# Patient Record
Sex: FEMALE | Race: WHITE | NOT HISPANIC OR LATINO | Employment: FULL TIME | URBAN - METROPOLITAN AREA
[De-identification: names, ages, dates, MRNs, and addresses within clinical notes are randomized per-mention and may not be internally consistent; named-entity substitution may affect disease eponyms.]

---

## 2019-01-06 ENCOUNTER — HOSPITAL ENCOUNTER (OUTPATIENT)
Facility: HOSPITAL | Age: 34
Setting detail: OBSERVATION
Discharge: HOME | End: 2019-01-07
Attending: EMERGENCY MEDICINE | Admitting: INTERNAL MEDICINE

## 2019-01-06 ENCOUNTER — APPOINTMENT (OUTPATIENT)
Dept: GENERAL RADIOLOGY | Facility: HOSPITAL | Age: 34
End: 2019-01-06

## 2019-01-06 DIAGNOSIS — Z78.9 FAILURE OF OUTPATIENT TREATMENT: ICD-10-CM

## 2019-01-06 DIAGNOSIS — R50.9 ACUTE FEBRILE ILLNESS: ICD-10-CM

## 2019-01-06 DIAGNOSIS — J18.9 PNEUMONIA OF RIGHT LOWER LOBE DUE TO INFECTIOUS ORGANISM: Primary | ICD-10-CM

## 2019-01-06 PROBLEM — A41.9 SEPSIS: Status: ACTIVE | Noted: 2019-01-06

## 2019-01-06 LAB
ALBUMIN SERPL-MCNC: 4.09 G/DL (ref 3.2–4.8)
ALBUMIN/GLOB SERPL: 1.7 G/DL (ref 1.5–2.5)
ALP SERPL-CCNC: 42 U/L (ref 25–100)
ALT SERPL W P-5'-P-CCNC: 27 U/L (ref 7–40)
ANION GAP SERPL CALCULATED.3IONS-SCNC: 8 MMOL/L (ref 3–11)
AST SERPL-CCNC: 26 U/L (ref 0–33)
B-HCG UR QL: NEGATIVE
BASOPHILS # BLD AUTO: 0.01 10*3/MM3 (ref 0–0.2)
BASOPHILS NFR BLD AUTO: 0.3 % (ref 0–1)
BILIRUB SERPL-MCNC: 0.4 MG/DL (ref 0.3–1.2)
BUN BLD-MCNC: 6 MG/DL (ref 9–23)
BUN/CREAT SERPL: 8.5 (ref 7–25)
CALCIUM SPEC-SCNC: 8.8 MG/DL (ref 8.7–10.4)
CHLORIDE SERPL-SCNC: 105 MMOL/L (ref 99–109)
CO2 SERPL-SCNC: 25 MMOL/L (ref 20–31)
CREAT BLD-MCNC: 0.71 MG/DL (ref 0.6–1.3)
D-LACTATE SERPL-SCNC: 2 MMOL/L (ref 0.5–2)
DEPRECATED RDW RBC AUTO: 40.5 FL (ref 37–54)
EOSINOPHIL # BLD AUTO: 0.01 10*3/MM3 (ref 0–0.3)
EOSINOPHIL NFR BLD AUTO: 0.3 % (ref 0–3)
ERYTHROCYTE [DISTWIDTH] IN BLOOD BY AUTOMATED COUNT: 12 % (ref 11.3–14.5)
GFR SERPL CREATININE-BSD FRML MDRD: 95 ML/MIN/1.73
GLOBULIN UR ELPH-MCNC: 2.4 GM/DL
GLUCOSE BLD-MCNC: 129 MG/DL (ref 70–100)
HCT VFR BLD AUTO: 40.3 % (ref 34.5–44)
HGB BLD-MCNC: 13.8 G/DL (ref 11.5–15.5)
IMM GRANULOCYTES # BLD AUTO: 0.01 10*3/MM3 (ref 0–0.03)
IMM GRANULOCYTES NFR BLD AUTO: 0.3 % (ref 0–0.6)
INTERNAL NEGATIVE CONTROL: NEGATIVE
INTERNAL POSITIVE CONTROL: POSITIVE
LYMPHOCYTES # BLD AUTO: 0.67 10*3/MM3 (ref 0.6–4.8)
LYMPHOCYTES NFR BLD AUTO: 18 % (ref 24–44)
Lab: NORMAL
MCH RBC QN AUTO: 31.3 PG (ref 27–31)
MCHC RBC AUTO-ENTMCNC: 34.2 G/DL (ref 32–36)
MCV RBC AUTO: 91.4 FL (ref 80–99)
MONOCYTES # BLD AUTO: 0.28 10*3/MM3 (ref 0–1)
MONOCYTES NFR BLD AUTO: 7.5 % (ref 0–12)
NEUTROPHILS # BLD AUTO: 2.76 10*3/MM3 (ref 1.5–8.3)
NEUTROPHILS NFR BLD AUTO: 73.9 % (ref 41–71)
PLATELET # BLD AUTO: 190 10*3/MM3 (ref 150–450)
PMV BLD AUTO: 9.9 FL (ref 6–12)
POTASSIUM BLD-SCNC: 3.9 MMOL/L (ref 3.5–5.5)
PROT SERPL-MCNC: 6.5 G/DL (ref 5.7–8.2)
RBC # BLD AUTO: 4.41 10*6/MM3 (ref 3.89–5.14)
SODIUM BLD-SCNC: 138 MMOL/L (ref 132–146)
WBC NRBC COR # BLD: 3.73 10*3/MM3 (ref 3.5–10.8)

## 2019-01-06 PROCEDURE — 81025 URINE PREGNANCY TEST: CPT | Performed by: EMERGENCY MEDICINE

## 2019-01-06 PROCEDURE — 99284 EMERGENCY DEPT VISIT MOD MDM: CPT

## 2019-01-06 PROCEDURE — 87486 CHLMYD PNEUM DNA AMP PROBE: CPT | Performed by: NURSE PRACTITIONER

## 2019-01-06 PROCEDURE — 87798 DETECT AGENT NOS DNA AMP: CPT | Performed by: NURSE PRACTITIONER

## 2019-01-06 PROCEDURE — 96367 TX/PROPH/DG ADDL SEQ IV INF: CPT

## 2019-01-06 PROCEDURE — 80053 COMPREHEN METABOLIC PANEL: CPT | Performed by: EMERGENCY MEDICINE

## 2019-01-06 PROCEDURE — 25010000002 CEFTRIAXONE PER 250 MG: Performed by: EMERGENCY MEDICINE

## 2019-01-06 PROCEDURE — 85025 COMPLETE CBC W/AUTO DIFF WBC: CPT | Performed by: EMERGENCY MEDICINE

## 2019-01-06 PROCEDURE — G0378 HOSPITAL OBSERVATION PER HR: HCPCS

## 2019-01-06 PROCEDURE — 99219 PR INITIAL OBSERVATION CARE/DAY 50 MINUTES: CPT | Performed by: INTERNAL MEDICINE

## 2019-01-06 PROCEDURE — 96365 THER/PROPH/DIAG IV INF INIT: CPT

## 2019-01-06 PROCEDURE — 87633 RESP VIRUS 12-25 TARGETS: CPT | Performed by: NURSE PRACTITIONER

## 2019-01-06 PROCEDURE — 25010000002 AZITHROMYCIN: Performed by: EMERGENCY MEDICINE

## 2019-01-06 PROCEDURE — 83605 ASSAY OF LACTIC ACID: CPT | Performed by: EMERGENCY MEDICINE

## 2019-01-06 PROCEDURE — 71045 X-RAY EXAM CHEST 1 VIEW: CPT

## 2019-01-06 PROCEDURE — 36415 COLL VENOUS BLD VENIPUNCTURE: CPT | Performed by: EMERGENCY MEDICINE

## 2019-01-06 PROCEDURE — 96361 HYDRATE IV INFUSION ADD-ON: CPT

## 2019-01-06 PROCEDURE — 87040 BLOOD CULTURE FOR BACTERIA: CPT | Performed by: EMERGENCY MEDICINE

## 2019-01-06 PROCEDURE — 87581 M.PNEUMON DNA AMP PROBE: CPT | Performed by: NURSE PRACTITIONER

## 2019-01-06 RX ORDER — SODIUM CHLORIDE 9 MG/ML
100 INJECTION, SOLUTION INTRAVENOUS CONTINUOUS
Status: DISCONTINUED | OUTPATIENT
Start: 2019-01-06 | End: 2019-01-07 | Stop reason: HOSPADM

## 2019-01-06 RX ORDER — SODIUM CHLORIDE 0.9 % (FLUSH) 0.9 %
10 SYRINGE (ML) INJECTION AS NEEDED
Status: DISCONTINUED | OUTPATIENT
Start: 2019-01-06 | End: 2019-01-07 | Stop reason: HOSPADM

## 2019-01-06 RX ORDER — SODIUM CHLORIDE 0.9 % (FLUSH) 0.9 %
3 SYRINGE (ML) INJECTION EVERY 12 HOURS SCHEDULED
Status: DISCONTINUED | OUTPATIENT
Start: 2019-01-06 | End: 2019-01-07 | Stop reason: HOSPADM

## 2019-01-06 RX ORDER — CEFTRIAXONE SODIUM 1 G/50ML
1 INJECTION, SOLUTION INTRAVENOUS EVERY 24 HOURS
Status: DISCONTINUED | OUTPATIENT
Start: 2019-01-07 | End: 2019-01-07 | Stop reason: HOSPADM

## 2019-01-06 RX ORDER — AMOXICILLIN AND CLAVULANATE POTASSIUM 875; 125 MG/1; MG/1
1 TABLET, FILM COATED ORAL 2 TIMES DAILY
COMMUNITY
End: 2019-01-24

## 2019-01-06 RX ORDER — ACETAMINOPHEN 325 MG/1
650 TABLET ORAL EVERY 4 HOURS PRN
Status: DISCONTINUED | OUTPATIENT
Start: 2019-01-06 | End: 2019-01-07 | Stop reason: HOSPADM

## 2019-01-06 RX ORDER — IPRATROPIUM BROMIDE AND ALBUTEROL SULFATE 2.5; .5 MG/3ML; MG/3ML
3 SOLUTION RESPIRATORY (INHALATION) EVERY 4 HOURS PRN
Status: DISCONTINUED | OUTPATIENT
Start: 2019-01-06 | End: 2019-01-07 | Stop reason: HOSPADM

## 2019-01-06 RX ORDER — CETIRIZINE HYDROCHLORIDE 10 MG/1
10 TABLET ORAL DAILY
Status: DISCONTINUED | OUTPATIENT
Start: 2019-01-07 | End: 2019-01-07 | Stop reason: HOSPADM

## 2019-01-06 RX ORDER — CETIRIZINE HYDROCHLORIDE 10 MG/1
10 TABLET ORAL DAILY
COMMUNITY
End: 2021-08-19

## 2019-01-06 RX ORDER — CEFTRIAXONE SODIUM 1 G/50ML
1 INJECTION, SOLUTION INTRAVENOUS ONCE
Status: COMPLETED | OUTPATIENT
Start: 2019-01-06 | End: 2019-01-06

## 2019-01-06 RX ORDER — SODIUM CHLORIDE 0.9 % (FLUSH) 0.9 %
3-10 SYRINGE (ML) INJECTION AS NEEDED
Status: DISCONTINUED | OUTPATIENT
Start: 2019-01-06 | End: 2019-01-07 | Stop reason: HOSPADM

## 2019-01-06 RX ORDER — ACETAMINOPHEN 500 MG
1000 TABLET ORAL ONCE
Status: COMPLETED | OUTPATIENT
Start: 2019-01-06 | End: 2019-01-06

## 2019-01-06 RX ADMIN — SODIUM CHLORIDE 1000 ML: 9 INJECTION, SOLUTION INTRAVENOUS at 19:07

## 2019-01-06 RX ADMIN — AZITHROMYCIN MONOHYDRATE 500 MG: 500 INJECTION, POWDER, LYOPHILIZED, FOR SOLUTION INTRAVENOUS at 21:03

## 2019-01-06 RX ADMIN — CEFTRIAXONE SODIUM 1 G: 1 INJECTION, SOLUTION INTRAVENOUS at 20:26

## 2019-01-06 RX ADMIN — SODIUM CHLORIDE, PRESERVATIVE FREE 3 ML: 5 INJECTION INTRAVENOUS at 22:14

## 2019-01-06 RX ADMIN — ACETAMINOPHEN 1000 MG: 500 TABLET, FILM COATED ORAL at 20:26

## 2019-01-06 RX ADMIN — SODIUM CHLORIDE 100 ML/HR: 9 INJECTION, SOLUTION INTRAVENOUS at 22:14

## 2019-01-06 NOTE — ED PROVIDER NOTES
"Subjective   Natasha Beckett is a 33 y.o.female who presents to the ED with c/o fever with onset a couple of days ago. She reports that she suddenly developed severe fever recording up to 103.1 with cough, chills, diaphoresis, and generalized weakness. She was recently diagnosed with pneumonia by [facility], 2 days ago. She was started on Augmentin 875 mg. She has taken 5 doses of Augmentin prior to her arrival today. At this time, a flu swab was taken which was negative. She has attempted tylenol and motrin with minimal relief. Her last motrin dose was at 1600 today and last tylenol at 1000 today. She also complains of a \"scratchy\" throat but denies any sore throat, dysuria, BM changes, rash, or any other complaints at this time. She has had the flu and hepatitis A vaccine. Her notable temperature readings include 103.1 last night and 102.9 around 1700 today.        History provided by:  Patient  Fever   Max temp prior to arrival:  103.1  Temp source:  Oral  Severity:  Moderate  Onset quality:  Sudden  Timing:  Constant  Progression:  Worsening  Chronicity:  New  Relieved by:  Nothing  Worsened by:  Nothing  Ineffective treatments:  Acetaminophen (motrin)  Associated symptoms: chills and cough    Associated symptoms: no diarrhea, no dysuria, no rash and no sore throat    Risk factors: sick contacts        Review of Systems   Constitutional: Positive for chills, diaphoresis, fatigue and fever.   HENT: Negative for sore throat.    Respiratory: Positive for cough.    Gastrointestinal: Negative for blood in stool, constipation and diarrhea.   Genitourinary: Negative for dysuria.   Skin: Negative for rash.   Neurological: Positive for weakness.   All other systems reviewed and are negative.      History reviewed. No pertinent past medical history.    No Known Allergies    History reviewed. No pertinent surgical history.    History reviewed. No pertinent family history.    Social History     Socioeconomic " History   • Marital status:      Spouse name: Not on file   • Number of children: Not on file   • Years of education: Not on file   • Highest education level: Not on file   Tobacco Use   • Smoking status: Never Smoker   Substance and Sexual Activity   • Alcohol use: No     Frequency: Never   • Drug use: No   • Sexual activity: Yes     Partners: Male         Objective   Physical Exam   Constitutional: She is oriented to person, place, and time. She appears well-developed and well-nourished. No distress.   Appears generally weak. She states that she is feeling poorly.   HENT:   Head: Normocephalic and atraumatic.   Nose: Nose normal.   Mouth/Throat: Mucous membranes are dry.   Eyes: Conjunctivae are normal. No scleral icterus.   Neck: Normal range of motion. Neck supple.   Cardiovascular: Regular rhythm and normal heart sounds. Tachycardia present.   No murmur heard.  Pulmonary/Chest: Effort normal. No respiratory distress. She has decreased breath sounds (Across right base).   Abdominal: Soft. Bowel sounds are normal. There is no tenderness.   Neurological: She is alert and oriented to person, place, and time.   Skin: Skin is warm and dry.   Skin is hot. No rash.    Psychiatric: She has a normal mood and affect. Her behavior is normal.   Nursing note and vitals reviewed.      Procedures         ED Course  ED Course as of Jan 06 2124   Sun Jan 06, 2019 2103 I have paged the hospitalist for admission.  [MS]   2113 Case discussed with Dr. Mg, hospitalist, who will admit to [facility].  [MS]      ED Course User Index  [MS] Clifford Andrew MD     Recent Results (from the past 24 hour(s))   Lactic Acid, Plasma    Collection Time: 01/06/19  6:41 PM   Result Value Ref Range    Lactate 2.0 0.5 - 2.0 mmol/L   Comprehensive Metabolic Panel    Collection Time: 01/06/19  6:41 PM   Result Value Ref Range    Glucose 129 (H) 70 - 100 mg/dL    BUN 6 (L) 9 - 23 mg/dL    Creatinine 0.71 0.60 - 1.30 mg/dL    Sodium 138 132 -  146 mmol/L    Potassium 3.9 3.5 - 5.5 mmol/L    Chloride 105 99 - 109 mmol/L    CO2 25.0 20.0 - 31.0 mmol/L    Calcium 8.8 8.7 - 10.4 mg/dL    Total Protein 6.5 5.7 - 8.2 g/dL    Albumin 4.09 3.20 - 4.80 g/dL    ALT (SGPT) 27 7 - 40 U/L    AST (SGOT) 26 0 - 33 U/L    Alkaline Phosphatase 42 25 - 100 U/L    Total Bilirubin 0.4 0.3 - 1.2 mg/dL    eGFR Non African Amer 95 >60 mL/min/1.73    Globulin 2.4 gm/dL    A/G Ratio 1.7 1.5 - 2.5 g/dL    BUN/Creatinine Ratio 8.5 7.0 - 25.0    Anion Gap 8.0 3.0 - 11.0 mmol/L   CBC Auto Differential    Collection Time: 01/06/19  6:41 PM   Result Value Ref Range    WBC 3.73 3.50 - 10.80 10*3/mm3    RBC 4.41 3.89 - 5.14 10*6/mm3    Hemoglobin 13.8 11.5 - 15.5 g/dL    Hematocrit 40.3 34.5 - 44.0 %    MCV 91.4 80.0 - 99.0 fL    MCH 31.3 (H) 27.0 - 31.0 pg    MCHC 34.2 32.0 - 36.0 g/dL    RDW 12.0 11.3 - 14.5 %    RDW-SD 40.5 37.0 - 54.0 fl    MPV 9.9 6.0 - 12.0 fL    Platelets 190 150 - 450 10*3/mm3    Neutrophil % 73.9 (H) 41.0 - 71.0 %    Lymphocyte % 18.0 (L) 24.0 - 44.0 %    Monocyte % 7.5 0.0 - 12.0 %    Eosinophil % 0.3 0.0 - 3.0 %    Basophil % 0.3 0.0 - 1.0 %    Immature Grans % 0.3 0.0 - 0.6 %    Neutrophils, Absolute 2.76 1.50 - 8.30 10*3/mm3    Lymphocytes, Absolute 0.67 0.60 - 4.80 10*3/mm3    Monocytes, Absolute 0.28 0.00 - 1.00 10*3/mm3    Eosinophils, Absolute 0.01 0.00 - 0.30 10*3/mm3    Basophils, Absolute 0.01 0.00 - 0.20 10*3/mm3    Immature Grans, Absolute 0.01 0.00 - 0.03 10*3/mm3     Note: In addition to lab results from this visit, the labs listed above may include labs taken at another facility or during a different encounter within the last 24 hours. Please correlate lab times with ED admission and discharge times for further clarification of the services performed during this visit.    XR Chest 1 View   Final Result   The overall findings are concerning for pneumonia. A repeat chest radiograph    following treatment may be helpful to document resolution.     "  Additional information as discussed under \"Findings.\"      THIS DOCUMENT HAS BEEN ELECTRONICALLY SIGNED BY CHRISTOPHER BAIRD MD        Vitals:    01/06/19 1827   BP: 126/84   BP Location: Left arm   Patient Position: Sitting   Pulse: (!) 136   Resp: 16   Temp: (!) 102.1 °F (38.9 °C)   TempSrc: Oral   SpO2: 97%   Weight: 65.8 kg (145 lb)   Height: 170.2 cm (67\")     Medications   sodium chloride 0.9 % flush 10 mL (not administered)   azithromycin 500 MG/250 ML 0.9% NS IVPB (MBP) (500 mg Intravenous New Bag 1/6/19 2103)   acetaminophen (TYLENOL) tablet 1,000 mg (1,000 mg Oral Given 1/6/19 2026)   sodium chloride 0.9 % bolus 1,000 mL (0 mL Intravenous Stopped 1/6/19 2026)   cefTRIAXone (ROCEPHIN) IVPB 1 g (0 g Intravenous Stopped 1/6/19 2102)     ECG/EMG Results (last 24 hours)     ** No results found for the last 24 hours. **                        [facility]    Final diagnoses:   Pneumonia of right lower lobe due to infectious organism (CMS/[facility])   Failure of outpatient treatment   Acute febrile illness       Documentation assistance provided by veronique Calles.  Information recorded by the veronique was done at my direction and has been verified and validated by me.     Boris Calles  01/06/19 1940       Clifford Andrew MD  01/06/19 2124    "

## 2019-01-07 VITALS
RESPIRATION RATE: 18 BRPM | SYSTOLIC BLOOD PRESSURE: 101 MMHG | BODY MASS INDEX: 23.29 KG/M2 | WEIGHT: 148.4 LBS | HEIGHT: 67 IN | HEART RATE: 101 BPM | TEMPERATURE: 98.2 F | OXYGEN SATURATION: 97 % | DIASTOLIC BLOOD PRESSURE: 58 MMHG

## 2019-01-07 PROBLEM — J18.9 PNEUMONIA OF RIGHT LOWER LOBE DUE TO INFECTIOUS ORGANISM: Status: ACTIVE | Noted: 2019-01-07

## 2019-01-07 LAB
ANION GAP SERPL CALCULATED.3IONS-SCNC: 3 MMOL/L (ref 3–11)
BASOPHILS # BLD AUTO: 0.01 10*3/MM3 (ref 0–0.2)
BASOPHILS NFR BLD AUTO: 0.2 % (ref 0–1)
BUN BLD-MCNC: 5 MG/DL (ref 9–23)
BUN/CREAT SERPL: 9.6 (ref 7–25)
CALCIUM SPEC-SCNC: 7.9 MG/DL (ref 8.7–10.4)
CHLORIDE SERPL-SCNC: 109 MMOL/L (ref 99–109)
CO2 SERPL-SCNC: 26 MMOL/L (ref 20–31)
CREAT BLD-MCNC: 0.52 MG/DL (ref 0.6–1.3)
DEPRECATED RDW RBC AUTO: 40.7 FL (ref 37–54)
EOSINOPHIL # BLD AUTO: 0 10*3/MM3 (ref 0–0.3)
EOSINOPHIL NFR BLD AUTO: 0 % (ref 0–3)
ERYTHROCYTE [DISTWIDTH] IN BLOOD BY AUTOMATED COUNT: 12.1 % (ref 11.3–14.5)
FLUAV SUBTYP SPEC NAA+PROBE: NOT DETECTED
FLUBV RNA ISLT QL NAA+PROBE: NOT DETECTED
GFR SERPL CREATININE-BSD FRML MDRD: 136 ML/MIN/1.73
GLUCOSE BLD-MCNC: 93 MG/DL (ref 70–100)
HCT VFR BLD AUTO: 34.8 % (ref 34.5–44)
HGB BLD-MCNC: 11.8 G/DL (ref 11.5–15.5)
IMM GRANULOCYTES # BLD AUTO: 0.01 10*3/MM3 (ref 0–0.03)
IMM GRANULOCYTES NFR BLD AUTO: 0.2 % (ref 0–0.6)
LYMPHOCYTES # BLD AUTO: 0.78 10*3/MM3 (ref 0.6–4.8)
LYMPHOCYTES NFR BLD AUTO: 19.4 % (ref 24–44)
MCH RBC QN AUTO: 31 PG (ref 27–31)
MCHC RBC AUTO-ENTMCNC: 33.9 G/DL (ref 32–36)
MCV RBC AUTO: 91.3 FL (ref 80–99)
MONOCYTES # BLD AUTO: 0.36 10*3/MM3 (ref 0–1)
MONOCYTES NFR BLD AUTO: 8.9 % (ref 0–12)
NEUTROPHILS # BLD AUTO: 2.88 10*3/MM3 (ref 1.5–8.3)
NEUTROPHILS NFR BLD AUTO: 71.5 % (ref 41–71)
PLATELET # BLD AUTO: 179 10*3/MM3 (ref 150–450)
PMV BLD AUTO: 10 FL (ref 6–12)
POTASSIUM BLD-SCNC: 3.9 MMOL/L (ref 3.5–5.5)
RBC # BLD AUTO: 3.81 10*6/MM3 (ref 3.89–5.14)
SODIUM BLD-SCNC: 138 MMOL/L (ref 132–146)
WBC NRBC COR # BLD: 4.03 10*3/MM3 (ref 3.5–10.8)

## 2019-01-07 PROCEDURE — G0378 HOSPITAL OBSERVATION PER HR: HCPCS

## 2019-01-07 PROCEDURE — 87502 INFLUENZA DNA AMP PROBE: CPT | Performed by: INTERNAL MEDICINE

## 2019-01-07 PROCEDURE — 80048 BASIC METABOLIC PNL TOTAL CA: CPT | Performed by: NURSE PRACTITIONER

## 2019-01-07 PROCEDURE — 87449 NOS EACH ORGANISM AG IA: CPT | Performed by: NURSE PRACTITIONER

## 2019-01-07 PROCEDURE — 94640 AIRWAY INHALATION TREATMENT: CPT

## 2019-01-07 PROCEDURE — 87899 AGENT NOS ASSAY W/OPTIC: CPT | Performed by: NURSE PRACTITIONER

## 2019-01-07 PROCEDURE — 85025 COMPLETE CBC W/AUTO DIFF WBC: CPT | Performed by: NURSE PRACTITIONER

## 2019-01-07 PROCEDURE — 99217 PR OBSERVATION CARE DISCHARGE MANAGEMENT: CPT | Performed by: INTERNAL MEDICINE

## 2019-01-07 RX ORDER — DOXYCYCLINE 100 MG/1
100 CAPSULE ORAL 2 TIMES DAILY
Qty: 8 CAPSULE | Refills: 0 | Status: SHIPPED | OUTPATIENT
Start: 2019-01-07 | End: 2019-01-11

## 2019-01-07 RX ORDER — OSELTAMIVIR PHOSPHATE 75 MG/1
75 CAPSULE ORAL EVERY 12 HOURS SCHEDULED
Status: DISCONTINUED | OUTPATIENT
Start: 2019-01-07 | End: 2019-01-07

## 2019-01-07 RX ADMIN — ACETAMINOPHEN 650 MG: 325 TABLET ORAL at 09:57

## 2019-01-07 RX ADMIN — IPRATROPIUM BROMIDE AND ALBUTEROL SULFATE 3 ML: 2.5; .5 SOLUTION RESPIRATORY (INHALATION) at 02:20

## 2019-01-07 RX ADMIN — Medication 5 MG: at 03:44

## 2019-01-07 RX ADMIN — SODIUM CHLORIDE, PRESERVATIVE FREE 3 ML: 5 INJECTION INTRAVENOUS at 08:47

## 2019-01-07 RX ADMIN — ACETAMINOPHEN 650 MG: 325 TABLET ORAL at 03:41

## 2019-01-07 NOTE — DISCHARGE SUMMARY
[facility] Medicine Services  DISCHARGE SUMMARY    Patient Name: Natasha Beckett  : 1985  MRN: [id number]    Date of Admission: 2019  Date of Discharge:  2019  Primary Care Physician: Provider, No Known    Consults     No orders found for last 30 day(s).          Hospital Course     Presenting Problem:   Pneumonia of right lower lobe due to infectious organism (CMS/HCC) [J18.1]    Active Hospital Problems    Diagnosis Date Noted   • **Sepsis (CMS/HCC) [A41.9] 2019   • Pneumonia of right lower lobe due to infectious organism (CMS/HCC) [J18.1] 2019   • Pneumonia [J18.9] 2019      Resolved Hospital Problems   No resolved problems to display.          Hospital Course:  Natasha Beckett is a 33 y.o. female with no medical problems who presented to [facility] with concerns of sepsis. Patient was noted to be febrile with leukocytosis on admission, workup demonstrated a PNA in RLL. Patient was initially placed on IV abx with rocephin/azithro to cover for CAP. Patient improved quickly and was feeling feel at time of discharge. A flu swab was sent and pending at time of discharge, this was not sent initially in the ED. Patient was discharged with a 5 day course of doxycycline, instructed to come back to ED if worsening fevers, chills, cough, soa.       Discharge Follow Up Recommendations for labs/diagnostics:  PCP in 1-2 weeks    Day of Discharge     HPI:   Doing better. Feels like breathing is improved. Would like to go home.     Review of Systems      Otherwise ROS is negative except as mentioned in the HPI.    Vital Signs:   Temp:  [98.2 °F (36.8 °C)-102.1 °F (38.9 °C)] 98.2 °F (36.8 °C)  Heart Rate:  [] 101  Resp:  [16-18] 18  BP: (101-126)/(58-84) 101/58     Physical Exam:  GEN- no acute distress noted, resting in bed, awake  HEENT- atraumatic, normocephlic, eomi  NECK- supple, trachea midline, no masses  RESP: good effort, slight wheezing   CV: no murmurs,  s1/s2, rrr  MSK: no edema noted, spontaneous movement of all extremities  NEURO: alert, oriented, no focal deficits  SKIN: no rashes  PSYCH: appropriate mood and affect       Pertinent  and/or Most Recent Results     Results from last 7 days   Lab Units  01/07/19   0549  01/06/19   1841   WBC 10*3/mm3  4.03  3.73   HEMOGLOBIN g/dL  11.8  13.8   HEMATOCRIT %  34.8  40.3   PLATELETS 10*3/mm3  179  190   SODIUM mmol/L  138  138   POTASSIUM mmol/L  3.9  3.9   CHLORIDE mmol/L  109  105   CO2 mmol/L  26.0  25.0   BUN mg/dL  5*  6*   CREATININE mg/dL  0.52*  0.71   GLUCOSE mg/dL  93  129*   CALCIUM mg/dL  7.9*  8.8     Results from last 7 days   Lab Units  01/06/19   1841   BILIRUBIN mg/dL  0.4   ALK PHOS U/L  42   ALT (SGPT) U/L  27   AST (SGOT) U/L  26           Invalid input(s): TG, LDLCALC, LDLREALC        Brief Urine Lab Results  (Last result in the past 365 days)      Color   Clarity   Blood   Leuk Est   Nitrite   Protein   CREAT   Urine HCG        01/06/19 2152               Negative           Microbiology Results Abnormal     Procedure Component Value - Date/Time    Blood Culture - Blood, Wrist, Right [[id number]] Collected:  01/06/19 1840    Lab Status:  Preliminary result Specimen:  Blood from Wrist, Right Updated:  01/07/19 0745     Blood Culture No growth at less than 24 hours    Blood Culture - Blood, Arm, Left [[id number]] Collected:  01/06/19 1850    Lab Status:  Preliminary result Specimen:  Blood from Arm, Left Updated:  01/07/19 0745     Blood Culture No growth at less than 24 hours          Imaging Results (all)     Procedure Component Value Units Date/Time    XR Chest 1 View [[id number]] Collected:  01/06/19 1847     Updated:  01/06/19 2003    Narrative:       EXAM:    XR Chest, 1 View     EXAM DATE/TIME:    1/6/2019 6:47 PM     CLINICAL HISTORY:    33 years old, female; Signs and symptoms; Fever; Additional info: Simple   sepsis triage protocol     TECHNIQUE:    XR of the chest, frontal view.     COMPARISON:  "   No relevant prior studies available.     FINDINGS:    Lungs:  Localized air space disease in the right lower lung zone concerning   for pneumonia.    Pleural space: No evidence of a pneumothorax. No definite pleural effusion.    Heart/Mediastinum: No cardiomegaly. No significant mediastinal widening.    Bones/joints: No evidence of an acute fracture.       Impression:       The overall findings are concerning for pneumonia. A repeat chest radiograph   following treatment may be helpful to document resolution.    Additional information as discussed under \"Findings.\"    THIS DOCUMENT HAS BEEN ELECTRONICALLY SIGNED BY CHRISTOPHER BAIRD MD                          Order Current Status    Influenza A & B, RT PCR - Swab, Nasopharynx In process    Legionella Antigen, Urine - Urine, Urine, Clean Catch In process    Respiratory Virus Antigens Panel - Swab, Nasopharynx In process    S. Pneumo Ag Urine or CSF - Urine, Urine, Clean Catch In process    Blood Culture - Blood, Arm, Left Preliminary result    Blood Culture - Blood, Wrist, Right Preliminary result        Discharge Details        Discharge Medications      New Medications      Instructions Start Date   doxycycline 100 MG capsule  Commonly known as:  MONODOX   100 mg, Oral, 2 Times Daily         Continue These Medications      Instructions Start Date   amoxicillin-clavulanate 875-125 MG per tablet  Commonly known as:  AUGMENTIN   1 tablet, Oral, 2 Times Daily      cetirizine 10 MG tablet  Commonly known as:  zyrTEC   10 mg, Oral, Daily      LOMEDIA 24 FE PO   Oral, Daily             No Known Allergies      Discharge Disposition:  Home or Self Care    Discharge Diet:  Diet Order   Procedures   • Diet Regular         Discharge Activity:       Special Instructions:  If worsening with fever, chills, soa please return to ED for evaluation    CODE STATUS:    Code Status and Medical Interventions:   Ordered at: 01/06/19 0810     Level Of Support Discussed With:    " Patient     Code Status:    CPR     Medical Interventions (Level of Support Prior to Arrest):    Full         No future appointments.        Time Spent on Discharge:  35 minutes    Electronically signed by Arianna Hamilton MD, 01/07/19, 11:33 AM.

## 2019-01-07 NOTE — H&P
"    [facility] Medicine Services  HISTORY AND PHYSICAL    Patient Name: Natasha Beckett  : 1985  MRN: [id number]  Primary Care Physician: Provider, No Known  Date of admission: 2019      Subjective   Subjective     Chief Complaint:  fever    HPI:  Natasha Beckett is a 33 y.o. female who presents to the ED with complaints of fever that started on 19. She reports that she suddenly developed severe fever recording up to 103.1 with a nonproductive cough, chills, diaphoresis, and generalized weakness. She was diagnosed two days ago with pneumonia by [facility] and was started on Augmentin 875 mg. She has taken 5 doses of Augmentin prior to her arrival today. A flu swab was taken at the clinic and was negative. She has attempted tylenol and motrin with minimal relief. She also complains of a \"scratchy\" throat but denies any sore throat, dysuria, BM changes, rash, or any other complaints at this time. She has had the flu and hepatitis A vaccine.  Chest xray findings are concerning for pneumonia.  Patient was started on Rocephin and Azithromycin in the ED, and is being admitted to the Hospitalist for further evaluation and management.      Review of Systems   Constitutional: Positive for activity change, appetite change, chills, diaphoresis, fatigue and fever. Negative for unexpected weight change.   HENT: Negative.    Eyes: Negative.    Respiratory: Positive for cough (nonproductive) and shortness of breath. Negative for wheezing.    Cardiovascular: Negative.    Gastrointestinal: Negative.    Endocrine: Negative.    Genitourinary: Negative.    Musculoskeletal: Negative.    Skin: Negative.    Allergic/Immunologic: Negative.    Neurological: Negative.    Hematological: Negative.    Psychiatric/Behavioral: Negative.         Otherwise 10-system ROS reviewed and is negative except as mentioned in the HPI.    Personal History     History reviewed. No pertinent past medical " history.    History reviewed. No pertinent surgical history.    Family History: family history is not on file. Otherwise pertinent FHx was reviewed and unremarkable.     Social History:  reports that  has never smoked. She does not have any smokeless tobacco history on file. She reports that she does not drink alcohol or use drugs.  Social History     Social History Narrative   • Not on file       Medications:    Available home medication information reviewed.    (Not in a hospital admission)    No Known Allergies    Objective   Objective     Vital Signs:   Temp:  [102.1 °F (38.9 °C)] 102.1 °F (38.9 °C)  Heart Rate:  [114-136] 114  Resp:  [16] 16  BP: (112-126)/(76-84) 112/76        Physical Exam   Constitutional: She is oriented to person, place, and time. She appears well-developed. No distress.   HENT:   Head: Normocephalic.   Eyes: Pupils are equal, round, and reactive to light.   Neck: Normal range of motion. Neck supple.   Cardiovascular: Regular rhythm, normal heart sounds and intact distal pulses. Exam reveals no gallop and no friction rub.   No murmur heard.  tachycardia   Pulmonary/Chest: Effort normal and breath sounds normal. No stridor. No respiratory distress. She has no wheezes. She has no rales. She exhibits no tenderness.   Abdominal: Soft. Bowel sounds are normal. She exhibits no distension and no mass. There is no tenderness. There is no rebound and no guarding.   Musculoskeletal: Normal range of motion. She exhibits no edema, tenderness or deformity.   Neurological: She is alert and oriented to person, place, and time. No cranial nerve deficit.   Skin: Skin is warm and dry. No rash noted. She is not diaphoretic. No erythema. No pallor.   Psychiatric: She has a normal mood and affect. Her behavior is normal. Judgment and thought content normal.        Results Reviewed:  I have personally reviewed current lab, radiology, and data and agree.    Results from last 7 days   Lab Units  01/06/19   0366  "  WBC 10*3/mm3  3.73   HEMOGLOBIN g/dL  13.8   HEMATOCRIT %  40.3   PLATELETS 10*3/mm3  190     Results from last 7 days   Lab Units  01/06/19   1841   SODIUM mmol/L  138   POTASSIUM mmol/L  3.9   CHLORIDE mmol/L  105   CO2 mmol/L  25.0   BUN mg/dL  6*   CREATININE mg/dL  0.71   GLUCOSE mg/dL  129*   CALCIUM mg/dL  8.8   ALT (SGPT) U/L  27   AST (SGOT) U/L  26     Estimated Creatinine Clearance: 117.1 mL/min (by C-G formula based on SCr of 0.71 mg/dL).  Brief Urine Lab Results  (Last result in the past 365 days)      Color   Clarity   Blood   Leuk Est   Nitrite   Protein   CREAT   Urine HCG        01/06/19 2152               Negative         No results found for: BNP  Imaging Results (last 24 hours)     Procedure Component Value Units Date/Time    XR Chest 1 View [[id number]] Collected:  01/06/19 1847     Updated:  01/06/19 2003    Narrative:       EXAM:    XR Chest, 1 View     EXAM DATE/TIME:    1/6/2019 6:47 PM     CLINICAL HISTORY:    33 years old, female; Signs and symptoms; Fever; Additional info: Simple   sepsis triage protocol     TECHNIQUE:    XR of the chest, frontal view.     COMPARISON:    No relevant prior studies available.     FINDINGS:    Lungs:  Localized air space disease in the right lower lung zone concerning   for pneumonia.    Pleural space: No evidence of a pneumothorax. No definite pleural effusion.    Heart/Mediastinum: No cardiomegaly. No significant mediastinal widening.    Bones/joints: No evidence of an acute fracture.       Impression:       The overall findings are concerning for pneumonia. A repeat chest radiograph   following treatment may be helpful to document resolution.    Additional information as discussed under \"Findings.\"    THIS DOCUMENT HAS BEEN ELECTRONICALLY SIGNED BY CHRISTOPHER BAIRD MD             Assessment/Plan   Assessment / Plan     Active Hospital Problems    Diagnosis Date Noted   • **Sepsis (CMS/[facility]) [A41.9] 01/06/2019   • Pneumonia [J18.9] 01/06/2019         1.  " Sepsis   -BC x 2   -Rocephin and Azithromycin   -IV fluids   -cbc, bmp in the am    2.  Pneumonia   -Rocephin and Azithromycin   -BC x 2   -sputum culture   -incentive spirometry   -duoneb   -urine legionella and S. Pneumo Ag   -cbc, bmp in the am    DVT prophylaxis:  Lovenox    CODE STATUS:    Code Status and Medical Interventions:   Ordered at: 01/06/19 2204     Level Of Support Discussed With:    Patient     Code Status:    CPR     Medical Interventions (Level of Support Prior to Arrest):    Full           Electronically signed by AUDRA Banda, 01/06/19, 9:41 PM.    PHYSICIAN NOTE(ADDENDUM)       Vitals:    01/07/19 0220   BP: 100.1   Pulse: 110   Resp: 18   Temp: 120/80   SpO2: 99% on ra       Exam  RS- CTA-BL, ,  No wheezing , no crackles, good effort.  CVS- s1s2 regular, no murmur.  ABD- soft, non tender, not distended, no organomegaly.  EXT- no edema.  NEURO- AAO-3, no focal defecit.      Old records reviewed and summarized in PM hx.    HPI.  33 Y F, to ed, with co of fever, nonprod-cough, chills-stared on PO augmentin recently-but still no improvement in sx.  cxr- in ed ?of R.base pnm-adm for sepsis.      A/P.  -rocephin, Zithromax, iv fluids  -likely dc if better.          See above for further detailed assessment and plan developed with APC which I have reviewed and/or edited.     I have discussed with findings, diagnosis and plans with the patient and family.     Graciela Mg MD 01/07/19 3:09 AM

## 2019-01-07 NOTE — PROGRESS NOTES
Discharge Planning Assessment  [facility]     Patient Name: Natasha Beckett  MRN: [id number]  Today's Date: 1/7/2019    Admit Date: 1/6/2019    Discharge Needs Assessment     Row Name 01/07/19 1028       Living Environment    Lives With  spouse;child(julia), dependent    Name(s) of Who Lives With Patient  spouse is Gianluca beckett    Current Living Arrangements  home/apartment/condo    Primary Care Provided by  self    Provides Primary Care For  child(julia)    Family Caregiver if Needed  spouse    Quality of Family Relationships  unable to assess    Able to Return to Prior Arrangements  yes       Resource/Environmental Concerns    Resource/Environmental Concerns  none    Transportation Concerns  car, none       Transition Planning    Patient/Family Anticipates Transition to  home with family    Patient/Family Anticipated Services at Transition  none    Transportation Anticipated  family or friend will provide       Discharge Needs Assessment    Readmission Within the Last 30 Days  no previous admission in last 30 days    Concerns to be Addressed  no discharge needs identified    Equipment Currently Used at Home  none    Anticipated Changes Related to Illness  none    Equipment Needed After Discharge  none        Discharge Plan     Row Name 01/07/19 1030       Plan    Plan  Home with family    Patient/Family in Agreement with Plan  yes    Plan Comments  I spoke with patient. No discharge planning needs identified.     Final Discharge Disposition Code  01 - home or self-care        Destination      No service coordination in this encounter.      Durable Medical Equipment      No service coordination in this encounter.      Dialysis/Infusion      No service coordination in this encounter.      Home Medical Care      No service coordination in this encounter.      Community Resources      No service coordination in this encounter.          Demographic Summary     Row Name 01/07/19 1026       General Information     Referral Source  admission list    Preferred Language  English       Contact Information    Permission Granted to Share Info With      Contact Information Obtained for      Contact Information Comments  [phone]        Functional Status     Row Name 01/07/19 1027       Functional Status    Usual Activity Tolerance  excellent    Current Activity Tolerance  poor       Functional Status, IADL    Medications  independent    Meal Preparation  independent    Housekeeping  independent    Laundry  independent    Shopping  independent       Employment/    Employment/ Comments  Has  insurance, she tells me no concerns with coverage or obtaining medications. Patient is a pharmacist        Psychosocial    No documentation.       Abuse/Neglect    No documentation.       Legal    No documentation.       Substance Abuse    No documentation.       Patient Forms    No documentation.           Arianna Griffith RN

## 2019-01-07 NOTE — PLAN OF CARE
Problem: Patient Care Overview  Goal: Plan of Care Review  Outcome: Ongoing (interventions implemented as appropriate)   01/07/19 5737   OTHER   Outcome Summary Patient to be discharged, all instructions reviewed.    Coping/Psychosocial   Plan of Care Reviewed With patient   Plan of Care Review   Progress improving     Goal: Individualization and Mutuality  Outcome: Ongoing (interventions implemented as appropriate)    Goal: Discharge Needs Assessment  Outcome: Ongoing (interventions implemented as appropriate)    Goal: Interprofessional Rounds/Family Conf  Outcome: Ongoing (interventions implemented as appropriate)      Problem: Pneumonia (Adult)  Goal: Signs and Symptoms of Listed Potential Problems Will be Absent, Minimized or Managed (Pneumonia)  Outcome: Ongoing (interventions implemented as appropriate)

## 2019-01-07 NOTE — PLAN OF CARE
Problem: Patient Care Overview  Goal: Plan of Care Review  Outcome: Ongoing (interventions implemented as appropriate)   01/06/19 2200 01/06/19 9442   OTHER   Outcome Summary --  vss, temp WNL, naso swab collected and sent   Coping/Psychosocial   Plan of Care Reviewed With patient --      Goal: Individualization and Mutuality  Outcome: Ongoing (interventions implemented as appropriate)    Goal: Discharge Needs Assessment  Outcome: Ongoing (interventions implemented as appropriate)    Goal: Interprofessional Rounds/Family Conf  Outcome: Ongoing (interventions implemented as appropriate)      Problem: Pneumonia (Adult)  Goal: Signs and Symptoms of Listed Potential Problems Will be Absent, Minimized or Managed (Pneumonia)  Outcome: Ongoing (interventions implemented as appropriate)

## 2019-01-08 ENCOUNTER — TRANSITIONAL CARE MANAGEMENT TELEPHONE ENCOUNTER (OUTPATIENT)
Dept: FAMILY MEDICINE CLINIC | Facility: CLINIC | Age: 34
End: 2019-01-08

## 2019-01-09 LAB
BACTERIA FLD CULT: NORMAL
L PNEUMO1 AG UR QL IA: NEGATIVE
Lab: NORMAL
ORGANISM ID: NORMAL
S PNEUM AG SPEC QL LA: NEGATIVE
SPECIMEN SOURCE: NORMAL

## 2019-01-10 LAB
B PERT.PT PRMT NPH QL NAA+NON-PROBE: NOT DETECTED
C PNEUM DNA NPH QL NAA+NON-PROBE: NOT DETECTED
FLUAV H1 RNA NPH QL NAA+NON-PROBE: NOT DETECTED
FLUAV H3 RNA NPH QL NAA+NON-PROBE: NOT DETECTED
FLUAV RNA SPEC QL NAA+PROBE: NOT DETECTED
FLUBV RNA SPEC QL NAA+PROBE: NOT DETECTED
HADV DNA SPEC QL NAA+PROBE: NOT DETECTED
HCOV 229E RNA NPH QL NAA+NON-PROBE: NOT DETECTED
HCOV HKU1 RNA NPH QL NAA+NON-PROBE: NOT DETECTED
HCOV NL63 RNA NPH QL NAA+NON-PROBE: NOT DETECTED
HCOV OC43 RNA NPH QL NAA+NON-PROBE: NOT DETECTED
HMPV RNA SPEC QL NAA+PROBE: NOT DETECTED
HPIV1 RNA SPEC QL NAA+PROBE: NOT DETECTED
HPIV2 SPEC QL CULT: NOT DETECTED
HPIV3 SPEC QL CULT: NOT DETECTED
HPIV4 RNA NPH QL NAA+NON-PROBE: NOT DETECTED
INR PPP: NOT DETECTED
M PNEUMO DNA SPEC QL NAA+PROBE: NOT DETECTED
RHINOVIRUS RNA SPEC QL NAA+PROBE: NOT DETECTED
RSV AG SPEC QL: NOT DETECTED

## 2019-01-11 LAB
BACTERIA SPEC AEROBE CULT: NORMAL
BACTERIA SPEC AEROBE CULT: NORMAL

## 2019-01-24 ENCOUNTER — OFFICE VISIT (OUTPATIENT)
Dept: INTERNAL MEDICINE | Facility: CLINIC | Age: 34
End: 2019-01-24

## 2019-01-24 VITALS
OXYGEN SATURATION: 99 % | WEIGHT: 147 LBS | RESPIRATION RATE: 18 BRPM | DIASTOLIC BLOOD PRESSURE: 80 MMHG | HEART RATE: 78 BPM | SYSTOLIC BLOOD PRESSURE: 116 MMHG | HEIGHT: 67 IN | BODY MASS INDEX: 23.07 KG/M2

## 2019-01-24 DIAGNOSIS — J18.9 PNEUMONIA OF RIGHT LOWER LOBE DUE TO INFECTIOUS ORGANISM: Primary | ICD-10-CM

## 2019-01-24 DIAGNOSIS — Z13.21 ENCOUNTER FOR VITAMIN DEFICIENCY SCREENING: ICD-10-CM

## 2019-01-24 DIAGNOSIS — Z13.29 THYROID DISORDER SCREENING: ICD-10-CM

## 2019-01-24 DIAGNOSIS — Z13.1 SCREENING FOR DIABETES MELLITUS: ICD-10-CM

## 2019-01-24 DIAGNOSIS — Z13.220 SCREENING FOR LIPID DISORDERS: ICD-10-CM

## 2019-01-24 DIAGNOSIS — J32.0 MAXILLARY SINUSITIS, UNSPECIFIED CHRONICITY: ICD-10-CM

## 2019-01-24 LAB
25(OH)D3 SERPL-MCNC: 70.4 NG/ML
ANION GAP SERPL CALCULATED.3IONS-SCNC: 4 MMOL/L (ref 3–11)
ARTICHOKE IGE QN: 94 MG/DL (ref 0–130)
BASOPHILS # BLD AUTO: 0.01 10*3/MM3 (ref 0–0.2)
BASOPHILS NFR BLD AUTO: 0.2 % (ref 0–1)
BUN BLD-MCNC: 12 MG/DL (ref 9–23)
BUN/CREAT SERPL: 18.8 (ref 7–25)
CALCIUM SPEC-SCNC: 9.1 MG/DL (ref 8.7–10.4)
CHLORIDE SERPL-SCNC: 108 MMOL/L (ref 99–109)
CHOLEST SERPL-MCNC: 153 MG/DL (ref 0–200)
CO2 SERPL-SCNC: 27 MMOL/L (ref 20–31)
CREAT BLD-MCNC: 0.64 MG/DL (ref 0.6–1.3)
DEPRECATED RDW RBC AUTO: 41.7 FL (ref 37–54)
EOSINOPHIL # BLD AUTO: 0.04 10*3/MM3 (ref 0–0.3)
EOSINOPHIL NFR BLD AUTO: 0.9 % (ref 0–3)
ERYTHROCYTE [DISTWIDTH] IN BLOOD BY AUTOMATED COUNT: 12.6 % (ref 11.3–14.5)
GFR SERPL CREATININE-BSD FRML MDRD: 106 ML/MIN/1.73
GLUCOSE BLD-MCNC: 85 MG/DL (ref 70–100)
HBA1C MFR BLD: 4.6 % (ref 4.8–5.6)
HCT VFR BLD AUTO: 36.6 % (ref 34.5–44)
HDLC SERPL-MCNC: 57 MG/DL (ref 40–60)
HGB BLD-MCNC: 12.6 G/DL (ref 11.5–15.5)
IMM GRANULOCYTES # BLD AUTO: 0 10*3/MM3 (ref 0–0.03)
IMM GRANULOCYTES NFR BLD AUTO: 0 % (ref 0–0.6)
LYMPHOCYTES # BLD AUTO: 1.66 10*3/MM3 (ref 0.6–4.8)
LYMPHOCYTES NFR BLD AUTO: 35.7 % (ref 24–44)
MCH RBC QN AUTO: 31.5 PG (ref 27–31)
MCHC RBC AUTO-ENTMCNC: 34.4 G/DL (ref 32–36)
MCV RBC AUTO: 91.5 FL (ref 80–99)
MONOCYTES # BLD AUTO: 0.36 10*3/MM3 (ref 0–1)
MONOCYTES NFR BLD AUTO: 7.7 % (ref 0–12)
NEUTROPHILS # BLD AUTO: 2.58 10*3/MM3 (ref 1.5–8.3)
NEUTROPHILS NFR BLD AUTO: 55.5 % (ref 41–71)
PLATELET # BLD AUTO: 261 10*3/MM3 (ref 150–450)
PMV BLD AUTO: 10.8 FL (ref 6–12)
POTASSIUM BLD-SCNC: 4.5 MMOL/L (ref 3.5–5.5)
RBC # BLD AUTO: 4 10*6/MM3 (ref 3.89–5.14)
SODIUM BLD-SCNC: 139 MMOL/L (ref 132–146)
TRIGL SERPL-MCNC: 62 MG/DL (ref 0–150)
TSH SERPL DL<=0.05 MIU/L-ACNC: 1.07 MIU/ML (ref 0.35–5.35)
VIT B12 BLD-MCNC: 672 PG/ML (ref 211–911)
WBC NRBC COR # BLD: 4.65 10*3/MM3 (ref 3.5–10.8)

## 2019-01-24 PROCEDURE — 99204 OFFICE O/P NEW MOD 45 MIN: CPT | Performed by: NURSE PRACTITIONER

## 2019-01-24 PROCEDURE — 80048 BASIC METABOLIC PNL TOTAL CA: CPT | Performed by: NURSE PRACTITIONER

## 2019-01-24 PROCEDURE — 82306 VITAMIN D 25 HYDROXY: CPT | Performed by: NURSE PRACTITIONER

## 2019-01-24 PROCEDURE — 83036 HEMOGLOBIN GLYCOSYLATED A1C: CPT | Performed by: NURSE PRACTITIONER

## 2019-01-24 PROCEDURE — 85025 COMPLETE CBC W/AUTO DIFF WBC: CPT | Performed by: NURSE PRACTITIONER

## 2019-01-24 PROCEDURE — 82607 VITAMIN B-12: CPT | Performed by: NURSE PRACTITIONER

## 2019-01-24 PROCEDURE — 80061 LIPID PANEL: CPT | Performed by: NURSE PRACTITIONER

## 2019-01-24 PROCEDURE — 84443 ASSAY THYROID STIM HORMONE: CPT | Performed by: NURSE PRACTITIONER

## 2019-01-24 RX ORDER — NORETHINDRONE ACETATE AND ETHINYL ESTRADIOL AND FERROUS FUMARATE 1MG-20(24)
KIT ORAL
COMMUNITY
End: 2020-12-16 | Stop reason: SDUPTHER

## 2019-01-24 NOTE — PROGRESS NOTES
Subjective   Natasha Beckett is a 34 y.o. female here today for hospital FU on PNA.    Chief Complaint   Patient presents with   • Pneumonia     Natasha is here today to establish care and for a hospital follow-up for right lower lobe pneumonia. She was on Augmentin for 3 days prior to hospitalization but failed outpatient therapy and was admitted to [facility] for 1 day for pneumonia treatment. She was then discharged on doxycycline.  She has completed her antibiotic and is feeling mostly better.  She does not have any more shortness of breath or cough.  She does still have some nasal congestion which is occasionally blood tinged, as well as some right maxillary tenderness.  She denies fever, chills, nausea, vomiting, headaches.      Review of Systems   Constitutional: Negative for chills, fever, unexpected weight gain and unexpected weight loss.   HENT: Positive for congestion and sinus pressure. Negative for ear discharge, ear pain, facial swelling, postnasal drip and sore throat.    Respiratory: Negative for cough, chest tightness, shortness of breath and wheezing.    Cardiovascular: Negative for chest pain and palpitations.   Gastrointestinal: Negative for blood in stool, diarrhea, nausea and vomiting.   Endocrine: Negative for polydipsia, polyphagia and polyuria.   Genitourinary: Negative.    Musculoskeletal: Negative for arthralgias and myalgias.   Skin: Negative for rash and skin lesions.   Allergic/Immunologic: Positive for environmental allergies.   Neurological: Negative for dizziness, seizures, weakness, headache, memory problem and confusion.   Psychiatric/Behavioral: Negative for self-injury, sleep disturbance, suicidal ideas and depressed mood. The patient is nervous/anxious.        Past Medical History:   Diagnosis Date   • Acid reflux    • Pneumonia 1/6/2019     Family History   Problem Relation Age of Onset   • Hypertension Father    • COPD Maternal Grandmother      History  "reviewed. No pertinent surgical history.  Social History     Socioeconomic History   • Marital status:      Spouse name: Not on file   • Number of children: Not on file   • Years of education: Not on file   • Highest education level: Not on file   Social Needs   • Financial resource strain: Not on file   • Food insecurity - worry: Not on file   • Food insecurity - inability: Not on file   • Transportation needs - medical: Not on file   • Transportation needs - non-medical: Not on file   Occupational History   • Not on file   Tobacco Use   • Smoking status: Never Smoker   • Smokeless tobacco: Never Used   Substance and Sexual Activity   • Alcohol use: Yes     Frequency: Never     Comment: 1-2   • Drug use: No   • Sexual activity: Yes     Partners: Male   Other Topics Concern   • Not on file   Social History Narrative   • Not on file         Current Outpatient Medications:   •  cetirizine (zyrTEC) 10 MG tablet, Take 10 mg by mouth Daily., Disp: , Rfl:   •  norethindrone-ethinyl estradiol-ferrous fumarate (LOMEDIA 24 FE) 1-20 MG-MCG(24) per tablet, Lomedia 24 Fe, Disp: , Rfl:     Objective   Vitals:    01/24/19 0850   BP: 116/80   Pulse: 78   Resp: 18   SpO2: 99%   Weight: 66.7 kg (147 lb)   Height: 170.2 cm (67\")     Body mass index is 23.02 kg/m².    Physical Exam   Constitutional: She is oriented to person, place, and time. She appears well-developed and well-nourished. No distress.   HENT:   Head: Normocephalic and atraumatic.   Right Ear: External ear normal.   Left Ear: External ear normal.   Nose: Nose normal.   Mouth/Throat: Oropharynx is clear and moist. No oropharyngeal exudate.   Eyes: Pupils are equal, round, and reactive to light. Right eye exhibits no discharge. Left eye exhibits no discharge. No scleral icterus.   Neck: Normal range of motion. Neck supple. No thyromegaly present.   Cardiovascular: Normal rate and regular rhythm.   Pulmonary/Chest: Effort normal and breath sounds normal. No " stridor. No respiratory distress. She has no wheezes. She has no rales.   Abdominal: Soft. Bowel sounds are normal. She exhibits no distension. There is no hepatosplenomegaly. There is no tenderness. There is no rigidity, no rebound, no guarding, no CVA tenderness, no tenderness at McBurney's point and negative Navas's sign.   Musculoskeletal: She exhibits no edema or deformity.   Neurological: She is alert and oriented to person, place, and time. She has normal strength.   Skin: Skin is warm and dry. Capillary refill takes 2 to 3 seconds. She is not diaphoretic. No pallor.   Psychiatric: She has a normal mood and affect. Her behavior is normal. Judgment and thought content normal. Cognition and memory are normal.   Nursing note and vitals reviewed.      Assessment/Plan   Problem List Items Addressed This Visit        Respiratory    RESOLVED: Pneumonia of right lower lobe due to infectious organism (CMS/HCC) - Primary    Relevant Medications    cetirizine (zyrTEC) 10 MG tablet    Other Relevant Orders    Basic Metabolic Panel    CBC & Differential    CBC Auto Differential      Other Visit Diagnoses     Screening for lipid disorders        Relevant Orders    Lipid Panel    Thyroid disorder screening        Relevant Orders    TSH    Screening for diabetes mellitus        Relevant Orders    Hemoglobin A1c    Encounter for vitamin deficiency screening        Relevant Orders    Vitamin D 25 Hydroxy    Vitamin B12    Maxillary sinusitis, unspecified chronicity              Natasha was seen today for pneumonia.    Diagnoses and all orders for this visit:    Pneumonia of right lower lobe due to infectious organism (CMS/HCC)  -     Basic Metabolic Panel  -     CBC & Differential  -     CBC Auto Differential  -    resolved  Maxillary sinusitis, unspecified chronicity        -   Recommend conservative measures such as flonase and nasal saline.  Recommend cool mist humidifier.  RTC if no improvement or new, worsening  s/s  Screening for lipid disorders  -     Lipid Panel    Thyroid disorder screening  -     TSH    Screening for diabetes mellitus  -     Hemoglobin A1c    Encounter for vitamin deficiency screening  -     Vitamin D 25 Hydroxy  -     Vitamin B12             Plan of care reviewed with the patient at the conclusion of today's visit.  Education was provided regarding diagnosis, management, and any prescribed or recommended OTC medications.  Patient verbalized understanding of and agreement with management plan.     Return if symptoms worsen or fail to improve.      Marisa Walker, APRN

## 2020-09-10 ENCOUNTER — OFFICE VISIT (OUTPATIENT)
Dept: OBSTETRICS AND GYNECOLOGY | Facility: CLINIC | Age: 35
End: 2020-09-10

## 2020-09-10 VITALS
SYSTOLIC BLOOD PRESSURE: 132 MMHG | HEIGHT: 67 IN | DIASTOLIC BLOOD PRESSURE: 70 MMHG | WEIGHT: 146 LBS | BODY MASS INDEX: 22.91 KG/M2

## 2020-09-10 DIAGNOSIS — Z01.419 ENCOUNTER FOR ANNUAL ROUTINE GYNECOLOGICAL EXAMINATION: Primary | ICD-10-CM

## 2020-09-10 PROCEDURE — 99385 PREV VISIT NEW AGE 18-39: CPT | Performed by: OBSTETRICS & GYNECOLOGY

## 2020-09-10 NOTE — PROGRESS NOTES
GYN Annual Exam     CC - Here for annual exam.     Subjective   HPI  Natasha Beckett is a 35 y.o. female, , who presents for annual well woman exam. Patient's last menstrual period was 2020 (exact date)..  Periods are regular every 25-35 days, lasting 7 days.  Dysmenorrhea:mild, occurring first 1-2 days of flow.  Patient reports problems with: none.  Partner Status: Marital Status: .  New Partners since last visit: no.  Desires STD Screening: no.        Her son is now 4yo, autistic- is in a functional medicine program through [facility] and is becoming verbal, doing well. . 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     Additional OB/GYN History   Current contraception: contraceptive methods: None  Desires to: discuss other options for contraception. Patient stopped taking BCPs d/t feelings of fluid retention and bloating while on them.  Last Pap :   Last Completed Pap Smear       Status Date      PAP SMEAR Done 8/15/2019 negative     Patient has more history with this topic...        History of abnormal Pap smear: no  Family history of uterine, colon, breast, or ovarian cancer: yes - maternal grandmother  Performs monthly Self-Breast Exam: yes  Exercises Regularly:yes  Feelings of Anxiety or Depression: no  Tobacco Usage?: No   OB History        1    Para   1    Term   1            AB        Living   1       SAB        TAB        Ectopic        Molar        Multiple        Live Births   1                Health Maintenance   Topic Date Due   • Annual Gynecologic Pelvic and Breast Exam  1985   • PNEUMOCOCCAL VACCINE (19-64 MEDIUM RISK) (1 of 1 - PPSV23) 2004   • ANNUAL PHYSICAL  2018   • HEPATITIS C SCREENING  2019   • INFLUENZA VACCINE  2020   • PAP SMEAR  08/15/2022   • TDAP/TD VACCINES (2 - Td) 2027       The additional following portions of the patient's history were reviewed and updated as appropriate: allergies, current medications, past  "family history, past medical history, past social history, past surgical history and problem list.    Review of Systems   All other systems reviewed and are negative.      I have reviewed and agree with the HPI, ROS, and historical information as entered above. Ani Rosa MD    Objective   /70 (BP Location: Right arm, Patient Position: Sitting, Cuff Size: Adult)   Ht 170.2 cm (67\")   Wt 66.2 kg (146 lb)   LMP 08/24/2020 (Exact Date)   Breastfeeding No   BMI 22.87 kg/m²     Physical Exam   Constitutional: She is oriented to person, place, and time. She appears well-developed and well-nourished.   HENT:   Head: Normocephalic and atraumatic.   Eyes: Pupils are equal, round, and reactive to light.   Neck: Normal range of motion.   Pulmonary/Chest: Effort normal. Right breast exhibits no mass, no nipple discharge and no skin change. Left breast exhibits no mass, no nipple discharge and no skin change. No breast tenderness or discharge.   Abdominal: Soft. She exhibits no distension. There is no tenderness. There is no guarding.   Genitourinary: Vagina normal, uterus normal and cervix normal. Right adnexum displays no tenderness. Left adnexum displays no tenderness. No vaginal discharge found.   Musculoskeletal: Normal range of motion.   Neurological: She is alert and oriented to person, place, and time.   Skin: Skin is warm and dry.   Psychiatric: She has a normal mood and affect. Her behavior is normal.            Assessment/Plan     Assessment     Problem List Items Addressed This Visit     None      Visit Diagnoses     Encounter for annual routine gynecological examination    -  Primary    Relevant Orders    Pap IG, Rfx HPV ASCU          1. GYN annual well woman exam.   2.     Plan     1. Recommended use of Vitamin D replacement and getting adequate calcium in her diet. (1500mg)  2. Reviewed monthly self breast exams.  Instructed to call with lumps, pain, or breast discharge.    3. Reviewed exercise as a " preventative health measures.   4. RTC in 1 year or PRN with problems      Ani Rosa MD  09/10/2020

## 2020-12-16 NOTE — TELEPHONE ENCOUNTER
Calling for ocp refill to [facility]. She states the pharmacy has reached out to us several times with no reply. She was seen for an annual in September. norethindrone-ethinyl estradiol-ferrous fumarate (LOMEDIA 24 FE) 1-20 MG-MCG(24) per tabletnorethindrone-ethinyl estradiol-ferrous fumarate (LOMEDIA 24 FE) 1-20 MG-MCG(24) per tablet

## 2021-08-19 ENCOUNTER — OFFICE VISIT (OUTPATIENT)
Dept: OBSTETRICS AND GYNECOLOGY | Facility: CLINIC | Age: 36
End: 2021-08-19

## 2021-08-19 ENCOUNTER — PRE-ADMISSION TESTING (OUTPATIENT)
Dept: PREADMISSION TESTING | Facility: HOSPITAL | Age: 36
End: 2021-08-19

## 2021-08-19 ENCOUNTER — TELEPHONE (OUTPATIENT)
Dept: OBSTETRICS AND GYNECOLOGY | Facility: CLINIC | Age: 36
End: 2021-08-19

## 2021-08-19 VITALS
SYSTOLIC BLOOD PRESSURE: 138 MMHG | HEIGHT: 67 IN | WEIGHT: 155 LBS | BODY MASS INDEX: 24.33 KG/M2 | DIASTOLIC BLOOD PRESSURE: 88 MMHG

## 2021-08-19 DIAGNOSIS — O02.0 BLIGHTED OVUM: ICD-10-CM

## 2021-08-19 DIAGNOSIS — O03.9 MISCARRIAGE: Primary | ICD-10-CM

## 2021-08-19 LAB
FLUAV SUBTYP SPEC NAA+PROBE: NOT DETECTED
FLUBV RNA ISLT QL NAA+PROBE: NOT DETECTED
SARS-COV-2 RNA PNL SPEC NAA+PROBE: NOT DETECTED

## 2021-08-19 PROCEDURE — 87636 SARSCOV2 & INF A&B AMP PRB: CPT

## 2021-08-19 PROCEDURE — 99214 OFFICE O/P EST MOD 30 MIN: CPT | Performed by: OBSTETRICS & GYNECOLOGY

## 2021-08-19 PROCEDURE — C9803 HOPD COVID-19 SPEC COLLECT: HCPCS

## 2021-08-19 RX ORDER — MISOPROSTOL 200 UG/1
TABLET ORAL
Qty: 8 TABLET | Refills: 0 | Status: SHIPPED | OUTPATIENT
Start: 2021-08-19 | End: 2021-09-09

## 2021-08-19 NOTE — PROGRESS NOTES
"Chief Complaint   Patient presents with   • Miscarriage          HPI  Natasha Beckett is a 36 y.o. female, , who presents with for a NOB appointment.    Recent Tests:  She had a urine pregnancy test that was done 1 month ago that was positive.    US today: Yes.  Findings showed gs measuring 10w2d with no cardiac activity. I reviewed US. She has not had prenatal care.  She denies associated abdominal or pelvic pain. Her past medical history is non-contributory.  She denies passage of tissue.  Rh Status: Positive  She also complains of nausea.    The additional following portions of the patient's history were reviewed and updated as appropriate: allergies, current medications, past family history, past medical history, past social history, past surgical history and problem list.    Review of Systems   Constitutional: Negative.    HENT: Negative.    Eyes: Negative.    Respiratory: Negative.    Cardiovascular: Negative.    Gastrointestinal: Negative.    Endocrine: Negative.    Genitourinary: Negative.    Musculoskeletal: Negative.    Skin: Negative.    Allergic/Immunologic: Negative.    Neurological: Negative.    Hematological: Negative.    Psychiatric/Behavioral: Negative.      All other systems reviewed and are negative.     I have reviewed and agree with the HPI, ROS, and historical information as entered above. Ani Rosa MD    Objective   /88   Ht 170.2 cm (67\")   Wt 70.3 kg (155 lb)   LMP 2021   Breastfeeding No   BMI 24.28 kg/m²     Physical Exam  Vitals and nursing note reviewed.   Constitutional:       Appearance: Normal appearance.   HENT:      Head: Normocephalic and atraumatic.   Eyes:      General: No scleral icterus.     Pupils: Pupils are equal, round, and reactive to light.   Pulmonary:      Effort: Pulmonary effort is normal.      Breath sounds: Normal breath sounds.   Abdominal:      General: Bowel sounds are normal. There is no distension.      Palpations: Abdomen is soft. "      Tenderness: There is no abdominal tenderness.   Musculoskeletal:         General: Normal range of motion.      Cervical back: Normal range of motion and neck supple.      Right lower leg: No edema.      Left lower leg: No edema.   Skin:     General: Skin is warm and dry.   Neurological:      General: No focal deficit present.      Mental Status: She is alert and oriented to person, place, and time.   Psychiatric:         Mood and Affect: Mood normal.         Behavior: Behavior normal. Behavior is cooperative.            Assessment and Plan    Problem List Items Addressed This Visit     None      Visit Diagnoses     Miscarriage    -  Primary    Blighted ovum              1. Blighted Ovum  2. Options discussed with patient.  3. Cytotec prescribed.  Bleeding precautions reviewed.  4. Report to ED if saturating a pad greater than every 30-60 mins.  5. Rh+/  No follow-ups on file.    Counseling was given to patient for the following topics: diagnostic results, instructions for management, risk factor reductions and prognosis . Total time of the encounter was 30 minutes     Ani Rosa MD  08/19/2021

## 2021-08-19 NOTE — TELEPHONE ENCOUNTER
Patient called and says she has decided to go through with the D&C, told her we would call back with the surgery info

## 2021-08-19 NOTE — TELEPHONE ENCOUNTER
Spoke with patient notified of surgery tomorrow @ 12 will need to arrive @ 10:45 for check in, nothing to eat or drink past midnight tonight, is having rapid covid test today on her way now at [facility] main registration.

## 2021-08-20 ENCOUNTER — OUTSIDE FACILITY SERVICE (OUTPATIENT)
Dept: OBSTETRICS AND GYNECOLOGY | Facility: CLINIC | Age: 36
End: 2021-08-20

## 2021-08-20 ENCOUNTER — LAB REQUISITION (OUTPATIENT)
Dept: LAB | Facility: HOSPITAL | Age: 36
End: 2021-08-20

## 2021-08-20 DIAGNOSIS — O02.1 MISSED ABORTION: ICD-10-CM

## 2021-08-20 PROCEDURE — 88305 TISSUE EXAM BY PATHOLOGIST: CPT | Performed by: OBSTETRICS & GYNECOLOGY

## 2021-08-20 PROCEDURE — 59820 CARE OF MISCARRIAGE: CPT | Performed by: OBSTETRICS & GYNECOLOGY

## 2021-08-23 LAB
CYTO UR: NORMAL
LAB AP CASE REPORT: NORMAL
LAB AP CLINICAL INFORMATION: NORMAL
PATH REPORT.FINAL DX SPEC: NORMAL
PATH REPORT.GROSS SPEC: NORMAL

## 2021-09-09 ENCOUNTER — OFFICE VISIT (OUTPATIENT)
Dept: OBSTETRICS AND GYNECOLOGY | Facility: CLINIC | Age: 36
End: 2021-09-09

## 2021-09-09 VITALS
HEIGHT: 67 IN | BODY MASS INDEX: 23.07 KG/M2 | WEIGHT: 147 LBS | SYSTOLIC BLOOD PRESSURE: 100 MMHG | DIASTOLIC BLOOD PRESSURE: 64 MMHG

## 2021-09-09 DIAGNOSIS — O02.0 BLIGHTED OVUM: Primary | ICD-10-CM

## 2021-09-09 PROCEDURE — 99024 POSTOP FOLLOW-UP VISIT: CPT | Performed by: OBSTETRICS & GYNECOLOGY

## 2021-09-09 NOTE — PROGRESS NOTES
"Post-Op Visit    No chief complaint on file.      HPI  Natasha Beckett is a 36 y.o. female who returns for a post-operative follow-up visit after undergoing suction D & C for blighted ovum on 8-.      Path:  C/w POC    Since the patient was last seen, she reports no complaints. She denies heavy bleeding, increased pain, fever, and vaginal odor.  She feels like she may have ovulated this past Sunday.         The additional following portions of the patient's history were reviewed and updated as appropriate: allergies, current medications, past family history, past medical history, past social history, past surgical history and problem list.    Review of Systems   Constitutional: Negative.    HENT: Negative.    Respiratory: Negative.    Cardiovascular: Negative.    Gastrointestinal: Negative.    Genitourinary: Negative.    Musculoskeletal: Negative.    Skin: Negative.    Allergic/Immunologic: Negative.    Neurological: Negative.    Hematological: Negative.    Psychiatric/Behavioral: Negative.        I have reviewed and agree with the HPI, ROS, and historical information as entered above. Ani Rosa MD    Objective   /64   Ht 170.2 cm (67\")   Wt 66.7 kg (147 lb)   LMP 06/09/2021   Breastfeeding No   BMI 23.02 kg/m²     Physical Exam  Vitals and nursing note reviewed.   Constitutional:       Appearance: Normal appearance.   HENT:      Head: Normocephalic and atraumatic.   Eyes:      General: No scleral icterus.     Pupils: Pupils are equal, round, and reactive to light.   Pulmonary:      Effort: Pulmonary effort is normal.      Breath sounds: Normal breath sounds.   Abdominal:      General: Bowel sounds are normal. There is no distension.      Palpations: Abdomen is soft.      Tenderness: There is no abdominal tenderness.   Musculoskeletal:         General: Normal range of motion.      Cervical back: Normal range of motion and neck supple.      Right lower leg: No edema.      Left lower " leg: No edema.   Skin:     General: Skin is warm and dry.   Neurological:      General: No focal deficit present.      Mental Status: She is alert and oriented to person, place, and time.   Psychiatric:         Mood and Affect: Mood normal.         Behavior: Behavior normal. Behavior is cooperative.         Assessment/Plan     Assessment     Problem List Items Addressed This Visit     None      Visit Diagnoses     Blighted ovum    -  Primary    s/p D&C    Relevant Orders    HCG, B-subunit, Quantitative            Plan:  Orders Placed This Encounter   Procedures   • HCG, B-subunit, Quantitative     Order Specific Question:   Release to patient     Answer:   Immediate       Instructions:  1. The patient has done well after surgery with no apparent complications. I have discussed the postoperative course to date, as well as, the expected progress going forward.  The patient understands what complications to be concerned about. I will see the patient in routine follow-up, or sooner if needed.  2. hcg today, and follow to zero      Ani Rosa MD   Answers for HPI/ROS submitted by the patient on 9/7/2021  Please describe your symptoms.: Post Op Appt  Have you had these symptoms before?: No  How long have you been having these symptoms?: Greater than 2 weeks  Please list any medications you are currently taking for this condition.: n/a  Please describe any probable cause for these symptoms. : n/a  What is the primary reason for your visit?: Other

## 2021-09-10 DIAGNOSIS — O03.9 MISCARRIAGE: Primary | ICD-10-CM

## 2021-09-10 LAB — HCG INTACT+B SERPL-ACNC: 7.4 MIU/ML

## 2021-09-21 ENCOUNTER — LAB (OUTPATIENT)
Dept: OBSTETRICS AND GYNECOLOGY | Facility: CLINIC | Age: 36
End: 2021-09-21

## 2021-09-21 LAB — HCG INTACT+B SERPL-ACNC: <0.5 MIU/ML

## 2021-11-11 ENCOUNTER — OFFICE VISIT (OUTPATIENT)
Dept: OBSTETRICS AND GYNECOLOGY | Facility: CLINIC | Age: 36
End: 2021-11-11

## 2021-11-11 VITALS
BODY MASS INDEX: 23.32 KG/M2 | SYSTOLIC BLOOD PRESSURE: 100 MMHG | HEIGHT: 67 IN | WEIGHT: 148.6 LBS | DIASTOLIC BLOOD PRESSURE: 70 MMHG

## 2021-11-11 DIAGNOSIS — Z01.419 PAP TEST, AS PART OF ROUTINE GYNECOLOGICAL EXAMINATION: Primary | ICD-10-CM

## 2021-11-11 PROCEDURE — 99395 PREV VISIT EST AGE 18-39: CPT | Performed by: NURSE PRACTITIONER

## 2021-11-11 NOTE — PROGRESS NOTES
GYN Annual Exam     CC - Here for annual exam.        HPI  Natasha Beckett is a 36 y.o. female, , who presents for annual well woman exam. Patient's last menstrual period was 10/24/2021..  Periods are regular every 25-35 days, lasting 6 days. .  Dysmenorrhea:mild, occurring first 1-2 days of flow.  Patient reports problems with: none.  There were no changes to her medical or surgical history since her last visit.. Partner Status: Marital Status: .  She is sexually active. She has not had new partners since her last STD testing. She does not desire STD testing.      Patient denies any other issues or concerns at today's visit.     Additional OB/GYN History   Current contraception: contraceptive methods: None  Desires to: do not start contraception  Last Pap :   Last Completed Pap Smear          Ordered - PAP SMEAR (Every 3 Years) Ordered on 2021    09/10/2020  SCANNED - PAP SMEAR    08/15/2019  Done - negative    10/01/2017  Done              History of abnormal Pap smear: no  Family history of uterine, colon, breast, or ovarian cancer: no  Performs monthly Self-Breast Exam: yes  Exercises Regularly:yes  Feelings of Anxiety or Depression: no  Tobacco Usage?: No   OB History        2    Para   1    Term   1            AB        Living   1       SAB        IAB        Ectopic        Molar        Multiple        Live Births   1                Health Maintenance   Topic Date Due   • COVID-19 Vaccine (1) Never done   • ANNUAL PHYSICAL  2018   • HEPATITIS C SCREENING  Never done   • INFLUENZA VACCINE  2021   • Annual Gynecologic Pelvic and Breast Exam  2021   • PAP SMEAR  09/10/2023   • TDAP/TD VACCINES (2 - Td or Tdap) 2027   • Pneumococcal Vaccine 0-64  Aged Out       The additional following portions of the patient's history were reviewed and updated as appropriate: allergies, current medications, past family history, past medical history, past social  "history and past surgical history.    Review of Systems   Constitutional: Negative.    HENT: Negative.    Eyes: Negative.    Respiratory: Negative.    Cardiovascular: Negative.    Gastrointestinal: Negative.    Endocrine: Negative.    Genitourinary: Negative.    Musculoskeletal: Negative.    Skin: Negative.    Allergic/Immunologic: Negative.    Neurological: Negative.    Hematological: Negative.    Psychiatric/Behavioral: Negative.          I have reviewed and agree with the HPI, ROS, and historical information as entered above. Gracie Engel Chrissie, APRN    Objective   /70   Ht 170.2 cm (67\")   Wt 67.4 kg (148 lb 9.6 oz)   LMP 10/24/2021   Breastfeeding No   BMI 23.27 kg/m²     Physical Exam  Vitals and nursing note reviewed. Exam conducted with a chaperone present.   Constitutional:       General: She is not in acute distress.     Appearance: Normal appearance. She is well-developed and normal weight. She is not ill-appearing.   HENT:      Head: Normocephalic and atraumatic.   Neck:      Thyroid: No thyroid mass or thyromegaly.   Cardiovascular:      Heart sounds: No murmur heard.      Pulmonary:      Effort: Pulmonary effort is normal. No retractions.   Chest:      Chest wall: No mass.   Breasts:      Right: Normal. No mass, nipple discharge, skin change or tenderness.      Left: Normal. No mass, nipple discharge, skin change or tenderness.       Abdominal:      Palpations: Abdomen is soft. Abdomen is not rigid. There is no mass.      Tenderness: There is no abdominal tenderness. There is no guarding.      Hernia: No hernia is present. There is no hernia in the left inguinal area.   Genitourinary:     General: Normal vulva.      Labia:         Right: No rash, tenderness or lesion.         Left: No rash, tenderness or lesion.       Vagina: Normal. No vaginal discharge or lesions.      Cervix: Normal.      Uterus: Normal. Not enlarged, not fixed and not tender.       Adnexa: Right adnexa normal and left adnexa " normal.        Right: No mass or tenderness.          Left: No mass or tenderness.        Rectum: No external hemorrhoid.   Musculoskeletal:      Cervical back: Normal range of motion. No muscular tenderness.   Skin:     General: Skin is warm and dry.   Neurological:      Mental Status: She is alert and oriented to person, place, and time.   Psychiatric:         Mood and Affect: Mood normal.         Behavior: Behavior normal.            Assessment and Plan    Problem List Items Addressed This Visit     None      Visit Diagnoses     Pap test, as part of routine gynecological examination    -  Primary    Relevant Orders    Pap IG, HPV-hr          1. GYN annual well woman exam.   2. Reviewed monthly self breast exams.  Instructed to call with lumps, pain, or breast discharge.    3. Reviewed exercise as a preventative health measures.   4. Reccommended Flu Vaccine in Fall of each year.  5. RTC in 1 year or PRN with problems  Return in about 1 year (around 11/11/2022) for Annual physical.      Gracie Dickens, APRN  11/11/2021

## 2021-11-19 DIAGNOSIS — Z01.419 PAP TEST, AS PART OF ROUTINE GYNECOLOGICAL EXAMINATION: ICD-10-CM

## 2022-05-24 DIAGNOSIS — Z34.90 PREGNANCY, UNSPECIFIED GESTATIONAL AGE: Primary | ICD-10-CM

## 2022-06-23 ENCOUNTER — INITIAL PRENATAL (OUTPATIENT)
Dept: OBSTETRICS AND GYNECOLOGY | Facility: CLINIC | Age: 37
End: 2022-06-23

## 2022-06-23 VITALS — DIASTOLIC BLOOD PRESSURE: 70 MMHG | WEIGHT: 154.4 LBS | SYSTOLIC BLOOD PRESSURE: 118 MMHG | BODY MASS INDEX: 24.18 KG/M2

## 2022-06-23 DIAGNOSIS — Z3A.09 9 WEEKS GESTATION OF PREGNANCY: Primary | ICD-10-CM

## 2022-06-23 PROBLEM — J18.9 PNEUMONIA: Status: RESOLVED | Noted: 2019-01-06 | Resolved: 2022-06-23

## 2022-06-23 PROBLEM — O09.529 ANTEPARTUM MULTIGRAVIDA OF ADVANCED MATERNAL AGE: Status: ACTIVE | Noted: 2022-06-23

## 2022-06-23 PROBLEM — Z34.90 PREGNANCY: Status: ACTIVE | Noted: 2022-06-23

## 2022-06-23 PROBLEM — A41.9 SEPSIS: Status: RESOLVED | Noted: 2019-01-06 | Resolved: 2022-06-23

## 2022-06-23 PROCEDURE — 0501F PRENATAL FLOW SHEET: CPT | Performed by: OBSTETRICS & GYNECOLOGY

## 2022-06-23 NOTE — PROGRESS NOTES
Initial ob visit     CC- Here for care of pregnancy        Natasha Beckett is a 37 y.o. female, , who presents for her first obstetrical visit.  Her last LMP was Patient's last menstrual period was 2022..    # 1 - Date: None, Sex: None, Weight: None, GA: None, Delivery: None, Apgar1: None, Apgar5: None, Living: None, Birth Comments: None    # 2 - Date: 10/04/15, Sex: Male, Weight: 2551 g (5 lb 10 oz), GA: 37w0d, Delivery: None, Apgar1: None, Apgar5: None, Living: Living, Birth Comments: None    # 3 - Date: None, Sex: None, Weight: None, GA: None, Delivery: None, Apgar1: None, Apgar5: None, Living: None, Birth Comments: None      Current obstetric complaints : none   Initial positive test date :      Location : home UPT    Prior obstetric issues, none  Potential pregnancy concerns: AMA  Family history of genetic issues (includes FOB): FOB and son have mata syndrome and son is autistic  Prior infections concerning in pregnancy (Rash, fever in last 2 weeks): denies   Varicella Hx -yes  Prior testing for Cystic Fibrosis Carrier or Sickle Cell Trait- unknown.  Pt had genetic testing d/t son's autism  Prepregnancy BMI - Body mass index is 24.18 kg/m².  Hx of HSV for patient or partner : no     Additional Pertinent History   Last Pap :   Last Completed Pap Smear          Ordered - PAP SMEAR (Every 3 Years) Ordered on 2021  SCANNED - PAP SMEAR    09/10/2020  SCANNED - PAP SMEAR    08/15/2019  Done - negative    10/01/2017  Done              History of abnormal Pap smear: no  Family history of uterine, colon, breast, or ovarian cancer: yes - MGM had breast cancer, PGF had colon cancer  Exercises Regularly: yes - .  Feelings of Anxiety or Depression: no  Tobacco Usage?: No     The additional following portions of the patient's history were reviewed and updated as appropriate: allergies, current medications, past family history, past medical history, past social history, past  surgical history and problem list.    Review of Systems   Review of Systems  Constitutional : Nausea, fatigue :denies   : Vaginal bleeding, cramping :denies  Breast Tenderness : admits  All systems reviewed and neg    /70   Wt 70 kg (154 lb 6.4 oz)   LMP 2022   BMI 24.18 kg/m²     Physical Exam  General Appearance:    Alert, cooperative, in no acute distress   Head:    Normocephalic, without obvious abnormality, atraumatic   Eyes:            Lids and lashes normal, conjunctivae and sclerae normal, no icterus, no pallor, corneas clear   Ears:    Ears appear intact with no abnormalities noted       Neck:      Neck without masses or thyromegaly    Abdomen:    Soft without masses or tenderness   :           Neck:   No adenopathy, supple, trachea midline, no thyromegaly   Back:     No kyphosis present, no scoliosis present,                       Extremities:   Moves all extremities well, no edema, no cyanosis   Skin:   No bleeding, bruising or rash   Lymph nodes:   No palpable adenopathy   Neurologic:   Sensation intact, A&O times 3        Assessment & Plan   Assessment     Problem List Items Addressed This Visit     Pregnancy - Primary    Overview     Prev  37 wks 5#10oz           Relevant Orders    Obstetric Panel    Chlamydia trachomatis, Neisseria gonorrhoeae, PCR w/ confirmation - Urine, Urinary Bladder    HIV-1 / O / 2 Ag / Antibody 4th Generation    Urinalysis With Microscopic - Urine, Clean Catch    Urine Culture - Urine, Urine, Clean Catch    Urine Drug Screen - Urine, Clean Catch    [id number] PLUS Core+SCA+ESS - Blood,          1. Pregnancy at 9w1d  2. AMA    Plan     1. Reviewed routine prenatal care with the office and educational materials given  2. Lab(s) Ordered  3. Patient is on Prenatal vitamins and encouraged additional folic acid supplementation.  4. Activity recommendation : 150 minutes/week of moderate intensity aerobic activity unless we limit for bleeding, hypertension or  other pregnancy complication   5. Counseled on genetic testing options including CF DNA, carrier testing including CF, SMA, and Fragile X,  and NT screening.  6. Follow up: 4 week(s)        Ani Rosa MD  06/23/2022

## 2022-06-30 LAB
5P15 DELETION (CRI-DU-CHAT): NOT DETECTED
ABO GROUP BLD: ABNORMAL
AMPHETAMINES UR QL SCN: NEGATIVE NG/ML
APPEARANCE UR: CLEAR
BACTERIA #/AREA URNS HPF: ABNORMAL /[HPF]
BACTERIA UR CULT: NORMAL
BACTERIA UR CULT: NORMAL
BARBITURATES UR QL SCN: NEGATIVE NG/ML
BASOPHILS # BLD AUTO: 0 X10E3/UL (ref 0–0.2)
BASOPHILS NFR BLD AUTO: 0 %
BENZODIAZ UR QL SCN: NEGATIVE NG/ML
BILIRUB UR QL STRIP: NEGATIVE
BLD GP AB SCN SERPL QL: NEGATIVE
BZE UR QL SCN: NEGATIVE NG/ML
C TRACH RRNA SPEC QL NAA+PROBE: NEGATIVE
CANNABINOIDS UR QL SCN: NEGATIVE NG/ML
CASTS URNS QL MICRO: ABNORMAL /LPF
CFDNA.FET/CFDNA.TOTAL SFR FETUS: NORMAL %
CITATION REF LAB TEST: NORMAL
COLOR UR: YELLOW
CREAT UR-MCNC: 16.2 MG/DL (ref 20–300)
EOSINOPHIL # BLD AUTO: 0 X10E3/UL (ref 0–0.4)
EOSINOPHIL NFR BLD AUTO: 0 %
EPI CELLS #/AREA URNS HPF: ABNORMAL /HPF (ref 0–10)
ERYTHROCYTE [DISTWIDTH] IN BLOOD BY AUTOMATED COUNT: 11.2 % (ref 11.7–15.4)
FET 13+18+21+X+Y ANEUP PLAS.CFDNA: NEGATIVE
FET 1P36 DEL RISK WBC.DNA+CFDNA QL: NOT DETECTED
FET 22Q11.2 DEL RISK WBC.DNA+CFDNA QL: NOT DETECTED
FET CHR 11Q23 DEL PLAS.CFDNA QL: NOT DETECTED
FET CHR 15Q11 DEL PLAS.CFDNA QL: NOT DETECTED
FET CHR 21 TS PLAS.CFDNA QL: NEGATIVE
FET CHR 4P16 DEL PLAS.CFDNA QL: NOT DETECTED
FET CHR 8Q24 DEL PLAS.CFDNA QL: NOT DETECTED
FET MS X RISK WBC.DNA+CFDNA QL: NOT DETECTED
FET SEX PLAS.CFDNA DOSAGE CFDNA: NORMAL
FET TS 13 RISK PLAS.CFDNA QL: NEGATIVE
FET TS 18 RISK WBC.DNA+CFDNA QL: NEGATIVE
FET X + Y ANEUP RISK PLAS.CFDNA SEQ-IMP: NOT DETECTED
GA EST FROM CONCEPTION DATE: NORMAL D
GESTATIONAL AGE > 9:: YES
GLUCOSE UR QL STRIP: NEGATIVE
HBV SURFACE AG SERPL QL IA: NEGATIVE
HCT VFR BLD AUTO: 38.8 % (ref 34–46.6)
HCV AB S/CO SERPL IA: <0.1 S/CO RATIO (ref 0–0.9)
HGB BLD-MCNC: 13.3 G/DL (ref 11.1–15.9)
HGB UR QL STRIP: NEGATIVE
HIV 1+2 AB+HIV1 P24 AG SERPL QL IA: NON REACTIVE
IMM GRANULOCYTES # BLD AUTO: 0 X10E3/UL (ref 0–0.1)
IMM GRANULOCYTES NFR BLD AUTO: 0 %
KETONES UR QL STRIP: NEGATIVE
LAB DIRECTOR NAME PROVIDER: NORMAL
LAB DIRECTOR NAME PROVIDER: NORMAL
LABORATORY COMMENT REPORT: ABNORMAL
LABORATORY COMMENT REPORT: NORMAL
LEUKOCYTE ESTERASE UR QL STRIP: ABNORMAL
LIMITATIONS OF THE TEST: NORMAL
LYMPHOCYTES # BLD AUTO: 1.2 X10E3/UL (ref 0.7–3.1)
LYMPHOCYTES NFR BLD AUTO: 19 %
MCH RBC QN AUTO: 32.1 PG (ref 26.6–33)
MCHC RBC AUTO-ENTMCNC: 34.3 G/DL (ref 31.5–35.7)
MCV RBC AUTO: 94 FL (ref 79–97)
METHADONE UR QL SCN: NEGATIVE NG/ML
MICRO URNS: ABNORMAL
MONOCYTES # BLD AUTO: 0.4 X10E3/UL (ref 0.1–0.9)
MONOCYTES NFR BLD AUTO: 6 %
MUCOUS THREADS URNS QL MICRO: PRESENT
N GONORRHOEA RRNA SPEC QL NAA+PROBE: NEGATIVE
NEGATIVE PREDICTIVE VALUE: NORMAL
NEUTROPHILS # BLD AUTO: 4.8 X10E3/UL (ref 1.4–7)
NEUTROPHILS NFR BLD AUTO: 75 %
NITRITE UR QL STRIP: NEGATIVE
NOTE: NORMAL
OPIATES UR QL SCN: NEGATIVE NG/ML
OXYCODONE+OXYMORPHONE UR QL SCN: NEGATIVE NG/ML
PCP UR QL: NEGATIVE NG/ML
PERFORMANCE CHARACTERISTICS: NORMAL
PH UR STRIP: 7.5 [PH] (ref 5–7.5)
PH UR: 7.4 [PH] (ref 4.5–8.9)
PLATELET # BLD AUTO: 248 X10E3/UL (ref 150–450)
POSITIVE PREDICTIVE VALUE: NORMAL
PROPOXYPH UR QL SCN: NEGATIVE NG/ML
PROT UR QL STRIP: NEGATIVE
RBC # BLD AUTO: 4.14 X10E6/UL (ref 3.77–5.28)
RBC #/AREA URNS HPF: ABNORMAL /HPF (ref 0–2)
REF LAB TEST METHOD: NORMAL
RH BLD: POSITIVE
RPR SER QL: NON REACTIVE
RUBV IGG SERPL IA-ACNC: 0.98 INDEX
SP GR UR STRIP: 1.01 (ref 1–1.03)
SP GR UR: 1
TEST PERFORMANCE INFO SPEC: NORMAL
TRIOSOMY 16: NOT DETECTED
TRISOMY 22: NOT DETECTED
UROBILINOGEN UR STRIP-MCNC: 0.2 MG/DL (ref 0.2–1)
WBC # BLD AUTO: 6.4 X10E3/UL (ref 3.4–10.8)
WBC #/AREA URNS HPF: ABNORMAL /HPF (ref 0–5)

## 2022-07-21 ENCOUNTER — ROUTINE PRENATAL (OUTPATIENT)
Dept: OBSTETRICS AND GYNECOLOGY | Facility: CLINIC | Age: 37
End: 2022-07-21

## 2022-07-21 VITALS — WEIGHT: 156.8 LBS | SYSTOLIC BLOOD PRESSURE: 110 MMHG | BODY MASS INDEX: 24.56 KG/M2 | DIASTOLIC BLOOD PRESSURE: 76 MMHG

## 2022-07-21 DIAGNOSIS — Z3A.13 13 WEEKS GESTATION OF PREGNANCY: Primary | ICD-10-CM

## 2022-07-21 DIAGNOSIS — O09.529 ANTEPARTUM MULTIGRAVIDA OF ADVANCED MATERNAL AGE: ICD-10-CM

## 2022-07-21 LAB
GLUCOSE UR STRIP-MCNC: NEGATIVE MG/DL
PROT UR STRIP-MCNC: NEGATIVE MG/DL

## 2022-07-21 PROCEDURE — 0502F SUBSEQUENT PRENATAL CARE: CPT | Performed by: OBSTETRICS & GYNECOLOGY

## 2022-07-21 NOTE — PROGRESS NOTES
OB FOLLOW UP  CC- Here for care of pregnancy        Natasha Beckett is a 37 y.o.  13w1d patient being seen today for her obstetrical follow up visit. Patient reports fatigue.  cfdna neg/boy. NOB labs reviewed.  Patient declines all vaccines.    Her prenatal care is complicated by (and status) :   Patient Active Problem List   Diagnosis   • Pregnancy   • Antepartum multigravida of advanced maternal age       Desires genetic testing?: Yes with Gender, (already performed and was negative/boy)    Ultrasound Today: No    ROS -   Patient Denies: Loss of Fluid, Vaginal Spotting, Vision Changes, Headaches, Nausea  and Vomiting   All other systems reviewed and are negative.     The additional following portions of the patient's history were reviewed and updated as appropriate: allergies, current medications, past family history, past medical history, past social history, past surgical history and problem list.    I have reviewed and agree with the HPI, ROS, and historical information as entered above. Ani Rosa MD    /76   Wt 71.1 kg (156 lb 12.8 oz)   LMP 2022   BMI 24.56 kg/m²         EXAM:     Prenatal Vitals  BP: 110/76  Weight: 71.1 kg (156 lb 12.8 oz)   Fetal Heart Rate: +          Urine Glucose Read-only: Negative  Urine Protein Read-only: Negative       Assessment and Plan    Problem List Items Addressed This Visit     Pregnancy - Primary    Overview     Prev  37 wks 5#10oz           Relevant Orders    POC Urinalysis Dipstick (Completed)    Antepartum multigravida of advanced maternal age    Overview     CfDNA low risk  PDC negro US                 1. Pregnancy at 13w1d  2. Labs reviewed from New OB Visit.  3. Counseled on genetic testing, carrier status and option for NT screen- low risk  4. Activity and Exercise discussed.  5. Patient is on Prenatal vitamins  Return in about 1 month (around 2022) for F/U Prenatal.    Ani Rosa MD  2022   Statement Selected

## 2022-08-18 ENCOUNTER — ROUTINE PRENATAL (OUTPATIENT)
Dept: OBSTETRICS AND GYNECOLOGY | Facility: CLINIC | Age: 37
End: 2022-08-18

## 2022-08-18 VITALS — DIASTOLIC BLOOD PRESSURE: 76 MMHG | BODY MASS INDEX: 24.93 KG/M2 | WEIGHT: 159.2 LBS | SYSTOLIC BLOOD PRESSURE: 118 MMHG

## 2022-08-18 DIAGNOSIS — Z3A.17 17 WEEKS GESTATION OF PREGNANCY: Primary | ICD-10-CM

## 2022-08-18 DIAGNOSIS — O09.529 ANTEPARTUM MULTIGRAVIDA OF ADVANCED MATERNAL AGE: ICD-10-CM

## 2022-08-18 LAB
GLUCOSE UR STRIP-MCNC: NEGATIVE MG/DL
PROT UR STRIP-MCNC: NEGATIVE MG/DL

## 2022-08-18 PROCEDURE — 0502F SUBSEQUENT PRENATAL CARE: CPT | Performed by: OBSTETRICS & GYNECOLOGY

## 2022-08-18 NOTE — PROGRESS NOTES
OB FOLLOW UP  CC- Here for care of pregnancy        Natasha Beckett is a 37 y.o.  17w1d patient being seen today for her obstetrical follow up visit. Patient reports no complaints.    Her prenatal care is complicated by (and status) : AMA  Patient Active Problem List   Diagnosis   • Pregnancy   • Antepartum multigravida of advanced maternal age       Ultrasound Today: No    AFP: desires    ROS -   Patient Denies: Loss of Fluid, Vaginal Spotting, Vision Changes, Headaches, Nausea , Vomiting , Contractions and Epigastric pain  Fetal Movement : starting to feel  All other systems reviewed and are negative.       The additional following portions of the patient's history were reviewed and updated as appropriate: allergies, current medications, past family history, past medical history, past social history, past surgical history and problem list.    I have reviewed and agree with the HPI, ROS, and historical information as entered above. Ani Rosa MD        EXAM:     Prenatal Vitals  BP: 118/76  Weight: 72.2 kg (159 lb 3.2 oz)   Fetal Heart Rate: +   Pelvic Exam:        Urine Glucose Read-only: Negative  Urine Protein Read-only: Negative           Assessment and Plan    Problem List Items Addressed This Visit     Pregnancy - Primary    Overview     Prev  37 wks 5#10oz         Relevant Orders    POC Urinalysis Dipstick (Completed)    Antepartum multigravida of advanced maternal age    Overview     CfDNA low risk  PDC negro US         Relevant Orders    Alpha Fetoprotein, Maternal    [facility] [place]          1. Pregnancy at 17w1d  2. Fetal status reassuring.   3. Counseled on MSAFP alone in relation to OTD and placental issues.  - today  4. Anatomy scan next visit.   5. Activity and Exercise discussed.  6. Patient is on Prenatal vitamins  Return in about 3 weeks (around 2022) for F/U Prenatal, Refer - [facility].    Ani Rosa MD  2022

## 2022-08-20 LAB
AFP INTERP SERPL-IMP: NORMAL
AFP INTERP SERPL-IMP: NORMAL
AFP MOM SERPL: 0.77
AFP SERPL-MCNC: 30.1 NG/ML
AGE AT DELIVERY: 38 YR
GA METHOD: NORMAL
GA: 17.1 WEEKS
IDDM PATIENT QL: NO
LABORATORY COMMENT REPORT: NORMAL
MULTIPLE PREGNANCY: NO
NEURAL TUBE DEFECT RISK FETUS: NORMAL %
RESULT: NORMAL

## 2022-09-08 ENCOUNTER — ROUTINE PRENATAL (OUTPATIENT)
Dept: OBSTETRICS AND GYNECOLOGY | Facility: CLINIC | Age: 37
End: 2022-09-08

## 2022-09-08 ENCOUNTER — HOSPITAL ENCOUNTER (OUTPATIENT)
Dept: WOMENS IMAGING | Facility: HOSPITAL | Age: 37
Discharge: HOME | End: 2022-09-08
Admitting: OBSTETRICS & GYNECOLOGY

## 2022-09-08 ENCOUNTER — OFFICE VISIT (OUTPATIENT)
Dept: OBSTETRICS AND GYNECOLOGY | Facility: HOSPITAL | Age: 37
End: 2022-09-08

## 2022-09-08 VITALS
HEIGHT: 66 IN | BODY MASS INDEX: 26.55 KG/M2 | WEIGHT: 165.2 LBS | DIASTOLIC BLOOD PRESSURE: 73 MMHG | SYSTOLIC BLOOD PRESSURE: 113 MMHG

## 2022-09-08 VITALS — BODY MASS INDEX: 26.91 KG/M2 | WEIGHT: 164.2 LBS | SYSTOLIC BLOOD PRESSURE: 114 MMHG | DIASTOLIC BLOOD PRESSURE: 62 MMHG

## 2022-09-08 DIAGNOSIS — O09.529 ANTEPARTUM MULTIGRAVIDA OF ADVANCED MATERNAL AGE: Primary | ICD-10-CM

## 2022-09-08 DIAGNOSIS — Z82.79 FAMILY HISTORY OF CONGENITAL HEART DEFECT: ICD-10-CM

## 2022-09-08 DIAGNOSIS — Z3A.20 20 WEEKS GESTATION OF PREGNANCY: Primary | ICD-10-CM

## 2022-09-08 DIAGNOSIS — O09.529 ANTEPARTUM MULTIGRAVIDA OF ADVANCED MATERNAL AGE: ICD-10-CM

## 2022-09-08 DIAGNOSIS — Z34.90 PREGNANCY, UNSPECIFIED GESTATIONAL AGE: ICD-10-CM

## 2022-09-08 LAB
GLUCOSE UR STRIP-MCNC: NEGATIVE MG/DL
PROT UR STRIP-MCNC: NEGATIVE MG/DL

## 2022-09-08 PROCEDURE — 0502F SUBSEQUENT PRENATAL CARE: CPT | Performed by: OBSTETRICS & GYNECOLOGY

## 2022-09-08 PROCEDURE — 76811 OB US DETAILED SNGL FETUS: CPT

## 2022-09-08 PROCEDURE — 76811 OB US DETAILED SNGL FETUS: CPT | Performed by: OBSTETRICS & GYNECOLOGY

## 2022-09-08 NOTE — PROGRESS NOTES
Documentation of the ultasound findings, images, and interpretations will be available in the patient's Viewpoint report located in the Chart Review Imaging tab in Periscope.

## 2022-09-08 NOTE — PROGRESS NOTES
OB FOLLOW UP  CC- Here for care of pregnancy        Natasha Beckett is a 37 y.o.  20w1d patient being seen today for her obstetrical follow up visit. Patient reports occasional heartburn.     Her prenatal care is complicated by (and status) :   Patient Active Problem List   Diagnosis   • Pregnancy   • Antepartum multigravida of advanced maternal age   • Family history of congenital heart defect       Ultrasound Today: Yes at [facility]. Unable to view report    ROS -   Patient Denies: Loss of Fluid, Vaginal Spotting, Vision Changes, Headaches, Nausea , Vomiting , Contractions and Epigastric pain  Fetal Movement : normal  All other systems reviewed and are negative.       The additional following portions of the patient's history were reviewed and updated as appropriate: allergies, current medications, past family history, past medical history, past social history, past surgical history and problem list.    I have reviewed and agree with the HPI, ROS, and historical information as entered above. Ani Rosa MD    /62   Wt 74.5 kg (164 lb 3.2 oz)   LMP 2022   BMI 26.91 kg/m²       EXAM:     Prenatal Vitals  BP: 114/62  Weight: 74.5 kg (164 lb 3.2 oz)   Fetal Heart Rate: +          Urine Glucose Read-only: Negative  Urine Protein Read-only: Negative       Assessment and Plan    Problem List Items Addressed This Visit     Pregnancy - Primary    Overview     Prev  37 wks 5#10oz         Relevant Orders    POC Urinalysis Dipstick (Completed)    Antepartum multigravida of advanced maternal age    Overview     CfDNA low risk  [facility]         Family history of congenital heart defect    Overview     Fetal echo 24 wks               1. Pregnancy at 20w1d  2. Anatomy scan today is done today at [facility]. no report yet, but normal per patient. Has FU for fetal echo in 4 wks  3. Fetal status reassuring.   4. Activity and Exercise discussed.  5. Patient is on Prenatal vitamins  Return in about 1  month (around 10/8/2022) for F/U Prenatal.    Ani Rosa MD  09/08/2022

## 2022-09-26 ENCOUNTER — TELEPHONE (OUTPATIENT)
Dept: OBSTETRICS AND GYNECOLOGY | Facility: CLINIC | Age: 37
End: 2022-09-26

## 2022-09-26 NOTE — TELEPHONE ENCOUNTER
Dr. Rosa OB pt.   22w5d  MBT: A +    S/w pt she states she has been having some right lower back pain that radiates into her right leg and it has been causing pain with walking and also after she sits for a while and then gets up. Patient states she has tried a belly band and it does not help at all. Patient also has tried tylenol that has not helped either.     Patient denies: trying heating pad, at home stretches.     I advised patient to try a heating pad today/tonight and I would discuss this with Dr. Rosa tomorrow and CB with plan of care.

## 2022-09-26 NOTE — TELEPHONE ENCOUNTER
Pt reports bad back pain for past 2 wks., one sided. PT wants to know if there is anything she can do to try to improve it. Pt has tried bellyband but hasn't helped.

## 2022-09-27 RX ORDER — CYCLOBENZAPRINE HCL 10 MG
10 TABLET ORAL NIGHTLY PRN
Qty: 10 TABLET | Refills: 0 | Status: SHIPPED | OUTPATIENT
Start: 2022-09-27 | End: 2022-12-22

## 2022-09-27 NOTE — TELEPHONE ENCOUNTER
Per Dr. Rosa: Yes either sounds good ryanne. I will send in flexaril for her to try at night, and then if she wants to see PT, go ahead and put in referral. Thanks.        sammi for pt to CB

## 2022-09-27 NOTE — TELEPHONE ENCOUNTER
S/w pt she v/u Dr. Rosas recommendations and states she will try the flexeril RX for now and if that does not help or she changes her mind she will CB for PT referral. (Dr. Rosa has already okay'd)

## 2022-10-11 ENCOUNTER — HOSPITAL ENCOUNTER (OUTPATIENT)
Facility: HOSPITAL | Age: 37
Discharge: HOME | End: 2022-10-12
Attending: EMERGENCY MEDICINE | Admitting: OBSTETRICS & GYNECOLOGY

## 2022-10-11 ENCOUNTER — APPOINTMENT (OUTPATIENT)
Dept: ULTRASOUND IMAGING | Facility: HOSPITAL | Age: 37
End: 2022-10-11

## 2022-10-11 VITALS
WEIGHT: 165 LBS | SYSTOLIC BLOOD PRESSURE: 114 MMHG | DIASTOLIC BLOOD PRESSURE: 75 MMHG | HEIGHT: 67 IN | TEMPERATURE: 97.7 F | BODY MASS INDEX: 25.9 KG/M2 | HEART RATE: 85 BPM | RESPIRATION RATE: 18 BRPM | OXYGEN SATURATION: 99 %

## 2022-10-11 DIAGNOSIS — Z3A.25 25 WEEKS GESTATION OF PREGNANCY: ICD-10-CM

## 2022-10-11 DIAGNOSIS — R10.9 LEFT FLANK PAIN: Primary | ICD-10-CM

## 2022-10-11 DIAGNOSIS — R31.9 HEMATURIA, UNSPECIFIED TYPE: ICD-10-CM

## 2022-10-11 LAB
ALBUMIN SERPL-MCNC: 3.7 G/DL (ref 3.5–5.2)
ALBUMIN/GLOB SERPL: 1.4 G/DL
ALP SERPL-CCNC: 57 U/L (ref 39–117)
ALT SERPL W P-5'-P-CCNC: 46 U/L (ref 1–33)
ANION GAP SERPL CALCULATED.3IONS-SCNC: 12 MMOL/L (ref 5–15)
AST SERPL-CCNC: 28 U/L (ref 1–32)
B-HCG UR QL: POSITIVE
BACTERIA UR QL AUTO: ABNORMAL /HPF
BASOPHILS # BLD AUTO: 0.01 10*3/MM3 (ref 0–0.2)
BASOPHILS NFR BLD AUTO: 0.1 % (ref 0–1.5)
BILIRUB SERPL-MCNC: 0.2 MG/DL (ref 0–1.2)
BILIRUB UR QL STRIP: NEGATIVE
BUN SERPL-MCNC: 10 MG/DL (ref 6–20)
BUN/CREAT SERPL: 17.2 (ref 7–25)
CALCIUM SPEC-SCNC: 9 MG/DL (ref 8.6–10.5)
CHLORIDE SERPL-SCNC: 104 MMOL/L (ref 98–107)
CLARITY UR: ABNORMAL
CO2 SERPL-SCNC: 22 MMOL/L (ref 22–29)
COLOR UR: YELLOW
CREAT SERPL-MCNC: 0.58 MG/DL (ref 0.57–1)
DEPRECATED RDW RBC AUTO: 41.1 FL (ref 37–54)
EGFRCR SERPLBLD CKD-EPI 2021: 119.7 ML/MIN/1.73
EOSINOPHIL # BLD AUTO: 0.03 10*3/MM3 (ref 0–0.4)
EOSINOPHIL NFR BLD AUTO: 0.3 % (ref 0.3–6.2)
ERYTHROCYTE [DISTWIDTH] IN BLOOD BY AUTOMATED COUNT: 12.4 % (ref 12.3–15.4)
EXPIRATION DATE: ABNORMAL
GLOBULIN UR ELPH-MCNC: 2.6 GM/DL
GLUCOSE SERPL-MCNC: 81 MG/DL (ref 65–99)
GLUCOSE UR STRIP-MCNC: NEGATIVE MG/DL
HCT VFR BLD AUTO: 34.5 % (ref 34–46.6)
HGB BLD-MCNC: 12.3 G/DL (ref 12–15.9)
HGB UR QL STRIP.AUTO: ABNORMAL
HOLD SPECIMEN: NORMAL
HYALINE CASTS UR QL AUTO: ABNORMAL /LPF
IMM GRANULOCYTES # BLD AUTO: 0.02 10*3/MM3 (ref 0–0.05)
IMM GRANULOCYTES NFR BLD AUTO: 0.2 % (ref 0–0.5)
INTERNAL NEGATIVE CONTROL: NEGATIVE
INTERNAL POSITIVE CONTROL: POSITIVE
KETONES UR QL STRIP: ABNORMAL
LEUKOCYTE ESTERASE UR QL STRIP.AUTO: ABNORMAL
LIPASE SERPL-CCNC: 29 U/L (ref 13–60)
LYMPHOCYTES # BLD AUTO: 0.76 10*3/MM3 (ref 0.7–3.1)
LYMPHOCYTES NFR BLD AUTO: 8.7 % (ref 19.6–45.3)
Lab: ABNORMAL
MCH RBC QN AUTO: 32.5 PG (ref 26.6–33)
MCHC RBC AUTO-ENTMCNC: 35.7 G/DL (ref 31.5–35.7)
MCV RBC AUTO: 91.3 FL (ref 79–97)
MONOCYTES # BLD AUTO: 0.47 10*3/MM3 (ref 0.1–0.9)
MONOCYTES NFR BLD AUTO: 5.4 % (ref 5–12)
NEUTROPHILS NFR BLD AUTO: 7.46 10*3/MM3 (ref 1.7–7)
NEUTROPHILS NFR BLD AUTO: 85.3 % (ref 42.7–76)
NITRITE UR QL STRIP: NEGATIVE
NRBC BLD AUTO-RTO: 0 /100 WBC (ref 0–0.2)
PH UR STRIP.AUTO: 6 [PH] (ref 5–8)
PLATELET # BLD AUTO: 181 10*3/MM3 (ref 140–450)
PMV BLD AUTO: 10.2 FL (ref 6–12)
POTASSIUM SERPL-SCNC: 3.9 MMOL/L (ref 3.5–5.2)
PROT SERPL-MCNC: 6.3 G/DL (ref 6–8.5)
PROT UR QL STRIP: NEGATIVE
RBC # BLD AUTO: 3.78 10*6/MM3 (ref 3.77–5.28)
RBC # UR STRIP: ABNORMAL /HPF
REF LAB TEST METHOD: ABNORMAL
SODIUM SERPL-SCNC: 138 MMOL/L (ref 136–145)
SP GR UR STRIP: 1.02 (ref 1–1.03)
SQUAMOUS #/AREA URNS HPF: ABNORMAL /HPF
UROBILINOGEN UR QL STRIP: ABNORMAL
WBC # UR STRIP: ABNORMAL /HPF
WBC NRBC COR # BLD: 8.75 10*3/MM3 (ref 3.4–10.8)
WHOLE BLOOD HOLD COAG: NORMAL
WHOLE BLOOD HOLD SPECIMEN: NORMAL

## 2022-10-11 PROCEDURE — 81025 URINE PREGNANCY TEST: CPT

## 2022-10-11 PROCEDURE — 99284 EMERGENCY DEPT VISIT MOD MDM: CPT

## 2022-10-11 PROCEDURE — 85025 COMPLETE CBC W/AUTO DIFF WBC: CPT

## 2022-10-11 PROCEDURE — 80053 COMPREHEN METABOLIC PANEL: CPT

## 2022-10-11 PROCEDURE — G0463 HOSPITAL OUTPT CLINIC VISIT: HCPCS

## 2022-10-11 PROCEDURE — 81001 URINALYSIS AUTO W/SCOPE: CPT

## 2022-10-11 PROCEDURE — 83690 ASSAY OF LIPASE: CPT

## 2022-10-11 PROCEDURE — 81025 URINE PREGNANCY TEST: CPT | Performed by: EMERGENCY MEDICINE

## 2022-10-11 PROCEDURE — 76775 US EXAM ABDO BACK WALL LIM: CPT

## 2022-10-11 RX ORDER — SODIUM CHLORIDE 9 MG/ML
10 INJECTION INTRAVENOUS AS NEEDED
Status: DISCONTINUED | OUTPATIENT
Start: 2022-10-11 | End: 2022-10-12 | Stop reason: HOSPADM

## 2022-10-12 ENCOUNTER — HOSPITAL ENCOUNTER (OUTPATIENT)
Facility: HOSPITAL | Age: 37
End: 2022-10-12
Attending: OBSTETRICS & GYNECOLOGY | Admitting: OBSTETRICS & GYNECOLOGY

## 2022-10-12 ENCOUNTER — HOSPITAL ENCOUNTER (OUTPATIENT)
Facility: HOSPITAL | Age: 37
Discharge: HOME | End: 2022-10-12
Attending: OBSTETRICS & GYNECOLOGY | Admitting: OBSTETRICS & GYNECOLOGY

## 2022-10-12 VITALS
DIASTOLIC BLOOD PRESSURE: 66 MMHG | RESPIRATION RATE: 16 BRPM | HEART RATE: 93 BPM | SYSTOLIC BLOOD PRESSURE: 104 MMHG | TEMPERATURE: 98.2 F

## 2022-10-12 PROBLEM — O26.892 ABDOMINAL PAIN DURING PREGNANCY IN SECOND TRIMESTER: Status: ACTIVE | Noted: 2022-10-12

## 2022-10-12 PROBLEM — R10.9 ABDOMINAL PAIN DURING PREGNANCY IN SECOND TRIMESTER: Status: ACTIVE | Noted: 2022-10-12

## 2022-10-12 PROBLEM — R31.9 HEMATURIA: Status: ACTIVE | Noted: 2022-10-12

## 2022-10-12 PROCEDURE — G0463 HOSPITAL OUTPT CLINIC VISIT: HCPCS

## 2022-10-12 PROCEDURE — 96360 HYDRATION IV INFUSION INIT: CPT

## 2022-10-12 RX ORDER — SODIUM CHLORIDE 0.9 % (FLUSH) 0.9 %
10 SYRINGE (ML) INJECTION EVERY 12 HOURS SCHEDULED
Status: DISCONTINUED | OUTPATIENT
Start: 2022-10-12 | End: 2022-10-13 | Stop reason: HOSPADM

## 2022-10-12 RX ORDER — SODIUM CHLORIDE, SODIUM LACTATE, POTASSIUM CHLORIDE, CALCIUM CHLORIDE 600; 310; 30; 20 MG/100ML; MG/100ML; MG/100ML; MG/100ML
1000 INJECTION, SOLUTION INTRAVENOUS CONTINUOUS
Status: ACTIVE | OUTPATIENT
Start: 2022-10-12 | End: 2022-10-12

## 2022-10-12 RX ORDER — SODIUM CHLORIDE 0.9 % (FLUSH) 0.9 %
10 SYRINGE (ML) INJECTION AS NEEDED
Status: DISCONTINUED | OUTPATIENT
Start: 2022-10-12 | End: 2022-10-13 | Stop reason: HOSPADM

## 2022-10-12 RX ORDER — SODIUM CHLORIDE, SODIUM LACTATE, POTASSIUM CHLORIDE, CALCIUM CHLORIDE 600; 310; 30; 20 MG/100ML; MG/100ML; MG/100ML; MG/100ML
1000 INJECTION, SOLUTION INTRAVENOUS ONCE
Status: DISCONTINUED | OUTPATIENT
Start: 2022-10-12 | End: 2022-10-12 | Stop reason: SDUPTHER

## 2022-10-12 RX ADMIN — SODIUM CHLORIDE, POTASSIUM CHLORIDE, SODIUM LACTATE AND CALCIUM CHLORIDE 1000 ML: 600; 310; 30; 20 INJECTION, SOLUTION INTRAVENOUS at 21:00

## 2022-10-12 NOTE — H&P
[facility]  Obstetric History and Physical    Chief Complaint   Patient presents with   • Flank Pain     HPI:    Patient is a 37 y.o. female  currently at 25w0d, who presented to the E.R earlier this afternoon with an acute onset of left sided flank pain.  While she was being worked up, she had spontaneous resolution of the pain.  She was sent to L&D for fetal monitoring.         The following portions of the patients history were reviewed and updated as appropriate: current medications, allergies, past medical history, past surgical history, past family history, past social history and problem list .       Prenatal Information:   Maternal Prenatal Labs  Blood Type No results found for: ABO   Rh Status No results found for: RH   Antibody Screen No results found for: ABSCRN   Gonnorhea No results found for: GCCX   Chlamydia No results found for: CLAMYDCU   RPR No results found for: RPR   Syphilis Antibody No results found for: SYPHILIS   Rubella No results found for: RUBELLAIGGIN   Hepatitis B Surface Antigen No results found for: HEPBSAG   HIV-1 Antibody No results found for: LABHIV1   Hepatitis C Antibody No results found for: HEPCAB   Rapid Urin Drug Screen No results found for: AMPMETHU, BARBITSCNUR, LABBENZSCN, LABMETHSCN, LABOPIASCN, THCURSCR, COCAINEUR, AMPHETSCREEN, PROPOXSCN, BUPRENORSCNU, METAMPSCNUR, OXYCODONESCN, TRICYCLICSCN   Group B Strep Culture No results found for: GBSANTIGEN           External Prenatal Results     Pregnancy Outside Results - Transcribed From Office Records - See Scanned Records For Details     Test Value Date Time    ABO  A  22 1004    Rh  Positive  22 1004    Antibody Screen  Negative  22 1004    Varicella IgG       Rubella  0.98 index 22 1004    Hgb  12.3 g/dL 10/11/22 1915       13.3 g/dL 22 1004    Hct  34.5 % 10/11/22 1915       38.8 % 22 1004    Glucose Fasting GTT       Glucose Tolerance Test 1 hour       Glucose Tolerance Test 3  hour       Gonorrhea (discrete)  Negative  22 1004    Chlamydia (discrete)  Negative  22 1004    RPR  Non Reactive  22 1004    VDRL       Syphilis Antibody       HBsAg  Negative  22 1004    Herpes Simplex Virus PCR       Herpes Simplex VIrus Culture       HIV  Non Reactive  22 1004    Hep C RNA Quant PCR       Hep C Antibody  <0.1 s/co ratio 22 1004    AFP  30.1 ng/mL 22 1056    Group B Strep       GBS Susceptibility to Clindamycin       GBS Susceptibility to Erythromycin       Fetal Fibronectin       Genetic Testing, Maternal Blood             Drug Screening     Test Value Date Time    Urine Drug Screen       Amphetamine Screen  Negative ng/mL 22 1004    Barbiturate Screen  Negative ng/mL 22 1004    Benzodiazepine Screen  Negative ng/mL 22 1004    Methadone Screen  Negative ng/mL 22 1004    Phencyclidine Screen  Negative ng/mL 22 1004    Opiates Screen       THC Screen       Cocaine Screen       Propoxyphene Screen  Negative ng/mL 22 1004    Buprenorphine Screen       Methamphetamine Screen       Oxycodone Screen       Tricyclic Antidepressants Screen             Legend    ^: Historical                          Past OB History:     OB History    Para Term  AB Living   3 1 1 0 1 1   SAB IAB Ectopic Molar Multiple Live Births   1 0 0 0 0 1      # Outcome Date GA Lbr Caesar/2nd Weight Sex Delivery Anes PTL Lv   3 Current            2 SAB 21 13w0d             Birth Comments: had D&C      Complications: Blighted ovum   1 Term 10/04/15 37w6d  2551 g (5 lb 10 oz) M Vag-Spont EPI N KENNY      Birth Comments: 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      Obstetric Comments   FOB #1 : Pregnancy #1; #2; #3        Past Medical History: Past Medical History:   Diagnosis Date   • Acid reflux     no longer an issue, per patient (2020)   • Pneumonia 2019      Past Surgical History Past Surgical History:   Procedure Laterality Date   •  D & C WITH SUCTION  8/20/21   • WISDOM TOOTH EXTRACTION  10/2002      Family History: Family History   Problem Relation Age of Onset   • Hypertension Father    • Stroke Father    • COPD Maternal Grandmother    • Arthritis Maternal Grandmother    • Clotting disorder Maternal Grandmother    • Breast cancer Maternal Grandmother    • Hypertension Maternal Grandmother    • Deep vein thrombosis Maternal Grandmother    • Diabetes Maternal Grandmother    • Hyperlipidemia Maternal Grandfather    • Hypertension Maternal Grandfather    • Hypertension Paternal Grandfather       Social History:  reports that she has never smoked. She has never used smokeless tobacco.   reports that she does not currently use alcohol.   reports no history of drug use.        Review of Systems  Denies fever, HA, CP, Shortness of air, muscle weakness,                                             and rashes      Objective     Vital Signs Range for the last 24 hours  Temperature: Temp:  [97.7 °F (36.5 °C)-98.3 °F (36.8 °C)] 97.7 °F (36.5 °C)   Temp Source: Temp src: Oral   BP: BP: (103-120)/(66-84) 114/75   Pulse: Heart Rate:  [] 85   Respirations: Resp:  [18-22] 18   SPO2: SpO2:  [96 %-100 %] 99 %   O2 Amount (l/min):     O2 Devices Device (Oxygen Therapy): room air   Weight: Weight:  [74.8 kg (165 lb)] 74.8 kg (165 lb)     Physical Examination: General appearance - alert, well appearing, and in no distress and oriented to person, place, and time  Chest - clear to auscultation, no wheezes, rales or rhonchi, symmetric air entry  Heart - S1 and S2 normal, no murmurs noted  Abdomen - soft, nontender, nondistended, no masses or organomegaly  no rebound tenderness noted  bowel sounds normal  No guarding, No RUQ pain  Back:  No CVA tenderness   Extremities - pedal edema  - none                           Fetal Heart Rate Assessment   Method: Fetal HR Assessment Method: external   Beats/min: Fetal HR (beats/min): 140   Baseline: Fetal HR Baseline:  normal range   Varibility: Fetal HR Variability: moderate (amplitude range 6 to 25 bpm)   Accels: Fetal HR Accelerations: greater than/equal to 10 bpm (32 wks gest or less), lasts at least 10 seconds (32 wks gest or less)   Decels: Fetal HR Decelerations: absent   Tracing Category:       Uterine Assessment   Method: Method: external tocotransducer   Frequency (min):     Ctx Count in 10 min:     Duration:     Intensity: Contraction Intensity: no contractions   Intensity by IUPC:     Resting Tone: Uterine Resting Tone: soft by palpation   Resting Tone by IUPC:           Laboratory Results:   Lab Results   Component Value Date     10/11/2022    HGB 12.3 10/11/2022    HCT 34.5 10/11/2022    WBC 8.75 10/11/2022     Lab Results   Component Value Date    SQUAMEPIUA 3-6 (A) 10/11/2022    SPECGRAVUR 1.018 10/11/2022    KETONESU 40 mg/dL (2+) (A) 10/11/2022    BLOODU Moderate (2+) (A) 10/11/2022    LEUKOCYTESUR Small (1+) (A) 10/11/2022    NITRITEU Negative 10/11/2022    RBCUA Too Numerous to Count (A) 10/11/2022    WBCUA 6-12 (A) 10/11/2022    BACTERIA 2+ (A) 10/11/2022             Assessment/Plan  1. Intrauterine pregnancy at 25w0d weeks gestation with reassuring fetal status  2.  Flank pain - worked up by E.R now resolved without intervention.   Likely renal      calculi   3.  Reviewed signs and symptoms concerning for return for further evaluation.       recommend that she just presents to L&D for further issues during this      pregnancy   4.  Questions answered  5.  D/C home.    Keep scheduled appointment on Thursday with Dr. Rosa.        Levi Whatley MD  10/12/2022  01:50 EDT

## 2022-10-12 NOTE — ED PROVIDER NOTES
EMERGENCY DEPARTMENT ENCOUNTER    Pt Name: Natasha Beckett  MRN: [id number]  Pt :   1985  Room Number:    Date of encounter:  10/11/2022  PCP: Provider, No Known  ED Provider: AUDRA Rod    Historian: Patient      HPI:  Chief Complaint: Left flank pain        Context: Natasha Beckett is a 37 y.o. female who presents to the ED c/o sudden onset of left flank pain that she rated as a 8 out of 10 which lasted for several hours and resolved spontaneously around 7 PM.  Patient has no history of kidney stones.  She has had no vaginal bleeding.  She is a  3 para 1 female who is 25 weeks pregnant.  Her prenatal care thus far has been uneventful.    Review of systems is negative for fever chills or recent illness.  Negative for chest pain or cough or shortness of breath.  Negative for nausea, vomiting, diarrhea or constipation.  Negative for vaginal bleeding.  Negative for pelvic pain.  Positive for aforementioned flank pain now resolved.  No gross hematuria.  No dysuria, frequency or urgency.      PAST MEDICAL HISTORY  Past Medical History:   Diagnosis Date   • Acid reflux     no longer an issue, per patient (2020)   • Pneumonia 2019         PAST SURGICAL HISTORY  Past Surgical History:   Procedure Laterality Date   • D & C WITH SUCTION  21   • WISDOM TOOTH EXTRACTION  10/2002         FAMILY HISTORY  Family History   Problem Relation Age of Onset   • Hypertension Father    • Stroke Father    • COPD Maternal Grandmother    • Arthritis Maternal Grandmother    • Clotting disorder Maternal Grandmother    • Breast cancer Maternal Grandmother    • Hypertension Maternal Grandmother    • Deep vein thrombosis Maternal Grandmother    • Diabetes Maternal Grandmother    • Hyperlipidemia Maternal Grandfather    • Hypertension Maternal Grandfather    • Hypertension Paternal Grandfather          SOCIAL HISTORY  Social History     Socioeconomic History   • Marital status:     Tobacco Use   • Smoking status: Never   • Smokeless tobacco: Never   Vaping Use   • Vaping Use: Never used   Substance and Sexual Activity   • Alcohol use: Not Currently     Comment: rare use when not pregnant   • Drug use: Never   • Sexual activity: Yes     Partners: Male     Birth control/protection: None     Comment: Discontinued OCP several months ago         ALLERGIES  Patient has no known allergies.        REVIEW OF SYSTEMS  Review of Systems     All systems reviewed and negative except for those discussed in HPI.       PHYSICAL EXAM    I have reviewed the triage vital signs and nursing notes.    ED Triage Vitals [10/11/22 1825]   Temp Heart Rate Resp BP SpO2   98.3 °F (36.8 °C) 97 22 120/84 100 %      Temp src Heart Rate Source Patient Position BP Location FiO2 (%)   Oral Monitor Sitting Left arm --       Physical Exam  GENERAL:   Appears in no acute distress.  Her vital signs are normal.  She is a good historian  HENT: Nares patent.  EYES: No scleral icterus.  CV: Regular rhythm, regular rate.  No tachycardia.  No peripheral edema  RESPIRATORY: Normal effort.  No audible wheezes, rales or rhonchi.  ABDOMEN: Soft, nontender  MUSCULOSKELETAL: No deformities.   NEURO: Alert, moves all extremities, follows commands.  SKIN: Warm, dry, no rash visualized.        LAB RESULTS  Recent Results (from the past 24 hour(s))   Comprehensive Metabolic Panel    Collection Time: 10/11/22  7:15 PM    Specimen: Blood   Result Value Ref Range    Glucose 81 65 - 99 mg/dL    BUN 10 6 - 20 mg/dL    Creatinine 0.58 0.57 - 1.00 mg/dL    Sodium 138 136 - 145 mmol/L    Potassium 3.9 3.5 - 5.2 mmol/L    Chloride 104 98 - 107 mmol/L    CO2 22.0 22.0 - 29.0 mmol/L    Calcium 9.0 8.6 - 10.5 mg/dL    Total Protein 6.3 6.0 - 8.5 g/dL    Albumin 3.70 3.50 - 5.20 g/dL    ALT (SGPT) 46 (H) 1 - 33 U/L    AST (SGOT) 28 1 - 32 U/L    Alkaline Phosphatase 57 39 - 117 U/L    Total Bilirubin 0.2 0.0 - 1.2 mg/dL    Globulin 2.6 gm/dL    A/G Ratio  1.4 g/dL    BUN/Creatinine Ratio 17.2 7.0 - 25.0    Anion Gap 12.0 5.0 - 15.0 mmol/L    eGFR 119.7 >60.0 mL/min/1.73   Lipase    Collection Time: 10/11/22  7:15 PM    Specimen: Blood   Result Value Ref Range    Lipase 29 13 - 60 U/L   Green Top (Gel)    Collection Time: 10/11/22  7:15 PM   Result Value Ref Range    Extra Tube Hold for add-ons.    Lavender Top    Collection Time: 10/11/22  7:15 PM   Result Value Ref Range    Extra Tube hold for add-on    Gold Top - SST    Collection Time: 10/11/22  7:15 PM   Result Value Ref Range    Extra Tube Hold for add-ons.    Gray Top    Collection Time: 10/11/22  7:15 PM   Result Value Ref Range    Extra Tube Hold for add-ons.    Light Blue Top    Collection Time: 10/11/22  7:15 PM   Result Value Ref Range    Extra Tube Hold for add-ons.    CBC Auto Differential    Collection Time: 10/11/22  7:15 PM    Specimen: Blood   Result Value Ref Range    WBC 8.75 3.40 - 10.80 10*3/mm3    RBC 3.78 3.77 - 5.28 10*6/mm3    Hemoglobin 12.3 12.0 - 15.9 g/dL    Hematocrit 34.5 34.0 - 46.6 %    MCV 91.3 79.0 - 97.0 fL    MCH 32.5 26.6 - 33.0 pg    MCHC 35.7 31.5 - 35.7 g/dL    RDW 12.4 12.3 - 15.4 %    RDW-SD 41.1 37.0 - 54.0 fl    MPV 10.2 6.0 - 12.0 fL    Platelets 181 140 - 450 10*3/mm3    Neutrophil % 85.3 (H) 42.7 - 76.0 %    Lymphocyte % 8.7 (L) 19.6 - 45.3 %    Monocyte % 5.4 5.0 - 12.0 %    Eosinophil % 0.3 0.3 - 6.2 %    Basophil % 0.1 0.0 - 1.5 %    Immature Grans % 0.2 0.0 - 0.5 %    Neutrophils, Absolute 7.46 (H) 1.70 - 7.00 10*3/mm3    Lymphocytes, Absolute 0.76 0.70 - 3.10 10*3/mm3    Monocytes, Absolute 0.47 0.10 - 0.90 10*3/mm3    Eosinophils, Absolute 0.03 0.00 - 0.40 10*3/mm3    Basophils, Absolute 0.01 0.00 - 0.20 10*3/mm3    Immature Grans, Absolute 0.02 0.00 - 0.05 10*3/mm3    nRBC 0.0 0.0 - 0.2 /100 WBC   Urinalysis With Microscopic If Indicated (No Culture) - Urine, Clean Catch    Collection Time: 10/11/22  7:34 PM    Specimen: Urine, Clean Catch   Result Value Ref  Range    Color, UA Yellow Yellow, Straw    Appearance, UA Cloudy (A) Clear    pH, UA 6.0 5.0 - 8.0    Specific Gravity, UA 1.018 1.001 - 1.030    Glucose, UA Negative Negative    Ketones, UA 40 mg/dL (2+) (A) Negative    Bilirubin, UA Negative Negative    Blood, UA Moderate (2+) (A) Negative    Protein, UA Negative Negative    Leuk Esterase, UA Small (1+) (A) Negative    Nitrite, UA Negative Negative    Urobilinogen, UA 0.2 E.U./dL 0.2 - 1.0 E.U./dL   Urinalysis, Microscopic Only - Urine, Clean Catch    Collection Time: 10/11/22  7:34 PM    Specimen: Urine, Clean Catch   Result Value Ref Range    RBC, UA Too Numerous to Count (A) None Seen, 0-2 /HPF    WBC, UA 6-12 (A) None Seen, 0-2 /HPF    Bacteria, UA 2+ (A) None Seen, Trace /HPF    Squamous Epithelial Cells, UA 3-6 (A) None Seen, 0-2 /HPF    Hyaline Casts, UA 0-6 0 - 6 /LPF    Methodology Automated Microscopy    POC Urine Pregnancy    Collection Time: 10/11/22  7:49 PM    Specimen: Urine   Result Value Ref Range    HCG, Urine, QL Positive (A)     Lot Number [id number]     Internal Positive Control Positive     Internal Negative Control Negative     Expiration Date 10/31/23        If labs were ordered, I independently reviewed the results.        RADIOLOGY  US Renal Limited    Result Date: 10/11/2022  DATE OF EXAM: 10/11/2022 9:05 PM  PROCEDURE: US RENAL LIMITED-  INDICATIONS: left flank pain suddenly at 3pm; subsided 7pm; hematuria without hx of kidney stones; 25 week preg  COMPARISON: No comparisons available.  TECHNIQUE: Transverse and sagittal grayscale sonographic assessment of the bilateral kidneys and bladder, supplemented with color Doppler.  FINDINGS: Right kidney: The kidney measures 11.8 x 6.1 x 6.1 cm. No renal cortical atrophy. Normal renal cortical echogenicity. There is mild hydronephrosis. There is a simple appearing 2.0 cm midpole cortical cyst. No perinephric fluid. No echogenic/shadowing foci to suggest nephrolithiasis. Normal renal hilar  vascularity on color Doppler assessment.  Left kidney: The kidney measures 13.1 x 5.1 x 4.2 cm. No renal cortical atrophy. Normal renal cortical echogenicity. Mild pelviectasis without hydronephrosis. No perinephric fluid. No echogenic/shadowing foci to suggest nephrolithiasis. Normal renal hilar vascularity on color Doppler assessment.  Bladder: Normal.      Mild right-sided hydronephrosis and mild left-sided pelviectasis. This may relate to hydronephrosis of pregnancy. Otherwise unremarkable appearance of the kidneys. No sonographic evidence of renal calculi.  This report was finalized on 10/11/2022 10:48 PM by Maximus Coughlin MD.          PROCEDURES    Procedures    No orders to display       MEDICATIONS GIVEN IN ER    Medications   Sodium Chloride (PF) 0.9 % 10 mL (has no administration in time range)       ED Course as of 10/11/22 2317   Tue Oct 11, 2022   1957 HCG, Urine QL(!): Positive [MS]   1957 RBC, UA(!): Too Numerous to Count [MS]   1957 Blood, UA(!): Moderate (2+) [MS]   2303 Discussed with the laborist who accepts patient to come to triage L&D [MS]   2303 Renal ultrasound is negative for obvious stones or ureterolithiasis [MS]   2303 .  Patient remains comfortable.  Her vital signs are stable. [MS]      ED Course User Index  [MS] Bonnie Biswas APRN             AS OF 23:17 EDT VITALS:    BP - 106/66  HR - 87  TEMP - 98.3 °F (36.8 °C) (Oral)  O2 SATS - 99%                  DIAGNOSIS  Final diagnoses:   Left flank pain   Hematuria, unspecified type   25 weeks gestation of pregnancy         DISPOSITION     to L&D              Bonnie Biswas APRN  10/11/22 2311       Bonnie Biswas APRN  10/11/22 2317

## 2022-10-13 ENCOUNTER — ROUTINE PRENATAL (OUTPATIENT)
Dept: OBSTETRICS AND GYNECOLOGY | Facility: CLINIC | Age: 37
End: 2022-10-13

## 2022-10-13 ENCOUNTER — HOSPITAL ENCOUNTER (OUTPATIENT)
Dept: WOMENS IMAGING | Facility: HOSPITAL | Age: 37
Discharge: HOME | End: 2022-10-13
Admitting: OBSTETRICS & GYNECOLOGY

## 2022-10-13 ENCOUNTER — OFFICE VISIT (OUTPATIENT)
Dept: OBSTETRICS AND GYNECOLOGY | Facility: HOSPITAL | Age: 37
End: 2022-10-13

## 2022-10-13 VITALS — DIASTOLIC BLOOD PRESSURE: 68 MMHG | WEIGHT: 172.8 LBS | SYSTOLIC BLOOD PRESSURE: 102 MMHG | BODY MASS INDEX: 27.06 KG/M2

## 2022-10-13 VITALS — BODY MASS INDEX: 27.41 KG/M2 | SYSTOLIC BLOOD PRESSURE: 107 MMHG | WEIGHT: 175 LBS | DIASTOLIC BLOOD PRESSURE: 67 MMHG

## 2022-10-13 DIAGNOSIS — Z82.79 FAMILY HISTORY OF CONGENITAL HEART DEFECT: ICD-10-CM

## 2022-10-13 DIAGNOSIS — O09.529 ANTEPARTUM MULTIGRAVIDA OF ADVANCED MATERNAL AGE: ICD-10-CM

## 2022-10-13 DIAGNOSIS — O26.892 ABDOMINAL PAIN DURING PREGNANCY IN SECOND TRIMESTER: Primary | ICD-10-CM

## 2022-10-13 DIAGNOSIS — Z34.90 PREGNANCY, UNSPECIFIED GESTATIONAL AGE: ICD-10-CM

## 2022-10-13 DIAGNOSIS — R10.9 ACUTE LEFT FLANK PAIN: Primary | ICD-10-CM

## 2022-10-13 DIAGNOSIS — R10.9 ABDOMINAL PAIN DURING PREGNANCY IN SECOND TRIMESTER: Primary | ICD-10-CM

## 2022-10-13 LAB
BILIRUB BLD-MCNC: NEGATIVE MG/DL
CLARITY, POC: CLEAR
COLOR UR: YELLOW
GLUCOSE UR STRIP-MCNC: NEGATIVE MG/DL
KETONES UR QL: NEGATIVE
LEUKOCYTE EST, POC: NEGATIVE
NITRITE UR-MCNC: NEGATIVE MG/ML
PH UR: 0.2 [PH] (ref 5–8)
PROT UR STRIP-MCNC: NEGATIVE MG/DL
RBC # UR STRIP: NEGATIVE /UL
SP GR UR: 1.01 (ref 1–1.03)
UROBILINOGEN UR QL: ABNORMAL

## 2022-10-13 PROCEDURE — 76816 OB US FOLLOW-UP PER FETUS: CPT | Performed by: OBSTETRICS & GYNECOLOGY

## 2022-10-13 PROCEDURE — 0502F SUBSEQUENT PRENATAL CARE: CPT | Performed by: NURSE PRACTITIONER

## 2022-10-13 PROCEDURE — 93325 DOPPLER ECHO COLOR FLOW MAPG: CPT | Performed by: OBSTETRICS & GYNECOLOGY

## 2022-10-13 PROCEDURE — 93325 DOPPLER ECHO COLOR FLOW MAPG: CPT

## 2022-10-13 PROCEDURE — 76825 ECHO EXAM OF FETAL HEART: CPT | Performed by: OBSTETRICS & GYNECOLOGY

## 2022-10-13 PROCEDURE — 76816 OB US FOLLOW-UP PER FETUS: CPT

## 2022-10-13 PROCEDURE — 76825 ECHO EXAM OF FETAL HEART: CPT

## 2022-10-13 RX ORDER — TAMSULOSIN HYDROCHLORIDE 0.4 MG/1
1 CAPSULE ORAL DAILY
Qty: 30 CAPSULE | Refills: 2 | Status: SHIPPED | OUTPATIENT
Start: 2022-10-13 | End: 2022-12-22

## 2022-10-13 RX ORDER — FAMOTIDINE 20 MG/1
20 TABLET, FILM COATED ORAL DAILY
Qty: 30 TABLET | Refills: 1 | Status: SHIPPED | OUTPATIENT
Start: 2022-10-13 | End: 2022-12-29

## 2022-10-13 NOTE — PROGRESS NOTES
Reports 2 episodes of left flank pain with hematuria in past week. Renal u/s 10/11/22 did not detect any renal calculi. States FOB spoke with his parents and they are not sure of his cardiac history; they were told he had a hole in his heart when he was a child that eventually closed but don't know a specific diagnosis or exactly when it closed.

## 2022-10-13 NOTE — PROGRESS NOTES
Documentation of the ultasound findings, images, and interpretations will be available in the patient's Viewpoint report located in the Chart Review Imaging tab in [facility].

## 2022-10-13 NOTE — H&P
Triage H&P    Patient Name: Natasha Beckett  : 1985  MRN: [id number]  Date of Service: 10/12/2022  Referring Provider: Ani Rosa     ID: 37 y.o.  at 25w0d    Chief complaint: n/v, intermittent left flank pain with work up in the ER yesterday for potential kidney stones but negative ultrasound for stones last night     Patient Active Problem List    Diagnosis    • *Abdominal pain during pregnancy in second trimester [O26.892, R10.9]    • Family history of congenital heart defect [Z82.79]    • Pregnancy [Z34.90]    • Antepartum multigravida of advanced maternal age [O09.529]        Pregnancy course significant for:    Patient Active Problem List    Diagnosis    • *Abdominal pain during pregnancy in second trimester [O26.892, R10.9]    • Family history of congenital heart defect [Z82.79]    • Pregnancy [Z34.90]    • Antepartum multigravida of advanced maternal age [O09.529]        Her prenatal care is benign.  Her previous obstetric/gynecological history is noted for is non-contributory.    The following portions of the patients history were reviewed and updated as appropriate: current medications, allergies, past medical history, past surgical history, past family history, past social history and problem list.   Pt had pain at 3pm and n/v this evening.  Pt desires IVFH for flushing of kidney stones and states that nausea and vomiting occurs with the pain.  Pain is in flank areas and at worse is 3/10.  None currently.    REVIEW OF SYSTEMS  nausea with vomiting for 1/4 of day   Patient reports good fetal movement.  She denies any vaginal bleeding, leakage of fluid, or contractions.  She denies headache, visual changes, or right upper quadrant pain.  All other systems were reviewed and were negative. Denies fever,  No diarrhea, normal bowel movements    Prenatal Labs  Lab Results   Component Value Date    HGB 12.3 10/11/2022    HEPBSAG Negative 2022    ABORH A Rh Positive 10/04/2015     ABO A 2022    RH Positive 2022    ABSCRN Negative 2022    WOM1MXW5 Non Reactive 2022    HEPCVIRUSABY <0.1 2022    URINECX Final report 2022       OB HISTORY    OB History        3    Para   1    Term   1       0    AB   1    Living   1       SAB   1    IAB   0    Ectopic   0    Molar   0    Multiple   0    Live Births   1          Obstetric Comments   FOB #1 : Pregnancy #1; #2; #3            PAST MEDICAL HISTORY    Past Medical History:   Diagnosis Date   • Acid reflux     no longer an issue, per patient (2020)   • Pneumonia 2019     PAST SURGICAL HISTORY     has a past surgical history that includes d & c with suction (21) and [place] tooth extraction (10/2002).  CURRENT MEDICATIONS    Current Facility-Administered Medications on File Prior to Encounter   Medication Dose Route Frequency Provider Last Rate Last Admin   • [DISCONTINUED] Sodium Chloride (PF) 0.9 % 10 mL  10 mL Intravenous PRN Deyvi Lai, DO         Current Outpatient Medications on File Prior to Encounter   Medication Sig Dispense Refill   • Prenatal Vit-Fe Fumarate-FA (PRENATAL VITAMIN PO) Take  by mouth.     • vitamin D3 125 MCG (5000 UT) capsule capsule Take 1 capsule by mouth Daily.     • cyclobenzaprine (FLEXERIL) 10 MG tablet Take 1 tablet by mouth At Night As Needed for Muscle Spasms. 10 tablet 0     ALLERGIES    Patient has no known allergies.  SOCIAL HISTORY    Social History     Tobacco Use   Smoking Status Never   Smokeless Tobacco Never     Social History     Substance and Sexual Activity   Alcohol Use Not Currently    Comment: rare use when not pregnant     Social History     Substance and Sexual Activity   Drug Use Never       PHYSICAL EXAMINATION    Vital Signs Range for the last 24 hours  Temperature: Temp:  [97.7 °F (36.5 °C)-98.2 °F (36.8 °C)] 98.2 °F (36.8 °C)   Temp Source: Temp src: Oral   BP: BP: (104-114)/(66-75) 104/66   Pulse: Heart Rate:  [85-93] 93    Respirations: Resp:  [16-20] 16   Weight:       Constitutional:  Well developed, well nourished, no acute distress, well-groomed.  HEENT: Grossly normal.  Respiratory:  Normal breath sounds.   Cardiovascular:  Normal rate and rhythm   Abdomen:  Soft, gravid, nontender.No CVAT  Uterus: Soft, nontender. Fundus appropriate for dates.  Neurologic:  Alert & oriented x 4,  no focal deficits noted.   Psychiatric:  Speech and behavior appropriate.   Extremities: no cyanosis, clubbing or edema, no evidence of DVT.      External Fetal Monitoring:    Fetal Heart Rate Assessment   Method:     Beats/min:     Baseline:  150s     Labs:      Lab Results (last 24 hours)     ** No results found for the last 24 hours. **          ASSESSMENT/PLAN:  37 y.o. female  at 25w0d with Abdominal pain during pregnancy in second trimester.    Patient Active Problem List    Diagnosis    • *Abdominal pain during pregnancy in second trimester [O26.892, R10.9]    • Family history of congenital heart defect [Z82.79]    • Pregnancy [Z34.90]    • Antepartum multigravida of advanced maternal age [O09.529]      2L LR IVFH per patient's request for potential kidney stones flushing out with N/V  No signs of NIKKO  Pt has zofran at home    Tami Ibanez MD  10/12/2022  21:18 EDT

## 2022-10-13 NOTE — PROGRESS NOTES
OB FOLLOW UP  CC- Here for care of pregnancy        Natasha Beckett is a 37 y.o.  25w1d patient being seen today for her obstetrical follow up visit. Patient reports left flank pain. Pt states she was seen in ED on 10/11 for left flank pain, told she had hematuria. Renal US showed hydronephrosis which could correlate with pregnancy, no evidence of renal stone. Pt went back to L&D 10/12 due to increased pain, given 2L fluids. She states today pain is still there but not as severe. She states the last 2 days, pain would start around 3pm and last until 7pm, being constant sharp stabbing pain. She denies seeing any stones in urine or hematuria at home. UA completed today. Pt seen by [facility] today for fetal echo, all WNL per pt.    Her prenatal care is complicated by (and status) : AMA  Patient Active Problem List   Diagnosis   • Pregnancy   • Antepartum multigravida of advanced maternal age   • Family history of congenital heart defect   • Abdominal pain during pregnancy in second trimester   • Hematuria       Flu Status: Declines  Ultrasound Today: Yes at [facility]    ROS -   Patient Reports : Nausea and floaters on occasion over the last 2 months  Patient Denies: Loss of Fluid, Vaginal Spotting, Headaches and Contractions  Fetal Movement : normal  All other systems reviewed and are negative.       The additional following portions of the patient's history were reviewed and updated as appropriate: allergies and current medications.    I have reviewed and agree with the HPI, ROS, and historical information as entered above. Darlene Whitney, APRN    /68   Wt 78.4 kg (172 lb 12.8 oz)   LMP 2022   BMI 27.06 kg/m²       EXAM:     Prenatal Vitals  BP: 102/68  Weight: 78.4 kg (172 lb 12.8 oz)                   Urine Glucose Read-only: Negative  Urine Protein Read-only: Negative       Assessment and Plan    Problem List Items Addressed This Visit    None  Visit Diagnoses     Acute left flank pain     -  Primary    Relevant Orders    POC Urinalysis Dipstick (Completed)    Urine Culture - Urine, Urine, Clean Catch          1. Pregnancy at 25w1d  2. Fetal status reassuring.  3. [facility] scan reviewed today.  4. 1 hour gtt, CBC, Antibody screen and TDAP next visit. Instructions given  5. Fetal movement/PTL or Labor precautions  6. Activity and Exercise discussed.  7. Pain has improved, erx'd flomax per Cohen recommendation, increase fludis. To L&D for severe pain or pain with bleeding.   Return in about 3 weeks (around 11/3/2022) for elgin SWEET.    Darlene Whitney, APRN  10/13/2022

## 2022-10-16 LAB
BACTERIA UR CULT: NORMAL
BACTERIA UR CULT: NORMAL

## 2022-11-10 ENCOUNTER — ROUTINE PRENATAL (OUTPATIENT)
Dept: OBSTETRICS AND GYNECOLOGY | Facility: CLINIC | Age: 37
End: 2022-11-10

## 2022-11-10 VITALS — DIASTOLIC BLOOD PRESSURE: 78 MMHG | BODY MASS INDEX: 27.28 KG/M2 | WEIGHT: 174.2 LBS | SYSTOLIC BLOOD PRESSURE: 122 MMHG

## 2022-11-10 DIAGNOSIS — O09.529 ANTEPARTUM MULTIGRAVIDA OF ADVANCED MATERNAL AGE: ICD-10-CM

## 2022-11-10 DIAGNOSIS — Z3A.29 29 WEEKS GESTATION OF PREGNANCY: ICD-10-CM

## 2022-11-10 DIAGNOSIS — Z82.79 FAMILY HISTORY OF CONGENITAL HEART DEFECT: ICD-10-CM

## 2022-11-10 DIAGNOSIS — N20.0 KIDNEY STONE: ICD-10-CM

## 2022-11-10 DIAGNOSIS — Z34.83 PRENATAL CARE, SUBSEQUENT PREGNANCY, THIRD TRIMESTER: Primary | ICD-10-CM

## 2022-11-10 PROBLEM — R31.9 HEMATURIA: Status: RESOLVED | Noted: 2022-10-12 | Resolved: 2022-11-10

## 2022-11-10 PROBLEM — O26.892 ABDOMINAL PAIN DURING PREGNANCY IN SECOND TRIMESTER: Status: RESOLVED | Noted: 2022-10-12 | Resolved: 2022-11-10

## 2022-11-10 PROBLEM — R10.9 ABDOMINAL PAIN DURING PREGNANCY IN SECOND TRIMESTER: Status: RESOLVED | Noted: 2022-10-12 | Resolved: 2022-11-10

## 2022-11-10 LAB
GLUCOSE UR STRIP-MCNC: NEGATIVE MG/DL
PROT UR STRIP-MCNC: NEGATIVE MG/DL

## 2022-11-10 PROCEDURE — 0502F SUBSEQUENT PRENATAL CARE: CPT | Performed by: OBSTETRICS & GYNECOLOGY

## 2022-11-10 RX ORDER — BREAST PUMP
EACH MISCELLANEOUS
Qty: 1 EACH | Refills: 0 | Status: SHIPPED | OUTPATIENT
Start: 2022-11-10

## 2022-11-10 NOTE — PROGRESS NOTES
OB FOLLOW UP  CC- Here for care of pregnancy        Natasha Becektt is a 37 y.o.  29w1d patient being seen today for her obstetrical follow up. Patient reports no complaints. Patient reports that she passed a kidney stone three weeks ago. She has not had any pain since.    Patient undergoing Glucola testing today. She is due for her testing at 10:00.       MBT: A+  OBGCT: in progress  Rhogam: given  28 week packet: provided  Consent discussed  Health care proxy: discussed  Pediatric planning: choices discussed  Smoking cessation discussed never smoked  TDAP: declines  Flu Status: Declines  Ultrasound Today: No    Her prenatal care is complicated by (and status) :  None  Patient Active Problem List   Diagnosis   • Pregnancy   • Antepartum multigravida of advanced maternal age   • Family history of congenital heart defect         ROS -   Patient Reports : No Problems  Patient Denies: Loss of Fluid, Vaginal Spotting, Vision Changes, Headaches, Contractions and Epigastric pain  Fetal Movement : normal    The additional following portions of the patient's history were reviewed and updated as appropriate: allergies, current medications, past family history, past medical history, past social history, past surgical history and problem list.    I have reviewed and agree with the HPI, ROS, and historical information as entered above. Ani Rosa MD    /78   Wt 79 kg (174 lb 3.2 oz)   LMP 2022   BMI 27.28 kg/m²         EXAM:     Prenatal Vitals  BP: 122/78  Weight: 79 kg (174 lb 3.2 oz)   Fetal Heart Rate: +      Fundal Height (cm): 27 cm        Urine Glucose Read-only: Negative  Urine Protein Read-only: Negative       Assessment and Plan    Problem List Items Addressed This Visit     Pregnancy    Overview     Prev  37 wks 5#10oz         Antepartum multigravida of advanced maternal age    Overview     CfDNA low risk  [facility] negro US         Family history of congenital heart defect     Overview     Fetal echo 24 wks        Other Visit Diagnoses     Prenatal care, subsequent pregnancy, third trimester    -  Primary    Relevant Orders    CBC (No Diff)    Antibody Screen    Gestational Screen 1 Hr ([facility])    Kidney stone        Relevant Orders    STONE ANALYSIS - Calculus,          1. Pregnancy at 29w1d.  2. Has [facility] FU in 3 wks.   3. Will send her stone today for analysis  4. 1 hr Glucola, CBC, and antibody screen today  and TDAP declined  5. Fetal movement/PTL or Labor precautions  6. Activity and Exercise discussed.  Return in about 3 weeks (around 12/1/2022) for F/U Prenatal.    Ani Rosa MD  11/10/2022

## 2022-11-11 LAB
BLD GP AB SCN SERPL QL: NEGATIVE
ERYTHROCYTE [DISTWIDTH] IN BLOOD BY AUTOMATED COUNT: 11.9 % (ref 12.3–15.4)
GLUCOSE 1H P 50 G GLC PO SERPL-MCNC: 118 MG/DL (ref 65–139)
HCT VFR BLD AUTO: 35.6 % (ref 34–46.6)
HGB BLD-MCNC: 11.9 G/DL (ref 12–15.9)
MCH RBC QN AUTO: 31.8 PG (ref 26.6–33)
MCHC RBC AUTO-ENTMCNC: 33.4 G/DL (ref 31.5–35.7)
MCV RBC AUTO: 95.2 FL (ref 79–97)
PLATELET # BLD AUTO: 166 10*3/MM3 (ref 140–450)
RBC # BLD AUTO: 3.74 10*6/MM3 (ref 3.77–5.28)
WBC # BLD AUTO: 6.78 10*3/MM3 (ref 3.4–10.8)

## 2022-11-16 LAB
CALCIUM OXALATE DIHYDRATE MFR STONE IR: 30 %
COLOR STONE: NORMAL
COM MFR STONE: 65 %
COMPN STONE: NORMAL
HYDROXYAPATITE: 5 %
LABORATORY COMMENT REPORT: NORMAL
Lab: NORMAL
PHOTO: NORMAL
SIZE STONE: NORMAL MM
SPEC SOURCE SUBJ: NORMAL
STONE ANALYSIS-IMP: NORMAL
WT STONE: 75 MG

## 2022-11-22 ENCOUNTER — APPOINTMENT (OUTPATIENT)
Dept: WOMENS IMAGING | Facility: HOSPITAL | Age: 37
End: 2022-11-22

## 2022-11-29 ENCOUNTER — HOSPITAL ENCOUNTER (OUTPATIENT)
Dept: WOMENS IMAGING | Facility: HOSPITAL | Age: 37
Discharge: HOME | End: 2022-11-29
Admitting: OBSTETRICS & GYNECOLOGY

## 2022-11-29 ENCOUNTER — OFFICE VISIT (OUTPATIENT)
Dept: OBSTETRICS AND GYNECOLOGY | Facility: HOSPITAL | Age: 37
End: 2022-11-29

## 2022-11-29 VITALS — DIASTOLIC BLOOD PRESSURE: 75 MMHG | WEIGHT: 178.8 LBS | BODY MASS INDEX: 28 KG/M2 | SYSTOLIC BLOOD PRESSURE: 112 MMHG

## 2022-11-29 DIAGNOSIS — R10.9 ABDOMINAL PAIN DURING PREGNANCY IN SECOND TRIMESTER: ICD-10-CM

## 2022-11-29 DIAGNOSIS — Z82.79 FAMILY HISTORY OF CONGENITAL HEART DEFECT: Primary | ICD-10-CM

## 2022-11-29 DIAGNOSIS — O26.892 ABDOMINAL PAIN DURING PREGNANCY IN SECOND TRIMESTER: ICD-10-CM

## 2022-11-29 DIAGNOSIS — O09.529 ANTEPARTUM MULTIGRAVIDA OF ADVANCED MATERNAL AGE: ICD-10-CM

## 2022-11-29 DIAGNOSIS — Z82.79 FAMILY HISTORY OF CONGENITAL HEART DEFECT: ICD-10-CM

## 2022-11-29 DIAGNOSIS — Z34.90 PREGNANCY, UNSPECIFIED GESTATIONAL AGE: ICD-10-CM

## 2022-11-29 PROCEDURE — 76820 UMBILICAL ARTERY ECHO: CPT

## 2022-11-29 PROCEDURE — 76816 OB US FOLLOW-UP PER FETUS: CPT

## 2022-11-29 PROCEDURE — 76816 OB US FOLLOW-UP PER FETUS: CPT | Performed by: OBSTETRICS & GYNECOLOGY

## 2022-11-29 PROCEDURE — 76820 UMBILICAL ARTERY ECHO: CPT | Performed by: OBSTETRICS & GYNECOLOGY

## 2022-12-08 ENCOUNTER — ROUTINE PRENATAL (OUTPATIENT)
Dept: OBSTETRICS AND GYNECOLOGY | Facility: CLINIC | Age: 37
End: 2022-12-08

## 2022-12-08 VITALS — BODY MASS INDEX: 28.4 KG/M2 | SYSTOLIC BLOOD PRESSURE: 110 MMHG | WEIGHT: 181.3 LBS | DIASTOLIC BLOOD PRESSURE: 72 MMHG

## 2022-12-08 DIAGNOSIS — Z3A.33 33 WEEKS GESTATION OF PREGNANCY: ICD-10-CM

## 2022-12-08 DIAGNOSIS — Z82.79 FAMILY HISTORY OF CONGENITAL HEART DEFECT: ICD-10-CM

## 2022-12-08 DIAGNOSIS — Z34.83 PRENATAL CARE, SUBSEQUENT PREGNANCY, THIRD TRIMESTER: Primary | ICD-10-CM

## 2022-12-08 DIAGNOSIS — O09.529 ANTEPARTUM MULTIGRAVIDA OF ADVANCED MATERNAL AGE: ICD-10-CM

## 2022-12-08 LAB
GLUCOSE UR STRIP-MCNC: NEGATIVE MG/DL
PROT UR STRIP-MCNC: NEGATIVE MG/DL

## 2022-12-08 PROCEDURE — 0502F SUBSEQUENT PRENATAL CARE: CPT | Performed by: OBSTETRICS & GYNECOLOGY

## 2022-12-08 NOTE — PROGRESS NOTES
OB FOLLOW UP  CC- Here for care of pregnancy        Natasha Beckett is a 37 y.o.  33w1d patient being seen today for her obstetrical follow up visit. Patient reports mild intermittent cramping that started around two weeks ago. She denies vaginal bleeding or LOF.      Her prenatal care is complicated by (and status) :  AMA  Patient Active Problem List   Diagnosis   • Pregnancy   • Antepartum multigravida of advanced maternal age   • Family history of congenital heart defect       Flu Status: Declines  TDAP status: declines  28 week labs: Reviewed  Ultrasound Today: No (Last week at [facility])  Non Stress Test: No.    ROS -   Patient Reports : Cramping  Patient Denies: Loss of Fluid, Vaginal Spotting, Vision Changes, Headaches, Contractions and Epigastric pain  Fetal Movement : normal  All other systems reviewed and are negative.       The additional following portions of the patient's history were reviewed and updated as appropriate: allergies, current medications, past family history, past medical history, past social history, past surgical history and problem list.    I have reviewed and agree with the HPI, ROS, and historical information as entered above. Ani Rosa MD    /72   Wt 82.2 kg (181 lb 4.8 oz)   LMP 2022   BMI 28.40 kg/m²       EXAM:     Prenatal Vitals  BP: 110/72  Weight: 82.2 kg (181 lb 4.8 oz)   Fetal Heart Rate: +      Fundal Height (cm): 32 cm        Urine Glucose Read-only: Negative  Urine Protein Read-only: Negative       Assessment and Plan    Problem List Items Addressed This Visit     Pregnancy    Overview     Prev  37 wks 5#10oz  EFW 52%ile 32 wks         Antepartum multigravida of advanced maternal age    Overview     CfDNA low risk  PDC negro US         Family history of congenital heart defect    Overview     Fetal echo 24 wks        Other Visit Diagnoses     Prenatal care, subsequent pregnancy, third trimester    -  Primary          1. Pregnancy at  33w1d  2. Fetal status reassuring.  3. 28 week labs reviewed.    4. Activity and Exercise discussed.  5. Fetal movement/PTL or Labor precautions  Return in about 2 weeks (around 12/22/2022) for F/U Prenatal.    Ani Rosa MD  12/08/2022

## 2022-12-22 ENCOUNTER — ROUTINE PRENATAL (OUTPATIENT)
Dept: OBSTETRICS AND GYNECOLOGY | Facility: CLINIC | Age: 37
End: 2022-12-22

## 2022-12-22 VITALS — DIASTOLIC BLOOD PRESSURE: 70 MMHG | WEIGHT: 183.4 LBS | SYSTOLIC BLOOD PRESSURE: 110 MMHG | BODY MASS INDEX: 28.72 KG/M2

## 2022-12-22 DIAGNOSIS — Z82.79 FAMILY HISTORY OF CONGENITAL HEART DEFECT: ICD-10-CM

## 2022-12-22 DIAGNOSIS — O09.529 ANTEPARTUM MULTIGRAVIDA OF ADVANCED MATERNAL AGE: Primary | ICD-10-CM

## 2022-12-22 DIAGNOSIS — Z3A.35 35 WEEKS GESTATION OF PREGNANCY: ICD-10-CM

## 2022-12-22 LAB
GLUCOSE UR STRIP-MCNC: NEGATIVE MG/DL
PROT UR STRIP-MCNC: NEGATIVE MG/DL

## 2022-12-22 PROCEDURE — 0502F SUBSEQUENT PRENATAL CARE: CPT | Performed by: OBSTETRICS & GYNECOLOGY

## 2022-12-22 RX ORDER — LEVOMEFOLATE CALCIUM 15 MG
15 TABLET ORAL DAILY
COMMUNITY
End: 2023-03-02

## 2022-12-22 NOTE — PROGRESS NOTES
OB FOLLOW UP  CC- Here for care of pregnancy        Natasha Beckett is a 37 y.o.  35w1d patient being seen today for her obstetrical follow up visit. Patient reports increased pelvic pressure and cramping. She has contacted [facility] for cord blood banking.        Her prenatal care is complicated by (and status) :   Patient Active Problem List   Diagnosis   • Pregnancy   • Antepartum multigravida of advanced maternal age   • Family history of congenital heart defect       GBS Status: next visit    No Known Allergies       Flu Status: Declines  Her Delivery Plan is: Does not desire IOL    US today: no      ROS -   Patient Denies: Loss of Fluid, Vaginal Spotting, Vision Changes, Headaches, Nausea , Vomiting , Contractions and Epigastric pain  Fetal Movement : normal  All other systems reviewed and are negative.       The additional following portions of the patient's history were reviewed and updated as appropriate: allergies, current medications, past family history, past medical history, past social history, past surgical history and problem list.    I have reviewed and agree with the HPI, ROS, and historical information as entered above. Ani Rosa MD      EXAM:     Prenatal Vitals  BP: 110/70  Weight: 83.2 kg (183 lb 6.4 oz)   Fetal Heart Rate: +   Fundal Height (cm): 34 cm          Urine Glucose Read-only: Negative  Urine Protein Read-only: Negative       Assessment and Plan    Problem List Items Addressed This Visit     Pregnancy    Overview     Prev  37 wks 5#10oz  EFW 52%ile 32 wks         Antepartum multigravida of advanced maternal age - Primary    Overview     CfDNA low risk  PDC negro US         Family history of congenital heart defect    Overview     Fetal echo 24 wks            1. Pregnancy at 35w1d  2. Fetal status reassuring.   3. Reviewed Pre-eclampsia signs/symptoms  4. Discussed options for IOL. Patient desires spontaneous labor. Would like to postpone an IOL unless medically  indicated.   5. Delivery options reviewed with patient  6. Signs of labor reviewed  7. Kick counts reviewed  8. Activity and Exercise discussed.  Return in about 1 week (around 12/29/2022) for F/U Prenatal.    Ani Rosa MD  12/22/2022

## 2022-12-29 ENCOUNTER — ROUTINE PRENATAL (OUTPATIENT)
Dept: OBSTETRICS AND GYNECOLOGY | Facility: CLINIC | Age: 37
End: 2022-12-29

## 2022-12-29 ENCOUNTER — LAB (OUTPATIENT)
Dept: LAB | Facility: HOSPITAL | Age: 37
End: 2022-12-29
Payer: OTHER GOVERNMENT

## 2022-12-29 VITALS — BODY MASS INDEX: 29.44 KG/M2 | DIASTOLIC BLOOD PRESSURE: 76 MMHG | WEIGHT: 188 LBS | SYSTOLIC BLOOD PRESSURE: 118 MMHG

## 2022-12-29 DIAGNOSIS — Z34.03 ENCOUNTER FOR SUPERVISION OF NORMAL FIRST PREGNANCY IN THIRD TRIMESTER: Primary | ICD-10-CM

## 2022-12-29 DIAGNOSIS — Z3A.36 36 WEEKS GESTATION OF PREGNANCY: ICD-10-CM

## 2022-12-29 LAB
EXPIRATION DATE: NORMAL
GLUCOSE UR STRIP-MCNC: NEGATIVE MG/DL
Lab: NORMAL
PROT UR STRIP-MCNC: NEGATIVE MG/DL

## 2022-12-29 PROCEDURE — 0502F SUBSEQUENT PRENATAL CARE: CPT | Performed by: NURSE PRACTITIONER

## 2022-12-29 PROCEDURE — 87081 CULTURE SCREEN ONLY: CPT

## 2022-12-29 NOTE — PROGRESS NOTES
OB FOLLOW UP  CC- Here for care of pregnancy        Natasha Beckett is a 37 y.o.  36w1d patient being seen today for her obstetrical follow up visit. Patient reports mild swelling in her ankles.     She denies LOF, vaginal spotting, headaches, vision changes or evro alvarado/contractions.  She reports adequate fetal movements, >10 movements in 10 hours.     Her prenatal care is complicated by (and status) : AMA  Patient Active Problem List   Diagnosis   • Pregnancy   • Antepartum multigravida of advanced maternal age   • Family history of congenital heart defect       GBS Status: Done Today. She is not allergic to PCN.    No Known Allergies       Flu Status: Declines  Her Delivery Plan is: Does not desire IOL    US today: no  Non Stress Test: No.      ROS -   Patient Reports : swelling  Patient Denies: Loss of Fluid, Vaginal Spotting, Vision Changes, Headaches, Nausea , Vomiting , Contractions and Epigastric pain  Fetal Movement : normal  All other systems reviewed and are negative.       The additional following portions of the patient's history were reviewed and updated as appropriate: allergies, current medications, past family history, past medical history, past social history, past surgical history and problem list.    I have reviewed and agree with the HPI, ROS, and historical information as entered above. Darlene Whitney, APRN      EXAM:     Prenatal Vitals  BP: 118/76  Weight: 85.3 kg (188 lb)   Fetal Heart Rate: pos   Fundal Height (cm): 36 cm   Dilation/Effacement/Station  Dilation: Fingertip  Effacement (%): 50  Station: -2      Urine Glucose Read-only: Negative  Urine Protein Read-only: Negative       Assessment and Plan    Problem List Items Addressed This Visit    None  Visit Diagnoses     Encounter for supervision of normal first pregnancy in third trimester    -  Primary    Relevant Orders    POC Protein, Urine, Qualitative, Dipstick (Completed)    POC Glucose, Urine,  Qualitative, Dipstick (Completed)    Group B Streptococcus Culture - Swab, Vaginal/Rectum          1. Pregnancy at 36w1d  2. Fetal status reassuring.   3. Discussed options for IOL. Patient desires spontaneous labor. Would like to postpone an IOL unless medically indicated.   4. Delivery options reviewed with patient  5. Signs of labor reviewed  6. Kick counts reviewed  7. Activity and Exercise discussed.  Return in about 1 week (around 1/5/2023) for [place] BREEEdd Whitney, APRN  12/29/2022

## 2023-01-01 LAB — BACTERIA SPEC AEROBE CULT: NORMAL

## 2023-01-05 ENCOUNTER — ROUTINE PRENATAL (OUTPATIENT)
Dept: OBSTETRICS AND GYNECOLOGY | Facility: CLINIC | Age: 38
End: 2023-01-05
Payer: OTHER GOVERNMENT

## 2023-01-05 VITALS — WEIGHT: 187.4 LBS | SYSTOLIC BLOOD PRESSURE: 120 MMHG | DIASTOLIC BLOOD PRESSURE: 80 MMHG | BODY MASS INDEX: 29.35 KG/M2

## 2023-01-05 DIAGNOSIS — O09.529 ANTEPARTUM MULTIGRAVIDA OF ADVANCED MATERNAL AGE: ICD-10-CM

## 2023-01-05 DIAGNOSIS — Z34.80 SUPERVISION OF OTHER NORMAL PREGNANCY, ANTEPARTUM: Primary | ICD-10-CM

## 2023-01-05 DIAGNOSIS — Z3A.37 37 WEEKS GESTATION OF PREGNANCY: ICD-10-CM

## 2023-01-05 DIAGNOSIS — Z82.79 FAMILY HISTORY OF CONGENITAL HEART DEFECT: ICD-10-CM

## 2023-01-05 LAB
GLUCOSE UR STRIP-MCNC: NEGATIVE MG/DL
PROT UR STRIP-MCNC: NEGATIVE MG/DL

## 2023-01-05 PROCEDURE — 0502F SUBSEQUENT PRENATAL CARE: CPT | Performed by: OBSTETRICS & GYNECOLOGY

## 2023-01-05 NOTE — PROGRESS NOTES
OB FOLLOW UP  CC- Here for care of pregnancy        Natasha Beckett is a 37 y.o.  37w1d patient being seen today for her obstetrical follow up visit. Patient reports mild swelling in hands and feet.  FM monitoring/labor/preeclampsia precautions reviewed.     Her prenatal care is complicated by (and status) :   Patient Active Problem List   Diagnosis   • Pregnancy   • Antepartum multigravida of advanced maternal age   • Family history of congenital heart defect       GBS Status:   Group B Strep Culture   Date Value Ref Range Status   2022 No Group B Streptococcus isolated  Final         No Known Allergies       Flu Status: Declines  Her Delivery Plan is: Does not desire IOL    US today: no  Non Stress Test: No.       ROS -   Patient Denies: Loss of Fluid, Vaginal Spotting, Vision Changes, Headaches, Nausea , Vomiting , Contractions and Epigastric pain  Fetal Movement : normal  All other systems reviewed and are negative.       The additional following portions of the patient's history were reviewed and updated as appropriate: allergies, current medications, past family history, past medical history, past social history, past surgical history and problem list.    I have reviewed and agree with the HPI, ROS, and historical information as entered above. Ani Rosa MD      EXAM:     Prenatal Vitals  BP: 120/80  Weight: 85 kg (187 lb 6.4 oz)   Fetal Heart Rate: +       Dilation/Effacement/Station  Dilation: 2  Effacement (%): 70  Station: -2      Urine Glucose Read-only: Negative  Urine Protein Read-only: Negative       Assessment and Plan    Problem List Items Addressed This Visit     Pregnancy    Overview     Prev  37 wks 5#10oz  EFW 52%ile 32 wks         Antepartum multigravida of advanced maternal age    Overview     CfDNA low risk  PDC negro US         Family history of congenital heart defect    Overview     Fetal echo 24 wks        Other Visit Diagnoses     Supervision of other normal  pregnancy, antepartum    -  Primary    Relevant Orders    POC Urinalysis Dipstick (Completed)          1. Pregnancy at 37w1d  2. Fetal status reassuring.   3. Reviewed Pre-eclampsia signs/symptoms  4. Discussed options for IOL. Patient desires spontaneous labor. Would like to postpone an IOL unless medically indicated.   5. Delivery options reviewed with patient  6. Signs of labor reviewed  7. Kick counts reviewed  8. Activity and Exercise discussed.  Return in about 1 week (around 1/12/2023) for F/U Prenatal.    Ani Rosa MD  01/05/2023

## 2023-01-06 ENCOUNTER — TELEPHONE (OUTPATIENT)
Dept: OBSTETRICS AND GYNECOLOGY | Facility: CLINIC | Age: 38
End: 2023-01-06
Payer: OTHER GOVERNMENT

## 2023-01-06 NOTE — TELEPHONE ENCOUNTER
Advised patient brown discharge/spotting can be completely normal after cervical exam.  P/v/u  She is aware to call with bright red bleeding/lof.

## 2023-01-12 ENCOUNTER — ROUTINE PRENATAL (OUTPATIENT)
Dept: OBSTETRICS AND GYNECOLOGY | Facility: CLINIC | Age: 38
End: 2023-01-12
Payer: OTHER GOVERNMENT

## 2023-01-12 VITALS — WEIGHT: 184 LBS | BODY MASS INDEX: 28.82 KG/M2 | DIASTOLIC BLOOD PRESSURE: 78 MMHG | SYSTOLIC BLOOD PRESSURE: 118 MMHG

## 2023-01-12 DIAGNOSIS — O09.529 ANTEPARTUM MULTIGRAVIDA OF ADVANCED MATERNAL AGE: Primary | ICD-10-CM

## 2023-01-12 DIAGNOSIS — Z82.79 FAMILY HISTORY OF CONGENITAL HEART DEFECT: ICD-10-CM

## 2023-01-12 DIAGNOSIS — Z3A.38 38 WEEKS GESTATION OF PREGNANCY: ICD-10-CM

## 2023-01-12 LAB
GLUCOSE UR STRIP-MCNC: NEGATIVE MG/DL
PROT UR STRIP-MCNC: NEGATIVE MG/DL

## 2023-01-12 PROCEDURE — 0502F SUBSEQUENT PRENATAL CARE: CPT | Performed by: OBSTETRICS & GYNECOLOGY

## 2023-01-12 NOTE — PROGRESS NOTES
OB FOLLOW UP  CC- Here for care of pregnancy        Natasha Beckett is a 37 y.o.  38w1d patient being seen today for her obstetrical follow up visit. Patient reports no complaints. Patient declines bimanual cervical check due to spotting after most recent check.     Her prenatal care is complicated by (and status) : AMA and previous child with autism  Patient Active Problem List   Diagnosis   • Pregnancy   • Antepartum multigravida of advanced maternal age   • Family history of congenital heart defect       GBS Status:   Group B Strep Culture   Date Value Ref Range Status   2022 No Group B Streptococcus isolated  Final         No Known Allergies       Flu Status: Declines  Her Delivery Plan is: Does not desire IOL    US today: no  Non Stress Test: No.       ROS -   Patient Reports : No Problems  Patient Denies: Nausea, Vomiting, Cramping, Contractions, Epigastric pain, Vision changes, LOF, and Headaches.   Fetal Movement : normal  All other systems reviewed and are negative.       The additional following portions of the patient's history were reviewed and updated as appropriate: allergies, current medications, past family history, past medical history, past social history, past surgical history and problem list.    I have reviewed and agree with the HPI, ROS, and historical information as entered above. Ani Rosa MD      EXAM:     Prenatal Vitals  BP: 118/78  Weight: 83.5 kg (184 lb)   Fetal Heart Rate: +              Urine Glucose Read-only: Negative  Urine Protein Read-only: Negative       Assessment and Plan    Problem List Items Addressed This Visit     Pregnancy    Overview     Prev  37 wks 5#10oz  EFW 52%ile 32 wks         Antepartum multigravida of advanced maternal age - Primary    Overview     CfDNA low risk  PDC negro US         Relevant Orders    POC Urinalysis Dipstick (Completed)    Family history of congenital heart defect    Overview     Fetal echo 24 wks             1. Pregnancy at 38w1d  2. Fetal status reassuring.   3. Reviewed Pre-eclampsia signs/symptoms  4. Discussed options for IOL. Patient desires spontaneous labor. Would like to postpone an IOL unless medically indicated.   5. Delivery options reviewed with patient  6. Signs of labor reviewed  7. Kick counts reviewed  8. Activity and Exercise discussed.  Return in about 1 week (around 1/19/2023) for F/U Prenatal.    Ani Rosa MD  01/12/2023

## 2023-01-15 ENCOUNTER — ANESTHESIA (OUTPATIENT)
Dept: LABOR AND DELIVERY | Facility: HOSPITAL | Age: 38
End: 2023-01-15
Payer: OTHER GOVERNMENT

## 2023-01-15 ENCOUNTER — ANESTHESIA EVENT (OUTPATIENT)
Dept: LABOR AND DELIVERY | Facility: HOSPITAL | Age: 38
End: 2023-01-15
Payer: OTHER GOVERNMENT

## 2023-01-15 ENCOUNTER — HOSPITAL ENCOUNTER (INPATIENT)
Facility: HOSPITAL | Age: 38
LOS: 2 days | Discharge: HOME | End: 2023-01-17
Attending: OBSTETRICS & GYNECOLOGY | Admitting: STUDENT IN AN ORGANIZED HEALTH CARE EDUCATION/TRAINING PROGRAM
Payer: OTHER GOVERNMENT

## 2023-01-15 LAB
ABO GROUP BLD: NORMAL
ABO GROUP BLD: NORMAL
BLD GP AB SCN SERPL QL: NEGATIVE
DEPRECATED RDW RBC AUTO: 39.8 FL (ref 37–54)
ERYTHROCYTE [DISTWIDTH] IN BLOOD BY AUTOMATED COUNT: 12.3 % (ref 12.3–15.4)
HCT VFR BLD AUTO: 40.1 % (ref 34–46.6)
HGB BLD-MCNC: 14 G/DL (ref 12–15.9)
MCH RBC QN AUTO: 31.5 PG (ref 26.6–33)
MCHC RBC AUTO-ENTMCNC: 34.9 G/DL (ref 31.5–35.7)
MCV RBC AUTO: 90.1 FL (ref 79–97)
PLATELET # BLD AUTO: 154 10*3/MM3 (ref 140–450)
PMV BLD AUTO: 10.9 FL (ref 6–12)
RBC # BLD AUTO: 4.45 10*6/MM3 (ref 3.77–5.28)
RH BLD: POSITIVE
RH BLD: POSITIVE
T&S EXPIRATION DATE: NORMAL
WBC NRBC COR # BLD: 8.15 10*3/MM3 (ref 3.4–10.8)

## 2023-01-15 PROCEDURE — 25010000002 ONDANSETRON PER 1 MG: Performed by: STUDENT IN AN ORGANIZED HEALTH CARE EDUCATION/TRAINING PROGRAM

## 2023-01-15 PROCEDURE — 86901 BLOOD TYPING SEROLOGIC RH(D): CPT | Performed by: STUDENT IN AN ORGANIZED HEALTH CARE EDUCATION/TRAINING PROGRAM

## 2023-01-15 PROCEDURE — 25010000002 ROPIVACAINE PER 1 MG: Performed by: ANESTHESIOLOGY

## 2023-01-15 PROCEDURE — 51703 INSERT BLADDER CATH COMPLEX: CPT

## 2023-01-15 PROCEDURE — C1755 CATHETER, INTRASPINAL: HCPCS

## 2023-01-15 PROCEDURE — 0KQM0ZZ REPAIR PERINEUM MUSCLE, OPEN APPROACH: ICD-10-PCS | Performed by: STUDENT IN AN ORGANIZED HEALTH CARE EDUCATION/TRAINING PROGRAM

## 2023-01-15 PROCEDURE — 86901 BLOOD TYPING SEROLOGIC RH(D): CPT

## 2023-01-15 PROCEDURE — 86900 BLOOD TYPING SEROLOGIC ABO: CPT

## 2023-01-15 PROCEDURE — 86900 BLOOD TYPING SEROLOGIC ABO: CPT | Performed by: STUDENT IN AN ORGANIZED HEALTH CARE EDUCATION/TRAINING PROGRAM

## 2023-01-15 PROCEDURE — 59400 OBSTETRICAL CARE: CPT | Performed by: STUDENT IN AN ORGANIZED HEALTH CARE EDUCATION/TRAINING PROGRAM

## 2023-01-15 PROCEDURE — 86850 RBC ANTIBODY SCREEN: CPT | Performed by: STUDENT IN AN ORGANIZED HEALTH CARE EDUCATION/TRAINING PROGRAM

## 2023-01-15 PROCEDURE — 59025 FETAL NON-STRESS TEST: CPT

## 2023-01-15 PROCEDURE — C1755 CATHETER, INTRASPINAL: HCPCS | Performed by: ANESTHESIOLOGY

## 2023-01-15 PROCEDURE — 10907ZC DRAINAGE OF AMNIOTIC FLUID, THERAPEUTIC FROM PRODUCTS OF CONCEPTION, VIA NATURAL OR ARTIFICIAL OPENING: ICD-10-PCS | Performed by: STUDENT IN AN ORGANIZED HEALTH CARE EDUCATION/TRAINING PROGRAM

## 2023-01-15 PROCEDURE — 85027 COMPLETE CBC AUTOMATED: CPT | Performed by: STUDENT IN AN ORGANIZED HEALTH CARE EDUCATION/TRAINING PROGRAM

## 2023-01-15 PROCEDURE — 25010000002 FENTANYL CITRATE (PF) 50 MCG/ML SOLUTION: Performed by: ANESTHESIOLOGY

## 2023-01-15 RX ORDER — OXYTOCIN/0.9 % SODIUM CHLORIDE 30/500 ML
999 PLASTIC BAG, INJECTION (ML) INTRAVENOUS ONCE
Status: DISCONTINUED | OUTPATIENT
Start: 2023-01-15 | End: 2023-01-17 | Stop reason: HOSPADM

## 2023-01-15 RX ORDER — SODIUM CHLORIDE 0.9 % (FLUSH) 0.9 %
10 SYRINGE (ML) INJECTION EVERY 12 HOURS SCHEDULED
Status: DISCONTINUED | OUTPATIENT
Start: 2023-01-15 | End: 2023-01-15 | Stop reason: HOSPADM

## 2023-01-15 RX ORDER — MAGNESIUM CARB/ALUMINUM HYDROX 105-160MG
30 TABLET,CHEWABLE ORAL ONCE
Status: DISCONTINUED | OUTPATIENT
Start: 2023-01-15 | End: 2023-01-15 | Stop reason: HOSPADM

## 2023-01-15 RX ORDER — DOCUSATE SODIUM 100 MG/1
100 CAPSULE, LIQUID FILLED ORAL 2 TIMES DAILY
Status: DISCONTINUED | OUTPATIENT
Start: 2023-01-15 | End: 2023-01-17 | Stop reason: HOSPADM

## 2023-01-15 RX ORDER — PROMETHAZINE HYDROCHLORIDE 12.5 MG/1
12.5 TABLET ORAL EVERY 6 HOURS PRN
Status: DISCONTINUED | OUTPATIENT
Start: 2023-01-15 | End: 2023-01-15 | Stop reason: HOSPADM

## 2023-01-15 RX ORDER — BUTORPHANOL TARTRATE 1 MG/ML
1 INJECTION, SOLUTION INTRAMUSCULAR; INTRAVENOUS
Status: DISCONTINUED | OUTPATIENT
Start: 2023-01-15 | End: 2023-01-15 | Stop reason: HOSPADM

## 2023-01-15 RX ORDER — FAMOTIDINE 10 MG/ML
20 INJECTION, SOLUTION INTRAVENOUS ONCE AS NEEDED
Status: DISCONTINUED | OUTPATIENT
Start: 2023-01-15 | End: 2023-01-15 | Stop reason: HOSPADM

## 2023-01-15 RX ORDER — SODIUM CHLORIDE 0.9 % (FLUSH) 0.9 %
1-10 SYRINGE (ML) INJECTION AS NEEDED
Status: DISCONTINUED | OUTPATIENT
Start: 2023-01-15 | End: 2023-01-15 | Stop reason: HOSPADM

## 2023-01-15 RX ORDER — METHYLERGONOVINE MALEATE 0.2 MG/ML
200 INJECTION INTRAVENOUS ONCE AS NEEDED
Status: DISCONTINUED | OUTPATIENT
Start: 2023-01-15 | End: 2023-01-15 | Stop reason: HOSPADM

## 2023-01-15 RX ORDER — ACETAMINOPHEN 325 MG/1
650 TABLET ORAL EVERY 4 HOURS PRN
Status: DISCONTINUED | OUTPATIENT
Start: 2023-01-15 | End: 2023-01-17 | Stop reason: HOSPADM

## 2023-01-15 RX ORDER — DIPHENHYDRAMINE HYDROCHLORIDE 50 MG/ML
12.5 INJECTION INTRAMUSCULAR; INTRAVENOUS EVERY 8 HOURS PRN
Status: DISCONTINUED | OUTPATIENT
Start: 2023-01-15 | End: 2023-01-15 | Stop reason: HOSPADM

## 2023-01-15 RX ORDER — LIDOCAINE HYDROCHLORIDE 10 MG/ML
5 INJECTION, SOLUTION EPIDURAL; INFILTRATION; INTRACAUDAL; PERINEURAL AS NEEDED
Status: DISCONTINUED | OUTPATIENT
Start: 2023-01-15 | End: 2023-01-15 | Stop reason: HOSPADM

## 2023-01-15 RX ORDER — OXYTOCIN/0.9 % SODIUM CHLORIDE 30/500 ML
PLASTIC BAG, INJECTION (ML) INTRAVENOUS
Status: DISCONTINUED
Start: 2023-01-15 | End: 2023-01-17 | Stop reason: HOSPADM

## 2023-01-15 RX ORDER — ONDANSETRON 4 MG/1
4 TABLET, FILM COATED ORAL EVERY 6 HOURS PRN
Status: DISCONTINUED | OUTPATIENT
Start: 2023-01-15 | End: 2023-01-15 | Stop reason: HOSPADM

## 2023-01-15 RX ORDER — PROMETHAZINE HYDROCHLORIDE 12.5 MG/1
12.5 SUPPOSITORY RECTAL EVERY 6 HOURS PRN
Status: DISCONTINUED | OUTPATIENT
Start: 2023-01-15 | End: 2023-01-15 | Stop reason: HOSPADM

## 2023-01-15 RX ORDER — TRISODIUM CITRATE DIHYDRATE AND CITRIC ACID MONOHYDRATE 500; 334 MG/5ML; MG/5ML
30 SOLUTION ORAL ONCE
Status: DISCONTINUED | OUTPATIENT
Start: 2023-01-15 | End: 2023-01-15 | Stop reason: HOSPADM

## 2023-01-15 RX ORDER — OXYTOCIN/0.9 % SODIUM CHLORIDE 30/500 ML
250 PLASTIC BAG, INJECTION (ML) INTRAVENOUS CONTINUOUS
Status: ACTIVE | OUTPATIENT
Start: 2023-01-15 | End: 2023-01-15

## 2023-01-15 RX ORDER — ONDANSETRON 4 MG/1
4 TABLET, FILM COATED ORAL EVERY 8 HOURS PRN
Status: DISCONTINUED | OUTPATIENT
Start: 2023-01-15 | End: 2023-01-17 | Stop reason: HOSPADM

## 2023-01-15 RX ORDER — MORPHINE SULFATE 2 MG/ML
2 INJECTION, SOLUTION INTRAMUSCULAR; INTRAVENOUS
Status: DISCONTINUED | OUTPATIENT
Start: 2023-01-15 | End: 2023-01-15 | Stop reason: HOSPADM

## 2023-01-15 RX ORDER — MISOPROSTOL 200 UG/1
800 TABLET ORAL AS NEEDED
Status: DISCONTINUED | OUTPATIENT
Start: 2023-01-15 | End: 2023-01-15 | Stop reason: HOSPADM

## 2023-01-15 RX ORDER — OXYCODONE HYDROCHLORIDE AND ACETAMINOPHEN 5; 325 MG/1; MG/1
1 TABLET ORAL EVERY 4 HOURS PRN
Status: DISCONTINUED | OUTPATIENT
Start: 2023-01-15 | End: 2023-01-15 | Stop reason: HOSPADM

## 2023-01-15 RX ORDER — DIPHENHYDRAMINE HCL 25 MG
25 CAPSULE ORAL NIGHTLY PRN
Status: DISCONTINUED | OUTPATIENT
Start: 2023-01-15 | End: 2023-01-17 | Stop reason: HOSPADM

## 2023-01-15 RX ORDER — EPHEDRINE SULFATE 5 MG/ML
10 INJECTION INTRAVENOUS
Status: DISCONTINUED | OUTPATIENT
Start: 2023-01-15 | End: 2023-01-15 | Stop reason: HOSPADM

## 2023-01-15 RX ORDER — FENTANYL CITRATE 50 UG/ML
INJECTION, SOLUTION INTRAMUSCULAR; INTRAVENOUS AS NEEDED
Status: DISCONTINUED | OUTPATIENT
Start: 2023-01-15 | End: 2023-01-15 | Stop reason: SURG

## 2023-01-15 RX ORDER — ONDANSETRON 2 MG/ML
4 INJECTION INTRAMUSCULAR; INTRAVENOUS ONCE AS NEEDED
Status: DISCONTINUED | OUTPATIENT
Start: 2023-01-15 | End: 2023-01-15 | Stop reason: HOSPADM

## 2023-01-15 RX ORDER — MORPHINE SULFATE 2 MG/ML
2 INJECTION, SOLUTION INTRAMUSCULAR; INTRAVENOUS
Status: DISCONTINUED | OUTPATIENT
Start: 2023-01-15 | End: 2023-01-15 | Stop reason: SDUPTHER

## 2023-01-15 RX ORDER — BUPIVACAINE HYDROCHLORIDE 2.5 MG/ML
INJECTION, SOLUTION EPIDURAL; INFILTRATION; INTRACAUDAL AS NEEDED
Status: DISCONTINUED | OUTPATIENT
Start: 2023-01-15 | End: 2023-01-15 | Stop reason: SURG

## 2023-01-15 RX ORDER — BISACODYL 10 MG
10 SUPPOSITORY, RECTAL RECTAL DAILY PRN
Status: DISCONTINUED | OUTPATIENT
Start: 2023-01-16 | End: 2023-01-17 | Stop reason: HOSPADM

## 2023-01-15 RX ORDER — SODIUM CHLORIDE 0.9 % (FLUSH) 0.9 %
1-10 SYRINGE (ML) INJECTION AS NEEDED
Status: DISCONTINUED | OUTPATIENT
Start: 2023-01-15 | End: 2023-01-17 | Stop reason: HOSPADM

## 2023-01-15 RX ORDER — HYDROCORTISONE 25 MG/G
1 CREAM TOPICAL AS NEEDED
Status: DISCONTINUED | OUTPATIENT
Start: 2023-01-15 | End: 2023-01-17 | Stop reason: HOSPADM

## 2023-01-15 RX ORDER — IBUPROFEN 600 MG/1
600 TABLET ORAL EVERY 6 HOURS PRN
Status: DISCONTINUED | OUTPATIENT
Start: 2023-01-15 | End: 2023-01-17 | Stop reason: HOSPADM

## 2023-01-15 RX ORDER — LIDOCAINE HYDROCHLORIDE AND EPINEPHRINE 15; 5 MG/ML; UG/ML
INJECTION, SOLUTION EPIDURAL AS NEEDED
Status: DISCONTINUED | OUTPATIENT
Start: 2023-01-15 | End: 2023-01-15 | Stop reason: SURG

## 2023-01-15 RX ORDER — ONDANSETRON 2 MG/ML
4 INJECTION INTRAMUSCULAR; INTRAVENOUS EVERY 6 HOURS PRN
Status: DISCONTINUED | OUTPATIENT
Start: 2023-01-15 | End: 2023-01-15 | Stop reason: HOSPADM

## 2023-01-15 RX ORDER — CARBOPROST TROMETHAMINE 250 UG/ML
250 INJECTION, SOLUTION INTRAMUSCULAR AS NEEDED
Status: DISCONTINUED | OUTPATIENT
Start: 2023-01-15 | End: 2023-01-15 | Stop reason: HOSPADM

## 2023-01-15 RX ORDER — IBUPROFEN 600 MG/1
600 TABLET ORAL EVERY 6 HOURS PRN
Status: DISCONTINUED | OUTPATIENT
Start: 2023-01-15 | End: 2023-01-15 | Stop reason: HOSPADM

## 2023-01-15 RX ORDER — ROPIVACAINE HYDROCHLORIDE 2 MG/ML
15 INJECTION, SOLUTION EPIDURAL; INFILTRATION; PERINEURAL CONTINUOUS
Status: DISCONTINUED | OUTPATIENT
Start: 2023-01-15 | End: 2023-01-15

## 2023-01-15 RX ORDER — SODIUM CHLORIDE, SODIUM LACTATE, POTASSIUM CHLORIDE, CALCIUM CHLORIDE 600; 310; 30; 20 MG/100ML; MG/100ML; MG/100ML; MG/100ML
125 INJECTION, SOLUTION INTRAVENOUS CONTINUOUS
Status: DISCONTINUED | OUTPATIENT
Start: 2023-01-15 | End: 2023-01-15

## 2023-01-15 RX ADMIN — IBUPROFEN 600 MG: 600 TABLET ORAL at 22:25

## 2023-01-15 RX ADMIN — WITCH HAZEL 1 PAD: 500 SOLUTION RECTAL; TOPICAL at 17:59

## 2023-01-15 RX ADMIN — ROPIVACAINE HYDROCHLORIDE 15 ML/HR: 2 INJECTION, SOLUTION EPIDURAL; INFILTRATION at 12:16

## 2023-01-15 RX ADMIN — Medication 1 APPLICATION: at 17:59

## 2023-01-15 RX ADMIN — ONDANSETRON 4 MG: 2 INJECTION INTRAMUSCULAR; INTRAVENOUS at 14:49

## 2023-01-15 RX ADMIN — LIDOCAINE HYDROCHLORIDE AND EPINEPHRINE 2 ML: 15; 5 INJECTION, SOLUTION EPIDURAL at 12:10

## 2023-01-15 RX ADMIN — FENTANYL CITRATE 100 MCG: 50 INJECTION, SOLUTION INTRAMUSCULAR; INTRAVENOUS at 12:11

## 2023-01-15 RX ADMIN — Medication: at 17:59

## 2023-01-15 RX ADMIN — BUPIVACAINE HYDROCHLORIDE 12 ML: 2.5 INJECTION, SOLUTION EPIDURAL; INFILTRATION; INTRACAUDAL; PERINEURAL at 12:12

## 2023-01-15 RX ADMIN — LIDOCAINE HYDROCHLORIDE AND EPINEPHRINE 3 ML: 15; 5 INJECTION, SOLUTION EPIDURAL at 12:09

## 2023-01-15 NOTE — H&P
"[facility]  Natasha Beckett  : 1985  MRN: [id number]  CSN: [id number]    History and Physical    Subjective   Chief Complaint   Patient presents with   • Laboring     Natasha Beckett is a 37 y.o. year old  with an Estimated Date of Delivery: 23 currently at 38w4d presenting with regular contractions.  She also reports normal fetal movement, no leaking and no vaginal bleeding.    Prenatal care has been with Dr. Rosa.  It has been complicated by AMA (genetic screening was normal).    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        Objective   Ht 170.2 cm (67\")   Wt 84.4 kg (186 lb)   LMP 2022   Breastfeeding Yes   BMI 29.13 kg/m²   General: well developed; well nourished  no acute distress   Heart: Not performed.   Lungs: breathing is unlabored   Abdomen: soft, non-tender; no masses   FHT's: reassuring and category 1   Cervix: was checked (by RN): 5-6 cm / 100 % with BBOW   Presentation: To be checked on admit, cephalic on last TAUS    Contractions: regular     Prenatal " Labs  Lab Results   Component Value Date    HGB 11.9 (L) 11/10/2022    RUBELLAABIGG 0.98 (L) 06/23/2022    HEPBSAG Negative 06/23/2022    ABORH A Rh Positive 10/04/2015    ABSCRN Negative 11/10/2022    JTR8DEV3 Non Reactive 06/23/2022    HEPCVIRUSABY <0.1 06/23/2022     11/10/2022    STREPGPB No Group B Streptococcus isolated 12/29/2022    URINECX Final report 10/13/2022    CHLAMNAA Negative 06/23/2022    NGONORRHON Negative 06/23/2022       Current Labs Reviewed   CBC       Assessment   1. IUP at 38w4d  2. Group B strep status: negative  3. Labor      Plan   1. Admit for labor and epidural per patient request   2. AROM when appropriate   3. Anticipate vaginal delivery     Georgina Umana MD  1/15/2023  11:57 EST

## 2023-01-15 NOTE — L&D DELIVERY NOTE
[facility]   Vaginal Delivery Note    Patient Name: Natasha Beckett  : 1985  MRN: [id number]    Date of Delivery: 1/15/2023     Diagnosis     Pre & Post-Delivery:  Intrauterine pregnancy at 38w4d  Labor status: Spontaneous Onset of Labor     Postpartum care following vaginal delivery 1/15 (O[place])    Pregnancy             Problem List    Transfer to Postpartum     Review the Delivery Report for details.     Delivery     Delivery: Vaginal, Spontaneous     YOB: 2023    Time of Birth:  Gestational Age 3:26 PM   38w4d     Anesthesia: Epidural     Delivering clinician: Georgina Umana    Forceps?   No   Vacuum? No    Shoulder dystocia present: No       Delivery narrative:  Viable male infant delivered OA over intact perineum after small, median episiotomy cut to allow room for fetal head. Anterior shoulder delivered easily followed by posterior shoulder and remainder of infant body. Infant placed on maternal abdomen, bulb suctioned and dried. Umbilical cord clamped x2 and cut after 60 second delay. Placenta then delivered spontaneously with gentle traction, intact with 3VC. Fundus then firm. Counts correct. EBL 200ml.     Patient supplied personal cord blood and tissue sample kit for cord blood storage. SAMANTHA Hopper assisted in collection per kit instructions.       Infant     Findings: male  infant     Infant observations: Weight: 3190 g (7 lb 0.5 oz)   Length: 19.5  in  Observations/Comments:        Apgars: 9  @ 1 minute /    9  @ 5 minutes   Infant Name: Otis     Placenta & Cord         Placenta delivered  Spontaneous  at   1/15/2023  3:29 PM     Cord: 3 vessels  present.   Nuchal Cord?  no   Cord blood obtained: Yes    Cord gases obtained:  No    Cord gas results: Venous:  No results found for: PHCVEN    Arterial:  No results found for: PHCART     Repair      Episiotomy: Median     Yes  Suture used for repair: 2-0 Vicryl    Lacerations: Yes  Laceration  Information  Laceration Repaired?   Perineal: 2nd  Yes    Periurethral:       Labial:       Sulcus:       Vaginal:       Cervical:         Suture used for repair: 2-0 Vicryl   Estimated Blood Loss:  200cc     Quantitative Blood Loss:          Complications     none    Disposition     Mother to Mother Baby/Postpartum  in stable condition currently.  Baby to remains with mom  in stable condition currently.    Georgina Umana MD  01/15/23  15:53 EST

## 2023-01-15 NOTE — LACTATION NOTE
01/15/23 1810   Maternal Information   Person Making Referral lactation consultant  (courtesy consult)   Maternal Reason for Referral   ( 1st x 1 year--good supply)   Infant Reason for Referral   (reports baby  in labor and delivery)   Milk Expression/Equipment   Breast Pump Type double electric, personal  (mom has personal pump at home)   Breast Pumping   Breast Pumping Interventions   (can pump for missed breastfeedings--can get pump from home if needed)     Teaching done, as documented under Education. To call lactation services, if there are questions or concerns or if mom wants help with a feeding. Encouraged skin to skin.

## 2023-01-15 NOTE — ANESTHESIA PREPROCEDURE EVALUATION
Anesthesia Evaluation     Patient summary reviewed and Nursing notes reviewed   NPO Solid Status: > 4 hours  NPO Liquid Status: < 2 hours           Airway   Mallampati: I  TM distance: >3 FB  Neck ROM: full  No difficulty expected  Dental      Pulmonary - negative pulmonary ROS   Cardiovascular - negative cardio ROS        Neuro/Psych- negative ROS  GI/Hepatic/Renal/Endo    (+)   renal disease stones,     Musculoskeletal (-) negative ROS    Abdominal    Substance History - negative use     OB/GYN    (+) Pregnant,         Other                        Anesthesia Plan    ASA 2     epidural       Anesthetic plan, risks, benefits, and alternatives have been provided, discussed and informed consent has been obtained with: patient.        CODE STATUS:    Code Status (Patient has no pulse and is not breathing): CPR (Attempt to Resuscitate)  Medical Interventions (Patient has pulse or is breathing): Full Support

## 2023-01-15 NOTE — ANESTHESIA PROCEDURE NOTES
Labor Epidural      Patient reassessed immediately prior to procedure    Patient location during procedure: OB  Performed By  Anesthesiologist: Slava Stevenson DO  Preanesthetic Checklist  Completed: patient identified, IV checked, site marked, risks and benefits discussed, surgical consent, monitors and equipment checked, pre-op evaluation and timeout performed  Additional Notes  Dural puncture performed with a 5 inch 25 g Tami needle, clear CSF.  Prep:  Pt Position:sitting  Sterile Tech:gloves, mask, sterile barrier and cap  Prep:chlorhexidine gluconate and isopropyl alcohol  Monitoring:blood pressure monitoring and continuous pulse oximetry  Epidural Block Procedure:  Approach:midline  Guidance:landmark technique and palpation technique  Location:L3-L4  Needle Type:Tuohy  Needle Gauge:17 G  Loss of Resistance Medium: air  Loss of Resistance: 5cm  Cath Depth at skin:10 cm  Paresthesia: none  Aspiration:negative  Test Dose:negative  Number of Attempts: 1  Post Assessment:  Dressing:secured with tape and occlusive dressing applied (Tegaderm Placed)  Pt Tolerance:patient tolerated the procedure well with no apparent complications  Complications:no

## 2023-01-16 LAB
HCT VFR BLD AUTO: 34.1 % (ref 34–46.6)
HGB BLD-MCNC: 11.6 G/DL (ref 12–15.9)

## 2023-01-16 PROCEDURE — 85014 HEMATOCRIT: CPT | Performed by: STUDENT IN AN ORGANIZED HEALTH CARE EDUCATION/TRAINING PROGRAM

## 2023-01-16 PROCEDURE — 85018 HEMOGLOBIN: CPT | Performed by: STUDENT IN AN ORGANIZED HEALTH CARE EDUCATION/TRAINING PROGRAM

## 2023-01-16 PROCEDURE — 0503F POSTPARTUM CARE VISIT: CPT | Performed by: NURSE PRACTITIONER

## 2023-01-16 RX ADMIN — DOCUSATE SODIUM 100 MG: 100 CAPSULE, LIQUID FILLED ORAL at 20:18

## 2023-01-16 RX ADMIN — IBUPROFEN 600 MG: 600 TABLET ORAL at 08:47

## 2023-01-16 RX ADMIN — IBUPROFEN 600 MG: 600 TABLET ORAL at 20:18

## 2023-01-16 RX ADMIN — DOCUSATE SODIUM 100 MG: 100 CAPSULE, LIQUID FILLED ORAL at 08:47

## 2023-01-16 NOTE — PROGRESS NOTES
Postpartum Progress Note    Patient name: Natasha Beckett  YOB: 1985   MRN: [id number]  Referring Provider: Ani Rosa MD  Admission Date: 1/15/2023  Date of Service: 2023    ID: 37 y.o.     Diagnosis:   S/p vaginal delivery   Patient Active Problem List   Diagnosis   • Pregnancy   • Antepartum multigravida of advanced maternal age   • Family history of congenital heart defect   • Postpartum care following vaginal delivery 1/15 ([place])       Subjective:      No complaints.  Moderate lochia.  Ambulating, voiding, tolerating diet.  Pain well controlled.  The patient is currently breastfeeding.   This baby is a male. Parents desire circumcision.    Objective:      Vital signs:  Vital Signs Range for the last 24 hours  Temperature: Temp:  [97.6 °F (36.4 °C)-98.8 °F (37.1 °C)] 97.7 °F (36.5 °C)   Temp Source: Temp src: Oral   BP: BP: (106-133)/(59-87) 114/64   Pulse: Heart Rate:  [] 83   Respirations: Resp:  [16-18] 16   Weight: 84.4 kg (186 lb)     General: Alert & oriented x4, in no apparent distress  Abdomen: soft, nontender  Uterus: firm, nontender  Extremities: nontender; no edema      Labs:  Lab Results   Component Value Date    WBC 8.15 01/15/2023    HGB 14.0 01/15/2023    HCT 40.1 01/15/2023    MCV 90.1 01/15/2023     01/15/2023     Results from last 7 days   Lab Units 01/15/23  1307   ABO TYPING  A   RH TYPING  Positive     External Prenatal Results     Pregnancy Outside Results - Transcribed From Office Records - See Scanned Records For Details     Test Value Date Time    ABO  A  01/15/23 1307    Rh  Positive  01/15/23 1307    Antibody Screen  Negative  01/15/23 1307       Negative  11/10/22 0942       Negative  22 1004    Varicella IgG       Rubella  0.98 index 22 1004    Hgb  14.0 g/dL 01/15/23 1135       11.9 g/dL 11/10/22 0942       12.3 g/dL 10/11/22 1915       13.3 g/dL 22 1004    Hct  40.1 % 01/15/23 1135       35.6 % 11/10/22 0966        34.5 % 10/11/22 1915       38.8 % 06/23/22 1004    Glucose Fasting GTT       Glucose Tolerance Test 1 hour       Glucose Tolerance Test 3 hour       Gonorrhea (discrete)  Negative  06/23/22 1004    Chlamydia (discrete)  Negative  06/23/22 1004    RPR  Non Reactive  06/23/22 1004    VDRL       Syphilis Antibody       HBsAg  Negative  06/23/22 1004    Herpes Simplex Virus PCR       Herpes Simplex VIrus Culture       HIV  Non Reactive  06/23/22 1004    Hep C RNA Quant PCR       Hep C Antibody  <0.1 s/co ratio 06/23/22 1004    AFP  30.1 ng/mL 08/18/22 1056    Group B Strep  No Group B Streptococcus isolated  12/29/22 1812    GBS Susceptibility to Clindamycin       GBS Susceptibility to Erythromycin       Fetal Fibronectin       Genetic Testing, Maternal Blood             Drug Screening     Test Value Date Time    Urine Drug Screen       Amphetamine Screen  Negative ng/mL 06/23/22 1004    Barbiturate Screen  Negative ng/mL 06/23/22 1004    Benzodiazepine Screen  Negative ng/mL 06/23/22 1004    Methadone Screen  Negative ng/mL 06/23/22 1004    Phencyclidine Screen  Negative ng/mL 06/23/22 1004    Opiates Screen       THC Screen       Cocaine Screen       Propoxyphene Screen  Negative ng/mL 06/23/22 1004    Buprenorphine Screen       Methamphetamine Screen       Oxycodone Screen       Tricyclic Antidepressants Screen             Legend    ^: Historical                        Assessment/Plan:      PPD#1 s/p vaginal delivery.  1. S/p vaginal delivery: Doing well.Continue routine postpartum care. A.m. labs pending.  2. Infant feeding: Supportive care.  The patient is currently breastfeeding. Male infant. Parents desire circumcision.

## 2023-01-16 NOTE — LACTATION NOTE
01/16/23 1000   Maternal Information   Date of Referral 01/16/23   Person Making Referral lactation consultant   Maternal Reason for Referral breastfeeding currently  (Assisted mom with obtaining a deeper latch.  Positioned and latched in CC on left breast.  Nipples tender but intact.  Small bruise to right nipple.  Mom states new latch is more comfortable and demonstrated deep latching by herself.  Audible swallowing.)   Infant Reason for Referral other (see comments)  (Questions related to pumping and bottle feeding answered.)   Maternal Assessment   Breast Size Issue none   Breast Shape Bilateral:;round   Breast Density Bilateral:;soft   Nipples everted;Right:   Left Nipple Symptoms intact;tender   Right Nipple Symptoms bruised;tender   Maternal Infant Feeding   Maternal Emotional State relaxed;receptive   Infant Positioning cross-cradle   Signs of Milk Transfer audible swallow;deep jaw excursions noted   Pain with Feeding no   Comfort Measures Before/During Feeding infant position adjusted;latch adjusted;maternal position adjusted   Nipple Shape After Feeding, Left Breast round;symmetrical;appropriately projected   Latch Assistance minimal assistance;verbal guidance offered   Support Person Involvement actively supporting mother;verbally supports mother   Milk Expression/Equipment   Breast Pump Type double electric, personal

## 2023-01-16 NOTE — ANESTHESIA POSTPROCEDURE EVALUATION
Patient: Natasha Beckett    Procedure Summary     Date: 01/15/23 Room / Location:     Anesthesia Start: 1159 Anesthesia Stop: 1529    Procedure: LABOR ANALGESIA Diagnosis:     Scheduled Providers:  Provider: Slava Stevenson DO    Anesthesia Type: epidural ASA Status: 2          Anesthesia Type: epidural    Vitals  Vitals Value Taken Time   /64 01/16/23 0729   Temp 97.7 °F (36.5 °C) 01/16/23 0729   Pulse 83 01/16/23 0729   Resp 16 01/16/23 0729   SpO2 100 % 01/15/23 1211           Post Anesthesia Care and Evaluation    Patient location during evaluation: bedside  Patient participation: complete - patient participated  Level of consciousness: awake and alert  Pain management: adequate    Airway patency: patent  Anesthetic complications: No anesthetic complications    Cardiovascular status: acceptable  Respiratory status: acceptable  Hydration status: acceptable  Post Neuraxial Block status: Motor and sensory function returned to baseline and No signs or symptoms of PDPH

## 2023-01-17 VITALS
DIASTOLIC BLOOD PRESSURE: 74 MMHG | RESPIRATION RATE: 16 BRPM | WEIGHT: 186 LBS | TEMPERATURE: 98.7 F | HEART RATE: 83 BPM | SYSTOLIC BLOOD PRESSURE: 118 MMHG | OXYGEN SATURATION: 100 % | BODY MASS INDEX: 29.19 KG/M2 | HEIGHT: 67 IN

## 2023-01-17 PROBLEM — Z34.90 PREGNANCY: Status: RESOLVED | Noted: 2022-06-23 | Resolved: 2023-01-17

## 2023-01-17 PROCEDURE — 0503F POSTPARTUM CARE VISIT: CPT

## 2023-01-17 RX ORDER — IBUPROFEN 600 MG/1
600 TABLET ORAL EVERY 6 HOURS PRN
Qty: 60 TABLET | Refills: 1 | Status: SHIPPED | OUTPATIENT
Start: 2023-01-17

## 2023-01-17 RX ADMIN — IBUPROFEN 600 MG: 600 TABLET ORAL at 09:55

## 2023-01-17 RX ADMIN — DOCUSATE SODIUM 100 MG: 100 CAPSULE, LIQUID FILLED ORAL at 09:55

## 2023-01-17 NOTE — DISCHARGE SUMMARY
1/17/2023  PPD #2    Subjective   Natasha feels well.  Patient describes her bleeding as thin lochia.   Pain is well controlled  Breastfeeding: infant latching without difficulty.     Objective   Temp: Temp:  [97.8 °F (36.6 °C)-98.7 °F (37.1 °C)] 98.7 °F (37.1 °C) Temp src: Oral   BP: BP: (116-119)/(68-81) 118/74        Pulse: Heart Rate:  [76-83] 83  RR: Resp:  [16] 16    General:  well developed; well nourished  no acute distress   Abdomen: Not performed.   Pelvis: Not performed.     Lab Results   Component Value Date    WBC 8.15 01/15/2023    HGB 11.6 (L) 01/16/2023    HCT 34.1 01/16/2023    MCV 90.1 01/15/2023     01/15/2023    ABORH A Rh Positive 10/04/2015    HEPBSAG Negative 06/23/2022       Assessment  1. PPD# 2 after vaginal delivery  2. Infant male@ doing well. Breastfeeding. Desires circumcision prior to discharge, Dr. Rosa made aware.  3. Patient refuses MMR vaccine, Rubella non-immune.    Plan  1. Discharge to home  2. Follow up with Ani Rosa MD  in 6 weeks  3. Prescriptions for Motrin.  4. Advised no tampons, intercourse, or tub baths for 6 weeks.       This note has been electronically signed.    Sarah Valle, APRN  January 17, 2023

## 2023-03-02 ENCOUNTER — POSTPARTUM VISIT (OUTPATIENT)
Dept: OBSTETRICS AND GYNECOLOGY | Facility: CLINIC | Age: 38
End: 2023-03-02
Payer: OTHER GOVERNMENT

## 2023-03-02 VITALS
SYSTOLIC BLOOD PRESSURE: 116 MMHG | HEIGHT: 67 IN | BODY MASS INDEX: 27.28 KG/M2 | DIASTOLIC BLOOD PRESSURE: 68 MMHG | WEIGHT: 173.8 LBS

## 2023-03-02 PROBLEM — Z82.79 FAMILY HISTORY OF CONGENITAL HEART DEFECT: Status: RESOLVED | Noted: 2022-09-08 | Resolved: 2023-03-02

## 2023-03-02 PROBLEM — O09.529 ANTEPARTUM MULTIGRAVIDA OF ADVANCED MATERNAL AGE: Status: RESOLVED | Noted: 2022-06-23 | Resolved: 2023-03-02

## 2023-03-02 PROCEDURE — 0503F POSTPARTUM CARE VISIT: CPT | Performed by: OBSTETRICS & GYNECOLOGY

## 2023-03-02 RX ORDER — DROSPIRENONE 4 MG/1
1 TABLET, FILM COATED ORAL DAILY
Qty: 84 TABLET | Refills: 3 | Status: SHIPPED | OUTPATIENT
Start: 2023-03-02

## 2023-03-02 NOTE — PROGRESS NOTES
Answers for HPI/ROS submitted by the patient on 3/1/2023  Please describe your symptoms.: None  Have you had these symptoms before?: No  How long have you been having these symptoms?: 1-4 days  Please list any medications you are currently taking for this condition.: Na  Please describe any probable cause for these symptoms. : Na  What is the primary reason for your visit?: Other            Chief Complaint   Patient presents with   • Postpartum Care       6 Week Postpartum Visit         Natasha Beckett is a 38 y.o.  who presents today for a 6 week postpartum check.     C/S: no     Vaginal, Spontaneous    Information for the patient's :  Otis Beckett [[id number]]   1/15/2023   male   Otis Beckett   3190 g (7 lb 0.5 oz)   Gestational Age: 38w4d          Baby Discharged: Discharged with Mom  Delivering Physician: Laborist     At the time of delivery were you diagnosed with any of the following: None. The laceration was 2nd degree and is healing well. Patient describes vaginal bleeding as absent.  Patient is breast feeding.  She desires contraceptive methods: None for contraception.  Patient denies bowel or bladder issues.      Patient denies postpartum depression. Postpartum Depression Screening Questionnaire: 2, no treatment indicated.      Last Pap : 2021. Results: negative. HPV: negative.   Last Completed Pap Smear          PAP SMEAR (Every 3 Years) Next due on 2024  SCANNED - PAP SMEAR    09/10/2020  SCANNED - PAP SMEAR    08/15/2019  Done - negative    10/01/2017  Done                  The additional following portions of the patient's history were reviewed and updated as appropriate: allergies, current medications, past family history, past medical history, past social history, past surgical history and problem list.    Review of Systems   Constitutional: Negative.    HENT: Negative.    Eyes: Negative.    Respiratory: Negative.   "  Cardiovascular: Negative.    Gastrointestinal: Negative.    Endocrine: Negative.    Genitourinary: Negative.    Musculoskeletal: Negative.    Skin: Negative.    Allergic/Immunologic: Negative.    Neurological: Negative.    Hematological: Negative.    Psychiatric/Behavioral: Negative.      All other systems reviewed and are negative.     I have reviewed and agree with the HPI, ROS, and historical information as entered above. Ani Rosa MD    /68   Ht 170.2 cm (67\")   Wt 78.8 kg (173 lb 12.8 oz)   LMP 04/20/2022   Breastfeeding Yes   BMI 27.22 kg/m²     Physical Exam  Vitals and nursing note reviewed. Exam conducted with a chaperone present.   Constitutional:       Appearance: She is well-developed.   HENT:      Head: Normocephalic and atraumatic.   Neck:      Thyroid: No thyroid mass or thyromegaly.   Pulmonary:      Breath sounds: No rhonchi.   Abdominal:      Palpations: Abdomen is soft. Abdomen is not rigid. There is no mass.      Tenderness: There is no abdominal tenderness. There is no guarding.      Hernia: No hernia is present.   Genitourinary:     Vagina: Normal.      Cervix: Normal.      Uterus: Normal.    Musculoskeletal:      Cervical back: Normal range of motion. No muscular tenderness.   Neurological:      Mental Status: She is alert and oriented to person, place, and time.   Psychiatric:         Behavior: Behavior normal.             Assessment and Plan    Problem List Items Addressed This Visit     Postpartum care following vaginal delivery   Other Visit Diagnoses     Postpartum exam    -  Primary          1. S/p Vaginal delivery, 6 weeks postpartum.  Doing well.    2. Return to normal physical activity.  No pelvic restrictions.   3. Baby doing well.  4. Breastfeeding going well.  5. No si/sx of postpartum depression  6. Contraception: contraceptive methods: Oral progesterone-only contraceptive  7. Return for Annual physical.     Ani Rosa MD  03/02/2023  "

## 2023-04-12 ENCOUNTER — TELEPHONE (OUTPATIENT)
Dept: OBSTETRICS AND GYNECOLOGY | Facility: CLINIC | Age: 38
End: 2023-04-12
Payer: OTHER GOVERNMENT

## 2023-04-12 NOTE — TELEPHONE ENCOUNTER
She wants to f/u with Dr. Rosa about the Slynd coupon process at her pharmacy [facility]. She said it works well and there is a cash price of 60 for 3 months if not covered but insurance and she would like to talk to the drug Rep for Slynd about trying to offering a cheaper cash price. I will talk to Dr. Rosa tomorrow and call her with Aaron's name and phone number.

## 2023-04-12 NOTE — TELEPHONE ENCOUNTER
PT calling regarding Rafaeld, wanting info for drug rep. 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

## 2024-07-01 ENCOUNTER — TELEPHONE (OUTPATIENT)
Dept: OBSTETRICS AND GYNECOLOGY | Facility: CLINIC | Age: 39
End: 2024-07-01
Payer: OTHER GOVERNMENT

## 2024-07-01 NOTE — TELEPHONE ENCOUNTER
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

## 2024-07-25 ENCOUNTER — OFFICE VISIT (OUTPATIENT)
Dept: OBSTETRICS AND GYNECOLOGY | Facility: CLINIC | Age: 39
End: 2024-07-25
Payer: OTHER GOVERNMENT

## 2024-07-25 VITALS
SYSTOLIC BLOOD PRESSURE: 108 MMHG | DIASTOLIC BLOOD PRESSURE: 68 MMHG | BODY MASS INDEX: 24.17 KG/M2 | WEIGHT: 154 LBS | HEIGHT: 67 IN

## 2024-07-25 DIAGNOSIS — O20.0 THREATENED ABORTION: Primary | ICD-10-CM

## 2024-07-25 NOTE — PROGRESS NOTES
"    Chief Complaint   Patient presents with    Threatened Miscarriage          HPI  Natasha Beckett is a 39 y.o. female, , who presents with  threatened miscarriage .    Recent Tests:  She had a urine pregnancy test that was done 2024 that was positive.    US today: Yes.  Findings showed single IUP measuring 6w4d. , with irregular heart rate per ultrasonographer. Small SABRINA noted.  Ultrasound pending physician review.    She has not had prenatal care.  She denies associated abdominal or pelvic pain. Her past medical history is non-contributory.  She does not have any bleeding or passage of tissue.  Rh Status: Positive  She reports no additional symptoms or complaints.    The additional following portions of the patient's history were reviewed and updated as appropriate: allergies, current medications, past medical history, past social history, past surgical history, and problem list.    Review of Systems   Constitutional: Negative.    HENT: Negative.     Eyes: Negative.    Respiratory: Negative.     Cardiovascular: Negative.    Gastrointestinal: Negative.    Endocrine: Negative.    Genitourinary: Negative.    Musculoskeletal: Negative.    Skin: Negative.    Allergic/Immunologic: Negative.    Neurological: Negative.    Hematological: Negative.    Psychiatric/Behavioral: Negative.       All other systems reviewed and are negative.     I have reviewed and agree with the HPI, ROS, and historical information as entered above. Mojgan Burt, AUDRA      Objective   /68   Ht 170.2 cm (67\")   Wt 69.9 kg (154 lb)   LMP 2024 (Exact Date)   BMI 24.12 kg/m²     Physical Exam  Vitals and nursing note reviewed.   Constitutional:       Appearance: Normal appearance. She is normal weight.   Pulmonary:      Effort: Pulmonary effort is normal.   Musculoskeletal:         General: Normal range of motion.   Neurological:      Mental Status: She is alert and oriented to person, place, and time. "            Assessment and Plan    Problem List Items Addressed This Visit    None  Visit Diagnoses       Threatened     -  Primary    Relevant Orders    US Ob Transvaginal            Threatened AB vs early pregnancy.   Repeat U/S in 1 week(s).  Pelvic Rest.  No douching, intercourse or use of tampons.  Return in about 1 week (around 2024) for ultrasound, PREETI Rosa.    Counseling was given to patient for the following topics: diagnostic results, risk factor reductions, prognosis, patient and family education, and impressions . Total time of the encounter was 20 minutes and 10 minutes was spend counseling.      Mojgan Burt, AUDRA  2024

## 2024-08-01 ENCOUNTER — OFFICE VISIT (OUTPATIENT)
Dept: OBSTETRICS AND GYNECOLOGY | Facility: CLINIC | Age: 39
End: 2024-08-01
Payer: OTHER GOVERNMENT

## 2024-08-01 VITALS
SYSTOLIC BLOOD PRESSURE: 118 MMHG | WEIGHT: 154 LBS | HEIGHT: 67 IN | DIASTOLIC BLOOD PRESSURE: 80 MMHG | BODY MASS INDEX: 24.17 KG/M2

## 2024-08-01 DIAGNOSIS — O02.1 MISSED ABORTION: Primary | ICD-10-CM

## 2024-08-01 NOTE — PROGRESS NOTES
"    Chief Complaint   Patient presents with    Threatened Miscarriage          HPI  Natasha Beckett is a 39 y.o. female, , who presents for one week US FU for threatened miscarriage.  She was seen one week ago when US showed a GS 2-3 wks behind LMP and heart rate of 103 bpm.  She began bleeding and having mild cramping yesterday morning (24) cramping.      Recent Tests:  She had a urine pregnancy test that was done 4 weeks ago that was positive.  Rh Status: Positive  She reports no additional symptoms or complaints.    The additional following portions of the patient's history were reviewed and updated as appropriate: allergies, current medications, past family history, past medical history, past social history, past surgical history, and problem list.    Review of Systems   Genitourinary:  Positive for pelvic pain and vaginal bleeding.     All other systems reviewed and are negative.     I have reviewed and agree with the HPI, ROS, and historical information as entered above. Gracie Dickens, APRN      Objective   /80   Ht 170.2 cm (67\")   Wt 69.9 kg (154 lb)   LMP 2024 (Exact Date)   Breastfeeding Yes   BMI 24.12 kg/m²     Physical Exam  Vitals and nursing note reviewed.   Constitutional:       General: She is not in acute distress.     Appearance: Normal appearance. She is not ill-appearing.   Pulmonary:      Effort: Pulmonary effort is normal. No respiratory distress.   Skin:     General: Skin is warm and dry.   Neurological:      Mental Status: She is alert and oriented to person, place, and time.   Psychiatric:         Mood and Affect: Mood normal.         Behavior: Behavior normal.            Assessment and Plan    Problem List Items Addressed This Visit    None  Visit Diagnoses       Missed     -  Primary            D/w pt US today shows a GS with no heart movement.  Options reviewed; she requests written Rx for Cytotec in case she wants to fill it.  Bleeding and " infection precautions reviewed.  MBT +  Return in about 1 week (around 8/8/2024) for US FU.        Gracie Dickens, APRN  08/01/2024

## 2024-08-09 ENCOUNTER — OFFICE VISIT (OUTPATIENT)
Dept: OBSTETRICS AND GYNECOLOGY | Facility: CLINIC | Age: 39
End: 2024-08-09
Payer: OTHER GOVERNMENT

## 2024-08-09 VITALS
WEIGHT: 152.6 LBS | SYSTOLIC BLOOD PRESSURE: 102 MMHG | DIASTOLIC BLOOD PRESSURE: 62 MMHG | BODY MASS INDEX: 23.95 KG/M2 | HEIGHT: 67 IN

## 2024-08-09 DIAGNOSIS — O03.4 RETAINED PRODUCTS OF CONCEPTION AFTER MISCARRIAGE: ICD-10-CM

## 2024-08-09 DIAGNOSIS — O02.1 MISSED ABORTION: Primary | ICD-10-CM

## 2024-08-09 NOTE — PROGRESS NOTES
"    Chief Complaint   Patient presents with    Follow-up            HPI  Natasha Beckett is a 39 y.o. female, , who presents for follow up on a Missed AB. At her last visit she chose to use cytotec for treatment. Patient  reports she never took the Cytotec. Since then she has passage of tissue. Her bleeding today is  light . She denies associated abdominal or pelvic pain.. Her past medical history is notable for spontaneous . She reports no additional symptoms or complaints.    Recent Tests:  US today: yes  Rh Status: Positive      The additional following portions of the patient's history were reviewed and updated as appropriate: allergies and current medications.    Review of Systems   Genitourinary:  Positive for vaginal bleeding (light).   All other systems reviewed and are negative.        I have reviewed and agree with the HPI, ROS, and historical information as entered above. Sarah Valle, APRN      Objective   /62 (BP Location: Right arm, Patient Position: Sitting, Cuff Size: Adult)   Ht 170.2 cm (67.01\")   Wt 69.2 kg (152 lb 9.6 oz)   LMP 2024 (Exact Date)   Breastfeeding No   BMI 23.89 kg/m²     Physical Exam  Vitals and nursing note reviewed.   Constitutional:       General: She is not in acute distress.     Appearance: Normal appearance. She is not ill-appearing, toxic-appearing or diaphoretic.   Pulmonary:      Effort: Pulmonary effort is normal. No respiratory distress.   Abdominal:      General: There is no distension.      Palpations: There is no mass.      Tenderness: There is no abdominal tenderness. There is no guarding or rebound.      Hernia: No hernia is present.   Skin:     General: Skin is warm and dry.   Neurological:      Mental Status: She is alert and oriented to person, place, and time.   Psychiatric:         Mood and Affect: Mood normal.         Behavior: Behavior normal.         Thought Content: Thought content normal.         Judgment: " Judgment normal.            Assessment and Plan    Problem List Items Addressed This Visit    None  Visit Diagnoses       Missed     -  Primary    Relevant Orders    HCG, B-subunit, Quantitative    US Non-ob Transvaginal    Retained products of conception after miscarriage        Relevant Orders    US Non-ob Transvaginal            Missed AB  Repeat U/S in 1 week(s).  Pelvic Rest.  No douching, intercourse or use of tampons.  Call for an increase in bleeding, abdominal pain, or fever.  Options discussed with patient.  Cytotec prescribed.  Bleeding precautions reviewed.  Report to ED if saturating a pad greater than every 30-60 mins.  Patient did not take cytotec that was prescribed to her last week. She passed tissue on her own. Ultrasound today showed no gestational sac however, area seen within the fundus of uterus well circumscribed on the transverse view appears to have some calcifications in the area unable to rule out retained products.  Encouraged patient to take cytotec this weekend. Have a supportive person around her at all times to monitor for anemia s/s.   HCG today. Will follow weekly until less than 5.  Follow up us next week     Counseling was given to patient for the following topics: diagnostic results, instructions for management, risk factor reductions, prognosis, patient and family education, impressions, risks and benefits of treatment options, and importance of treatment compliance . Total time of the encounter was 30 minutes and 20 minutes was spend counseling.      Sarah Valle, APRN  2024

## 2024-08-10 LAB — HCG INTACT+B SERPL-ACNC: 81.1 MIU/ML

## 2024-08-13 ENCOUNTER — OFFICE VISIT (OUTPATIENT)
Dept: OBSTETRICS AND GYNECOLOGY | Facility: CLINIC | Age: 39
End: 2024-08-13
Payer: OTHER GOVERNMENT

## 2024-08-13 VITALS
SYSTOLIC BLOOD PRESSURE: 104 MMHG | BODY MASS INDEX: 24.08 KG/M2 | DIASTOLIC BLOOD PRESSURE: 62 MMHG | WEIGHT: 153.4 LBS | HEIGHT: 67 IN

## 2024-08-13 DIAGNOSIS — O02.1 MISSED ABORTION: Primary | ICD-10-CM

## 2024-08-13 DIAGNOSIS — O03.4 RETAINED PRODUCTS OF CONCEPTION AFTER MISCARRIAGE: ICD-10-CM

## 2024-08-13 PROCEDURE — 99213 OFFICE O/P EST LOW 20 MIN: CPT

## 2024-08-13 NOTE — PROGRESS NOTES
"    Chief Complaint   Patient presents with    Follow-up            HPI  Natasha Beckett is a 39 y.o. female, , who presents for follow up on a Missed AB. At her last visit she chose to use cytotec for treatment.  The plan was to return in one week for possible retained products. Since then she has not passage of tissue. Her bleeding today is spotting. She denies associated abdominal or pelvic pain.. Her past medical history is non-contributory. She reports no additional symptoms or complaints.    Recent Tests:  US today: yes Findings: Gestational sac, yolk sac, embryo, and cardiac activity:not visualized; Uterus: Endometrium: Area seen with in the fundus of the UT well circumscribed. Appears to have some calcifications within area. Unable to r/o retained products.   Rh Status: Positive      The additional following portions of the patient's history were reviewed and updated as appropriate: allergies, current medications, past family history, past medical history, past social history, and past surgical history.    Review of Systems   All other systems reviewed and are negative.        I have reviewed and agree with the HPI, ROS, and historical information as entered above. Sarah Valle, APRN      Objective   /62 (BP Location: Right arm, Patient Position: Sitting, Cuff Size: Adult)   Ht 170.2 cm (67.01\")   Wt 69.6 kg (153 lb 6.4 oz)   LMP 2024 (Exact Date)   Breastfeeding No   BMI 24.02 kg/m²     Physical Exam  Vitals and nursing note reviewed.   Constitutional:       Appearance: Normal appearance.   Pulmonary:      Effort: Pulmonary effort is normal. No respiratory distress.   Abdominal:      Palpations: Abdomen is soft.   Skin:     General: Skin is warm and dry.   Neurological:      Mental Status: She is alert and oriented to person, place, and time.   Psychiatric:         Mood and Affect: Mood normal.         Behavior: Behavior normal.         Thought Content: Thought content " normal.         Judgment: Judgment normal.            Assessment and Plan    Problem List Items Addressed This Visit    None  Visit Diagnoses       Missed     -  Primary    Relevant Orders    HCG, B-subunit, Quantitative    Retained products of conception after miscarriage        Relevant Orders    HCG, B-subunit, Quantitative            Missed AB  Call for an increase in bleeding, abdominal pain, or fever.  Options discussed with patient.  Report to ED if saturating a pad greater than every 30-60 mins.  Ultrasound today revealed area seen within fundus of uterus circumscribed appears to have some calcifications in area. Reviewed with dr bryant.   Will follow HCG <5. Today's stat HCG was 24.27-repeat in one week.      Sarah Valle, APRN  2024

## 2024-08-21 ENCOUNTER — LAB (OUTPATIENT)
Dept: OBSTETRICS AND GYNECOLOGY | Facility: CLINIC | Age: 39
End: 2024-08-21
Payer: OTHER GOVERNMENT

## 2024-08-21 DIAGNOSIS — O02.1 MISSED ABORTION: Primary | ICD-10-CM

## 2024-08-22 LAB — HCG INTACT+B SERPL-ACNC: 3 MIU/ML

## 2024-08-30 LAB — HCG INTACT+B SERPL-ACNC: 24 MIU/ML

## 2024-09-12 ENCOUNTER — OFFICE VISIT (OUTPATIENT)
Dept: OBSTETRICS AND GYNECOLOGY | Facility: CLINIC | Age: 39
End: 2024-09-12
Payer: OTHER GOVERNMENT

## 2024-09-12 VITALS
HEIGHT: 67 IN | WEIGHT: 152 LBS | DIASTOLIC BLOOD PRESSURE: 60 MMHG | SYSTOLIC BLOOD PRESSURE: 100 MMHG | BODY MASS INDEX: 23.86 KG/M2

## 2024-09-12 DIAGNOSIS — N84.0 ENDOMETRIAL POLYP: ICD-10-CM

## 2024-09-12 DIAGNOSIS — Z01.419 PAP TEST, AS PART OF ROUTINE GYNECOLOGICAL EXAMINATION: Primary | ICD-10-CM

## 2024-09-12 DIAGNOSIS — Z01.419 WOMEN'S ANNUAL ROUTINE GYNECOLOGICAL EXAMINATION: ICD-10-CM

## 2024-09-12 NOTE — PROGRESS NOTES
Gynecologic Annual Exam Note        Gynecologic Exam        Subjective     HPI  Natasha Beckett is a 39 y.o.  female who presents for annual well woman exam as a established patient. There were no changes to her medical or surgical history since her last visit.. Patient's last menstrual period was 2024 (exact date). Her periods occur every month, lasting 6 days.  The flow is moderate. She denies dysmenorrhea. Marital Status: .  She is sexually active. She has not had new partners.. STD testing recommendations have been explained to the patient and she does not desire STD testing.    She had an SAB last month.  Hcg negative.  US  showed possible polyp or fibroid at fundus.  Her first period after SAB was wnl.  She denies pain, AUB now.  She is planning trying to conceive again and is concerned about that area causing at problem in the future.    The patient would like to discuss the following complaints today: none    Additional OB/GYN History   contraceptive methods: None  Desires to: do not start contraception  Thromboembolic Disease: family history  History of migraines: no  Age of menarche: 12    History of STD: no    Last Pap : 21. Results: negative. HPV: negative.   Last Completed Pap Smear            Ordered - PAP SMEAR (Every 3 Years) Ordered on 2021  SCANNED - PAP SMEAR    09/10/2020  SCANNED - PAP SMEAR    08/15/2019  Done - negative    10/01/2017  Done                     History of abnormal Pap smear: no  Gardasil status: none  Family history of uterine, colon, breast, or ovarian cancer: yes - MGM:breast  Performs monthly Self-Breast Exam: yes  Exercises Regularly:yes  Feelings of Anxiety or Depression: yes - anxiety  Tobacco Usage?: No       Current Outpatient Medications:     Prenatal Vit-Fe Fumarate-FA (PRENATAL VITAMIN PO), Take  by mouth., Disp: , Rfl:     vitamin D3 125 MCG (5000 UT) capsule capsule, Take 1 capsule by mouth Daily.,  "Disp: , Rfl:      Patient denies the need for medication refills today.    OB History          4    Para   2    Term   2       0    AB   2    Living   2         SAB   2    IAB   0    Ectopic   0    Molar   0    Multiple   0    Live Births   2          Obstetric Comments   FOB #1 : Pregnancy #1; #2; #3                Health Maintenance   Topic Date Due    ANNUAL PHYSICAL  2019    Annual Gynecologic Pelvic and Breast Exam  2024    COVID-19 Vaccine (2023- season) Never done    INFLUENZA VACCINE  2024    PAP SMEAR  2024    TDAP/TD VACCINES (2 - Td or Tdap) 2027    HEPATITIS C SCREENING  Completed    Pneumococcal Vaccine 0-64  Aged Out       Past Medical History:   Diagnosis Date    Acid reflux     no longer an issue, per patient (2020)    Kidney stone 10/11/2022    Pneumonia 2019    Skeletal malocclusion 2024        Past Surgical History:   Procedure Laterality Date    D & C WITH SUCTION  21    WISDOM TOOTH EXTRACTION  10/2002       The additional following portions of the patient's history were reviewed and updated as appropriate: allergies, current medications, past family history, past medical history, past social history, past surgical history, and problem list.    Review of Systems   Constitutional: Negative.    HENT: Negative.     Eyes: Negative.    Respiratory: Negative.     Cardiovascular: Negative.    Gastrointestinal: Negative.    Endocrine: Negative.    Genitourinary: Negative.    Musculoskeletal: Negative.    Skin: Negative.    Allergic/Immunologic: Negative.    Neurological: Negative.    Hematological: Negative.    Psychiatric/Behavioral:  The patient is nervous/anxious.          I have reviewed and agree with the HPI, ROS, and historical information as entered above. Gracie Dickens, APRN          Objective   /60 (BP Location: Right arm, Patient Position: Sitting, Cuff Size: Adult)   Ht 170.2 cm (67.01\")   Wt 68.9 kg (152 lb)   LMP " 08/30/2024 (Exact Date)   Breastfeeding Yes   BMI 23.80 kg/m²     Physical Exam  Vitals and nursing note reviewed. Exam conducted with a chaperone present.   Constitutional:       General: She is not in acute distress.     Appearance: Normal appearance. She is well-developed and normal weight. She is not ill-appearing.   Neck:      Thyroid: No thyroid mass or thyromegaly.   Pulmonary:      Effort: Pulmonary effort is normal. No respiratory distress or retractions.   Chest:      Chest wall: No mass.   Breasts:     Right: Normal. No mass, nipple discharge, skin change or tenderness.      Left: Normal. No mass, nipple discharge, skin change or tenderness.   Abdominal:      General: There is no distension.      Palpations: Abdomen is soft. Abdomen is not rigid. There is no mass.      Tenderness: There is no abdominal tenderness. There is no guarding or rebound.      Hernia: No hernia is present. There is no hernia in the left inguinal area.   Genitourinary:     General: Normal vulva.      Labia:         Right: No rash, tenderness or lesion.         Left: No rash, tenderness or lesion.       Vagina: Normal. No vaginal discharge or lesions.      Cervix: Normal.      Uterus: Normal. Not enlarged, not fixed and not tender.       Adnexa: Right adnexa normal and left adnexa normal.        Right: No mass or tenderness.          Left: No mass or tenderness.        Rectum: No external hemorrhoid.   Musculoskeletal:      Cervical back: No muscular tenderness.   Skin:     General: Skin is warm and dry.   Neurological:      Mental Status: She is alert and oriented to person, place, and time.   Psychiatric:         Mood and Affect: Mood normal.         Behavior: Behavior normal.            Assessment and Plan    Problem List Items Addressed This Visit    None  Visit Diagnoses       Pap test, as part of routine gynecological examination    -  Primary    Relevant Orders    LIQUID-BASED PAP SMEAR WITH HPV GENOTYPING REGARDLESS OF  INTERPRETATION (FELICIANO,COR,MAD)    Women's annual routine gynecological examination                GYN annual well woman exam.   Reviewed pap guidelines.   Reviewed monthly self breast exams.  Instructed to call with lumps, pain, or breast discharge.    Reviewed exercise as a preventative health measures.   Reccommended Flu Vaccine in Fall of each year.  RTC in 1 year or PRN with problems  She is concerned that the area noted on US could cause future pregnancy problems.  Enc her to RTO in 3 mo for US FU.  Start PNV.    Gracie Dickens, APRN  09/12/2024

## 2024-09-18 LAB — REF LAB TEST METHOD: NORMAL

## 2024-10-16 NOTE — OUTREACH NOTE
"JESSE call completed.  Please refer to TCM call flowsheet for call documentation.    The patient says she is feeling some better. She states she continues with \"a little\" SOa, cough, and wheezing. She denies any fever, chills, n/v/d, or pain. PCP appt rescheduled to [facility] at pt's request as she resides closer to that practice. Pt denies any further questions or needs at time of call.  " 4

## 2024-12-04 ENCOUNTER — TELEPHONE (OUTPATIENT)
Dept: OBSTETRICS AND GYNECOLOGY | Facility: CLINIC | Age: 39
End: 2024-12-04

## 2024-12-04 NOTE — TELEPHONE ENCOUNTER
Caller: Natasha Beckett    Relationship to patient: Self    Best call back number: [phone] (home)      Chief complaint: NEED DIFFERENT TIME OR DAY    Type of visit: NEW OB AND U/S    Requested date: 12/26/24 LATER TIME OR ANYTIME ON 12/27/24- MAY BE  A DIFFERENT PROVIDER TOO     If rescheduling, when is the original appointment: 12/26/24

## 2024-12-27 ENCOUNTER — INITIAL PRENATAL (OUTPATIENT)
Dept: OBSTETRICS AND GYNECOLOGY | Facility: CLINIC | Age: 39
End: 2024-12-27
Payer: OTHER GOVERNMENT

## 2024-12-27 VITALS — DIASTOLIC BLOOD PRESSURE: 62 MMHG | SYSTOLIC BLOOD PRESSURE: 100 MMHG | BODY MASS INDEX: 26.26 KG/M2 | WEIGHT: 167.7 LBS

## 2024-12-27 DIAGNOSIS — O09.529 ANTEPARTUM MULTIGRAVIDA OF ADVANCED MATERNAL AGE: ICD-10-CM

## 2024-12-27 DIAGNOSIS — O09.521 MULTIGRAVIDA OF ADVANCED MATERNAL AGE IN FIRST TRIMESTER: ICD-10-CM

## 2024-12-27 DIAGNOSIS — Z34.90 PRENATAL CARE, ANTEPARTUM: Primary | ICD-10-CM

## 2024-12-27 RX ORDER — LEVOMEFOLATE CALCIUM 7.5 MG
TABLET ORAL DAILY
COMMUNITY

## 2024-12-27 NOTE — PROGRESS NOTES
Initial ob visit     CC- Here for care of pregnancy        Natasha Beckett is a 39 y.o. female, , who presents for her first obstetrical visit.  Patient's last menstrual period was 10/25/2024.. Her ENDY is 25 Current GA is 9 week 2 d    Initial positive test date : 24, UPT        Her periods are every 28-29 days.  Prior obstetric issues: none  Patient's past medical history is significant for:  skeletal maloccusion  .  Family history of genetic issues (includes FOB): child with autism and Stroud syndrome   Prior infections concerning in pregnancy (Rash, fever in last 2 weeks): No  Varicella Hx - history of chicken pox  Prior testing for Cystic Fibrosis Carrier or Sickle Cell Trait- unsure  Prepregnancy BMI - Body mass index is 26.26 kg/m².  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                  History of  abnormal Pap smear: no  Family history of uterine, colon, breast, or ovarian cancer: yes - MGM:breast  Feelings of Anxiety or Depression: no  Tobacco Usage?: No   Alcohol/Drug Use?: NO  Over the age of 35 at delivery: yes  Genetic Screening: desires mat21 with gender  Flu Status: Declines    PMH    Current Outpatient Medications:     Acetylcarnitine powder, Take  by mouth., Disp: , Rfl:     l-methylfolate calcium (DEPLIN) 7.5 MG tablet tablet, Take  by mouth Daily., Disp: , Rfl:     MAGNESIUM GLUCONATE PO, Take  by mouth., Disp: , Rfl:     Prenatal Vit-Fe Fumarate-FA (PRENATAL VITAMIN PO), Take  by mouth., Disp: , Rfl:     vitamin D3 125 MCG (5000 UT) capsule capsule, Take 1 capsule by mouth Daily., Disp: , Rfl:     VITAMIN K PO, Take  by mouth., Disp: , Rfl:      Past Medical History:   Diagnosis Date    Acid reflux     no longer an issue, per patient (9/2020)    Kidney stone 10/11/2022    Pneumonia 01/06/2019    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        Past Surgical History:   Procedure Laterality Date    D & C WITH SUCTION  8/20/21    WISDOM TOOTH EXTRACTION  10/2002       Review of Systems   Review of Systems   All other systems reviewed and are negative.      Patient Reports:  none  Patient Denies:excessive nausea , excessive vomiting, and vaginal bleeding  All systems reviewed and otherwise normal.    I have reviewed and agree with the HPI, ROS, and historical information as entered above. Sarah Valle, APRN      /62   Wt 76.1 kg (167 lb 11.2 oz)   LMP 10/25/2024   BMI 26.26 kg/m²     The additional following portions of the patient's history were reviewed and updated as appropriate: allergies, current medications, past family history, past medical history, past social history, past surgical history, and problem list.    Physical Exam  General:  well developed; well nourished  no acute distress  mentation appropriate   Chest/Respiratory: No labored breathing, normal respiratory effort, normal  appearance, no respiratory noises noted   Heart:  normal rate, regular rhythm,  no murmurs, rubs, or gallops   Thyroid: not examined   Breasts:  Not performed.   Abdomen: soft, non-tender; no masses  no umbilical or inguinal hernias are present   Pelvis: Not performed.        Assessment and Plan    Problem List Items Addressed This Visit          Gravid and     Antepartum multigravida of advanced maternal age    Overview     [facility] Anatomy US  Baby aspirin 81mg daily @ 12 weeks          Other Visit Diagnoses       Prenatal care, antepartum    -  Primary    Relevant Orders    Obstetric Panel    HIV-1 / O / 2 Ag / Antibody    Urine Culture - Urine, Urine, Clean Catch    Urinalysis With Microscopic - Urine, Clean Catch    Chlamydia trachomatis, Neisseria gonorrhoeae, PCR - Urine, Urine, Clean Catch    Urine Drug Screen - Urine, Clean Catch    [id number] PLUS Core+SCA+ESS - Blood,    Folate RBC    Vitamin D 25 Hydroxy    Multigravida of advanced maternal age in first trimester        Relevant Orders    TSH+Free T4    Hemoglobin A1c            Pregnancy at 9 weeks and 2 days SIUP  ENDY 25  Reviewed routine prenatal care with the office and educational materials given  Lab(s) Ordered  Discussed options for genetic testing including first trimester nuchal translucency screen, genetic disease carrier testing, quadruple screen, and NIPT  Discontinue the use of all non-medicinal drugs and chemicals  Patient is on Prenatal vitamins  Activity recommendation : 150 minutes/week of moderate intensity aerobic activity unless we limit for bleeding, hypertension or other pregnancy complication   hgb A1C today  TFT today  discussed baby aspirin from 12 weeks to delivery for prevention of preeclampsia   AMA will need [facility] anatomy scan  Desires Maternity 21 today  Added folate and vitamin D r/t multiple supplements taken by patient (was told by her children's pediatrician to take them because she has MTHFR gene but not  diagnosed)  Follow up in four weeks BREE Valle, APRN  12/27/2024

## 2024-12-28 LAB
25(OH)D3+25(OH)D2 SERPL-MCNC: 37.5 NG/ML (ref 30–100)
ABO GROUP BLD: ABNORMAL
APPEARANCE UR: CLEAR
BACTERIA #/AREA URNS HPF: NORMAL /[HPF]
BASOPHILS # BLD AUTO: 0 X10E3/UL (ref 0–0.2)
BASOPHILS NFR BLD AUTO: 0 %
BILIRUB UR QL STRIP: NEGATIVE
BLD GP AB SCN SERPL QL: NEGATIVE
CASTS URNS QL MICRO: NORMAL /LPF
COLOR UR: YELLOW
EOSINOPHIL # BLD AUTO: 0.1 X10E3/UL (ref 0–0.4)
EOSINOPHIL NFR BLD AUTO: 1 %
EPI CELLS #/AREA URNS HPF: NORMAL /HPF (ref 0–10)
ERYTHROCYTE [DISTWIDTH] IN BLOOD BY AUTOMATED COUNT: 11.7 % (ref 11.7–15.4)
GLUCOSE UR QL STRIP: NEGATIVE
HBA1C MFR BLD: 4.6 % (ref 4.8–5.6)
HBV SURFACE AG SERPL QL IA: NEGATIVE
HCT VFR BLD AUTO: 41.5 % (ref 34–46.6)
HCV IGG SERPL QL IA: NON REACTIVE
HGB BLD-MCNC: 13.7 G/DL (ref 11.1–15.9)
HGB UR QL STRIP: NEGATIVE
HIV 1+2 AB+HIV1 P24 AG SERPL QL IA: NON REACTIVE
IMM GRANULOCYTES # BLD AUTO: 0 X10E3/UL (ref 0–0.1)
IMM GRANULOCYTES NFR BLD AUTO: 0 %
KETONES UR QL STRIP: NEGATIVE
LEUKOCYTE ESTERASE UR QL STRIP: NEGATIVE
LYMPHOCYTES # BLD AUTO: 1.7 X10E3/UL (ref 0.7–3.1)
LYMPHOCYTES NFR BLD AUTO: 17 %
MCH RBC QN AUTO: 31.7 PG (ref 26.6–33)
MCHC RBC AUTO-ENTMCNC: 33 G/DL (ref 31.5–35.7)
MCV RBC AUTO: 96 FL (ref 79–97)
MICRO URNS: NORMAL
MICRO URNS: NORMAL
MONOCYTES # BLD AUTO: 0.5 X10E3/UL (ref 0.1–0.9)
MONOCYTES NFR BLD AUTO: 5 %
NEUTROPHILS # BLD AUTO: 7.5 X10E3/UL (ref 1.4–7)
NEUTROPHILS NFR BLD AUTO: 77 %
NITRITE UR QL STRIP: NEGATIVE
PH UR STRIP: 6.5 [PH] (ref 5–7.5)
PLATELET # BLD AUTO: 250 X10E3/UL (ref 150–450)
PROT UR QL STRIP: NEGATIVE
RBC # BLD AUTO: 4.32 X10E6/UL (ref 3.77–5.28)
RBC #/AREA URNS HPF: NORMAL /HPF (ref 0–2)
RH BLD: POSITIVE
RPR SER QL: NON REACTIVE
RUBV IGG SERPL IA-ACNC: 0.98 INDEX
SP GR UR STRIP: 1.01 (ref 1–1.03)
T4 FREE SERPL-MCNC: 1.19 NG/DL (ref 0.82–1.77)
TSH SERPL DL<=0.005 MIU/L-ACNC: 0.67 UIU/ML (ref 0.45–4.5)
UROBILINOGEN UR STRIP-MCNC: 0.2 MG/DL (ref 0.2–1)
WBC # BLD AUTO: 9.8 X10E3/UL (ref 3.4–10.8)
WBC #/AREA URNS HPF: NORMAL /HPF (ref 0–5)

## 2024-12-29 LAB
BACTERIA UR CULT: NO GROWTH
BACTERIA UR CULT: NORMAL
FOLATE BLD-MCNC: 564 NG/ML
FOLATE RBC-MCNC: 1359 NG/ML

## 2024-12-30 LAB
AMPHETAMINES UR QL SCN: NEGATIVE NG/ML
BARBITURATES UR QL SCN: NEGATIVE NG/ML
BENZODIAZ UR QL SCN: NEGATIVE NG/ML
BZE UR QL SCN: NEGATIVE NG/ML
C TRACH RRNA SPEC QL NAA+PROBE: NEGATIVE
CANNABINOIDS UR QL SCN: NEGATIVE NG/ML
CREAT UR-MCNC: 14.6 MG/DL (ref 20–300)
LABORATORY COMMENT REPORT: ABNORMAL
METHADONE UR QL SCN: NEGATIVE NG/ML
N GONORRHOEA RRNA SPEC QL NAA+PROBE: NEGATIVE
OPIATES UR QL SCN: NEGATIVE NG/ML
OXYCODONE+OXYMORPHONE UR QL SCN: NEGATIVE NG/ML
PCP UR QL: NEGATIVE NG/ML
PH UR: 5.7 [PH] (ref 4.5–8.9)
PROPOXYPH UR QL SCN: NEGATIVE NG/ML
SP GR UR: 1

## 2025-01-23 ENCOUNTER — ROUTINE PRENATAL (OUTPATIENT)
Dept: OBSTETRICS AND GYNECOLOGY | Facility: CLINIC | Age: 40
End: 2025-01-23
Payer: OTHER GOVERNMENT

## 2025-01-23 VITALS — WEIGHT: 168.7 LBS | BODY MASS INDEX: 26.41 KG/M2 | SYSTOLIC BLOOD PRESSURE: 106 MMHG | DIASTOLIC BLOOD PRESSURE: 68 MMHG

## 2025-01-23 DIAGNOSIS — O09.529 ANTEPARTUM MULTIGRAVIDA OF ADVANCED MATERNAL AGE: ICD-10-CM

## 2025-01-23 DIAGNOSIS — Z34.91 PRENATAL CARE IN FIRST TRIMESTER: Primary | ICD-10-CM

## 2025-01-23 DIAGNOSIS — Z3A.13 13 WEEKS GESTATION OF PREGNANCY: ICD-10-CM

## 2025-01-23 PROBLEM — Z34.90 PREGNANCY: Status: ACTIVE | Noted: 2025-01-23

## 2025-01-23 LAB
GLUCOSE UR STRIP-MCNC: NEGATIVE MG/DL
PROT UR STRIP-MCNC: NEGATIVE MG/DL

## 2025-01-23 NOTE — PROGRESS NOTES
OB FOLLOW UP  CC- Here for care of pregnancy        Natasha Beckett is a 39 y.o.  13w1d patient being seen today for her obstetrical follow up visit. Patient reports headaches that usually subside with rest.     Her prenatal care is complicated by (and status) :   Patient Active Problem List   Diagnosis    Antepartum multigravida of advanced maternal age    Pregnancy       Genetic testing?: already completed and was normal.  NOB labs reviewed  Flu Status: Declines  Ultrasound Today: No    ROS -   Patient Denies: leaking of fluid, vaginal bleeding, dysuria, excessive vomiting, and more than 6 contractions per hour  All other systems reviewed and are negative.     The additional following portions of the patient's history were reviewed and updated as appropriate: allergies, current medications, past family history, past medical history, past social history, past surgical history, and problem list.    I have reviewed and agree with the HPI, ROS, and historical information as entered above. Ani Rosa MD          /68   Wt 76.5 kg (168 lb 11.2 oz)   LMP 10/25/2024   BMI 26.41 kg/m²         EXAM:     Prenatal Vitals  BP: 106/68  Weight: 76.5 kg (168 lb 11.2 oz)   Fetal Heart Rate: +          Urine Glucose Read-only: Negative  Urine Protein Read-only: Negative       Assessment and Plan    Problem List Items Addressed This Visit       Antepartum multigravida of advanced maternal age    Overview     [facility] Anatomy US  Baby aspirin 81mg daily @ 12 weeks  NIPT low risk         Pregnancy    Overview     2 prev  term          Other Visit Diagnoses       Prenatal care in first trimester    -  Primary    Relevant Orders    POC Urinalysis Dipstick (Completed)            Pregnancy at 13w1d  Labs reviewed from New OB Visit.  Counseled on genetic testing, carrier status and option for NT screen- done and low risk.   Activity and Exercise discussed.  Patient is on Prenatal vitamins  Return in about 1  month (around 2/23/2025) for F/U Prenatal.    Ani Rosa MD  01/23/2025

## 2025-02-27 ENCOUNTER — ROUTINE PRENATAL (OUTPATIENT)
Dept: OBSTETRICS AND GYNECOLOGY | Facility: CLINIC | Age: 40
End: 2025-02-27
Payer: OTHER GOVERNMENT

## 2025-02-27 ENCOUNTER — TELEPHONE (OUTPATIENT)
Dept: OBSTETRICS AND GYNECOLOGY | Facility: CLINIC | Age: 40
End: 2025-02-27

## 2025-02-27 VITALS — DIASTOLIC BLOOD PRESSURE: 72 MMHG | SYSTOLIC BLOOD PRESSURE: 108 MMHG | BODY MASS INDEX: 27.09 KG/M2 | WEIGHT: 173 LBS

## 2025-02-27 DIAGNOSIS — Z34.90 PRENATAL CARE, ANTEPARTUM: Primary | ICD-10-CM

## 2025-02-27 DIAGNOSIS — Z3A.18 18 WEEKS GESTATION OF PREGNANCY: ICD-10-CM

## 2025-02-27 DIAGNOSIS — O09.529 ANTEPARTUM MULTIGRAVIDA OF ADVANCED MATERNAL AGE: ICD-10-CM

## 2025-02-27 LAB
GLUCOSE UR STRIP-MCNC: NEGATIVE MG/DL
PROT UR STRIP-MCNC: NEGATIVE MG/DL

## 2025-02-27 NOTE — TELEPHONE ENCOUNTER
Provider: DR MCBRIDE    Caller: Natasha Beckett    Phone Number: [phone] / LVM    Reason for Call: [facility] APPT IS 03/14/25 - PT'S OB F/U W/ DR FUNK IS 03/20/25 - [facility] COULD NOT SCHEDULE THE PT ON 03/20/25 AND SAID THAT THEY WOULD NOT BE ABLE TO GET HER IN UNTIL APRIL IF SHE CAN NOT MAKE 03/14/25 - PT HAS TO WORK ON 03/14/25 AND NEEDS TO R/S [facility] - CAN IT WAIT UNTIL APRIL     PLEASE CALL THE PT TO DISCUSS - PT DOESN'T KNOW WHAT TO DO     THANK YOU!

## 2025-02-27 NOTE — TELEPHONE ENCOUNTER
Patient of Dr. Rosa;  @ 18 1d. LOV was today.   Returned patient's call.   Patient is scheduled to see [facility] 25 for anatomy scan; referred for AMA-multip. States she has to work that day and [facility]'s next available appointment is not until sometime in April.   Discussed with Dr. Rosa. She would prefer patient have anatomy scan with [facility] as scheduled but if patient is not able to make that appointment, we can do anatomy scan here and have patient see [facility] about 4 weeks later.   Informed patient. She v/u; plans to see if she can change her work schedule and call us back if she needs to reschedule appointments or needs to have anatomy scan done here.

## 2025-02-27 NOTE — PROGRESS NOTES
OB FOLLOW UP  CC- Here for care of pregnancy        Natasha Beckett is a 40 y.o.  18w1d patient being seen today for her obstetrical follow up visit. Patient reports no complaints    Her prenatal care is complicated by (and status) :   Patient Active Problem List   Diagnosis    Antepartum multigravida of advanced maternal age    Pregnancy       Flu Status: Declines  Ultrasound Today: No    AFP: desires    ROS -   Patient Denies: leaking of fluid, vaginal bleeding, dysuria, excessive vomiting, and more than 6 contractions per hour  Fetal Movement: absent  Other than what is documented in the HPI, all other systems reviewed and are negative.       The additional following portions of the patient's history were reviewed and updated as appropriate: allergies, current medications, past family history, past medical history, past social history, past surgical history, and problem list.      I have reviewed and agree with the HPI, ROS, and historical information as entered above. Ani Rosa MD          EXAM:     Prenatal Vitals  BP: 108/72  Weight: 78.5 kg (173 lb)   Fetal Heart Rate: +         Urine Glucose Read-only: Negative  Urine Protein Read-only: Negative           Assessment and Plan    Problem List Items Addressed This Visit       Antepartum multigravida of advanced maternal age    Overview     [facility] Anatomy US  Baby aspirin 81mg daily @ 12 weeks  NIPT low risk, AFP         Relevant Orders    [facility]    Pregnancy    Overview     2 prev  term          Other Visit Diagnoses       Prenatal care, antepartum    -  Primary    Relevant Orders    POC Urinalysis Dipstick (Completed)    Alpha Fetoprotein, Maternal            Pregnancy at 18w1d  Fetal status reassuring.   Counseled on MSAFP alone in relation to OTD and placental issues.  Desires today  Anatomy scan next visit. At [facility]  Activity and Exercise discussed.  Patient is on Prenatal vitamins  Return in about 2 weeks  (around 3/13/2025) for F/U Prenatal, U/S Next Visit.    Ani Rosa MD  02/27/2025

## 2025-03-01 LAB
AFP INTERP SERPL-IMP: NORMAL
AFP INTERP SERPL-IMP: NORMAL
AFP MOM SERPL: 0.67
AFP SERPL-MCNC: 26.9 NG/ML
AGE AT DELIVERY: 40.5 YR
GA METHOD: NORMAL
GA: 18.1 WEEKS
IDDM PATIENT QL: NO
LABORATORY COMMENT REPORT: NORMAL
MULTIPLE PREGNANCY: NO
NEURAL TUBE DEFECT RISK FETUS: NORMAL %
RESULT: NORMAL

## 2025-03-14 ENCOUNTER — OFFICE VISIT (OUTPATIENT)
Dept: OBSTETRICS AND GYNECOLOGY | Facility: HOSPITAL | Age: 40
End: 2025-03-14
Payer: OTHER GOVERNMENT

## 2025-03-14 ENCOUNTER — HOSPITAL ENCOUNTER (OUTPATIENT)
Dept: WOMENS IMAGING | Facility: HOSPITAL | Age: 40
Discharge: HOME | End: 2025-03-14
Admitting: OBSTETRICS & GYNECOLOGY
Payer: OTHER GOVERNMENT

## 2025-03-14 ENCOUNTER — ROUTINE PRENATAL (OUTPATIENT)
Dept: OBSTETRICS AND GYNECOLOGY | Facility: CLINIC | Age: 40
End: 2025-03-14
Payer: OTHER GOVERNMENT

## 2025-03-14 VITALS — DIASTOLIC BLOOD PRESSURE: 72 MMHG | WEIGHT: 177 LBS | BODY MASS INDEX: 27.71 KG/M2 | SYSTOLIC BLOOD PRESSURE: 106 MMHG

## 2025-03-14 VITALS — BODY MASS INDEX: 27.96 KG/M2 | SYSTOLIC BLOOD PRESSURE: 109 MMHG | DIASTOLIC BLOOD PRESSURE: 76 MMHG | WEIGHT: 178.6 LBS

## 2025-03-14 DIAGNOSIS — Z28.39 RUBELLA NON-IMMUNE STATUS, ANTEPARTUM: ICD-10-CM

## 2025-03-14 DIAGNOSIS — Z34.90 PRENATAL CARE, ANTEPARTUM: Primary | ICD-10-CM

## 2025-03-14 DIAGNOSIS — Z3A.20 20 WEEKS GESTATION OF PREGNANCY: ICD-10-CM

## 2025-03-14 DIAGNOSIS — O09.529 ANTEPARTUM MULTIGRAVIDA OF ADVANCED MATERNAL AGE: Primary | ICD-10-CM

## 2025-03-14 DIAGNOSIS — O09.529 ANTEPARTUM MULTIGRAVIDA OF ADVANCED MATERNAL AGE: ICD-10-CM

## 2025-03-14 DIAGNOSIS — O09.899 RUBELLA NON-IMMUNE STATUS, ANTEPARTUM: ICD-10-CM

## 2025-03-14 LAB
GLUCOSE UR STRIP-MCNC: NEGATIVE MG/DL
PROT UR STRIP-MCNC: NEGATIVE MG/DL

## 2025-03-14 PROCEDURE — 76811 OB US DETAILED SNGL FETUS: CPT

## 2025-03-14 NOTE — PROGRESS NOTES
Patient seen in Maternal Fetal Medicine clinic today. Please see full note in under imaging tab of patient chart in Epic (Viewpoint report).    Ani Williamson MD

## 2025-03-14 NOTE — PROGRESS NOTES
OB FOLLOW UP  CC- Here for care of pregnancy        Natasha Beckett is a 40 y.o.  20w2d patient being seen today for her obstetrical follow up visit. Patient reports intense back spasms that radiate to shoulders and legs.     Her prenatal care is complicated by (and status) : see below.  Patient Active Problem List   Diagnosis    Antepartum multigravida of advanced maternal age    Pregnancy    Rubella non-immune status, antepartum       Ultrasound Today: Yes at [facility]   AFP was already completed and was normal.    ROS -     Patient Denies: leaking of fluid, vaginal bleeding, dysuria, excessive vomiting, and more than 6 contractions per hour  Fetal Movement : Yes  Other than what is documented in the HPI, all other systems reviewed and are negative.       The additional following portions of the patient's history were reviewed and updated as appropriate: allergies, current medications, and problem list.      I have reviewed and agree with the HPI, ROS, and historical information as entered above. Sarah Valle, APRN      /72 Comment: arm  Wt 80.3 kg (177 lb)   LMP 10/25/2024   BMI 27.71 kg/m²       EXAM:     Prenatal Vitals  BP: 106/72 (arm)  Weight: 80.3 kg (177 lb)   Fetal Heart Rate: [facility]          Urine Glucose Read-only: Negative  Urine Protein Read-only: Negative       Assessment and Plan    Problem List Items Addressed This Visit          Gravid and     Antepartum multigravida of advanced maternal age    Overview   [facility] Anatomy US WNL-Repeat growth @ 32 weeks  Baby aspirin 81mg daily @ 12 weeks  NIPT low risk, AFP neg           Rubella non-immune status, antepartum    Overview   Offer MMR postpartum          Other Visit Diagnoses         Prenatal care, antepartum    -  Primary    Relevant Orders    POC Urinalysis Dipstick (Completed)            Pregnancy at 20w2d  Anatomy scan today is complete and appear within normal limits.  Fetal status reassuring.   Activity and Exercise  discussed.  Patient is on Prenatal vitamins  Discussed bASA for PIH prevention from 12 to 36wk  Discussed discomforts of pregnancy: Encouraged stretching, hydration up to 80 ounces of water daily, wearing a belly band, compression socks, frequent high protein snacks, and heating pad prn to back only.   [facility] scan today was wnl per patient (report is pending) will follow up for growth at 32 weeks  Patient is unsure if she wants to do glucola drink @ 28 weeks due to ingredients-provided her with list of ingredients for her to research so we can decide at next visit. Will need to do blood sugar checks instead if she refuses-VU  Follow up in four weeks BREE Valle, APRN  03/14/2025

## 2025-03-14 NOTE — PROGRESS NOTES
Patient denies any leaking fluid, vaginal bleeding, or contractions.  NIPT negative.  Patient reports next follow-up appointment with Dr. Rosa's office is today.

## 2025-04-03 ENCOUNTER — ROUTINE PRENATAL (OUTPATIENT)
Dept: OBSTETRICS AND GYNECOLOGY | Facility: CLINIC | Age: 40
End: 2025-04-03
Payer: OTHER GOVERNMENT

## 2025-04-03 VITALS — SYSTOLIC BLOOD PRESSURE: 112 MMHG | DIASTOLIC BLOOD PRESSURE: 80 MMHG | WEIGHT: 179.6 LBS | BODY MASS INDEX: 28.12 KG/M2

## 2025-04-03 DIAGNOSIS — Z28.39 RUBELLA NON-IMMUNE STATUS, ANTEPARTUM: ICD-10-CM

## 2025-04-03 DIAGNOSIS — Z3A.23 23 WEEKS GESTATION OF PREGNANCY: ICD-10-CM

## 2025-04-03 DIAGNOSIS — O09.899 RUBELLA NON-IMMUNE STATUS, ANTEPARTUM: ICD-10-CM

## 2025-04-03 DIAGNOSIS — O09.529 ANTEPARTUM MULTIGRAVIDA OF ADVANCED MATERNAL AGE: Primary | ICD-10-CM

## 2025-04-03 DIAGNOSIS — Z34.92 PRENATAL CARE IN SECOND TRIMESTER, UNSPECIFIED GRAVIDITY: ICD-10-CM

## 2025-04-03 LAB
GLUCOSE UR STRIP-MCNC: NEGATIVE MG/DL
PROT UR STRIP-MCNC: NEGATIVE MG/DL

## 2025-04-03 NOTE — PROGRESS NOTES
OB FOLLOW UP  CC- Here for care of pregnancy        Natasha Beckett is a 40 y.o.  23w1d patient being seen today for her obstetrical follow up visit. Patient reports no complaints.     Her prenatal care is complicated by (and status) : see below.  Patient Active Problem List   Diagnosis    Antepartum multigravida of advanced maternal age    Pregnancy    Rubella non-immune status, antepartum     Ultrasound Today: Yes  Reviewed 1 hr glucose testing and TDAP next visit.    ROS -   Patient Denies: leaking of fluid, vaginal bleeding, dysuria, excessive vomiting, and more than 6 contractions per hour  Fetal Movement : normal  Other than what is documented in the HPI, all other systems reviewed and are negative.       The additional following portions of the patient's history were reviewed and updated as appropriate: allergies, current medications, past family history, past medical history, past social history, past surgical history, and problem list.      I have reviewed and agree with the HPI, ROS, and historical information as entered above. Ani Rosa MD      /80   Wt 81.5 kg (179 lb 9.6 oz)   LMP 10/25/2024   BMI 28.12 kg/m²       EXAM:     Prenatal Vitals  BP: 112/80  Weight: 81.5 kg (179 lb 9.6 oz)   Fetal Heart Rate: +      Fundal Height (cm): 23 cm        Urine Glucose Read-only: Negative  Urine Protein Read-only: Negative       Assessment and Plan    Problem List Items Addressed This Visit       Antepartum multigravida of advanced maternal age - Primary    Overview   [facility] Anatomy US WNL-Repeat growth @ 32 weeks  Baby aspirin 81mg daily @ 12 weeks  NIPT low risk, AFP neg           Pregnancy    Overview   2 prev  term         Rubella non-immune status, antepartum    Overview   Offer MMR postpartum          Other Visit Diagnoses         Prenatal care in second trimester, unspecified         Relevant Orders    POC Urinalysis Dipstick (Completed)            Pregnancy at  23w1d  Fetal status reassuring.  No US indicated today.  1 hour gtt, CBC, Antibody screen, TDAP, and RPR next visit. Instructions given  Discussed/encouraged TDAP vaccination after 28 weeks  Activity and Exercise discussed.  Return in about 1 month (around 5/3/2025) for F/U Prenatal, and glucola.      Ani Rosa MD  04/03/2025

## 2025-05-08 ENCOUNTER — ROUTINE PRENATAL (OUTPATIENT)
Dept: OBSTETRICS AND GYNECOLOGY | Facility: CLINIC | Age: 40
End: 2025-05-08
Payer: OTHER GOVERNMENT

## 2025-05-08 VITALS — DIASTOLIC BLOOD PRESSURE: 80 MMHG | WEIGHT: 186.4 LBS | SYSTOLIC BLOOD PRESSURE: 120 MMHG | BODY MASS INDEX: 29.19 KG/M2

## 2025-05-08 DIAGNOSIS — Z3A.28 28 WEEKS GESTATION OF PREGNANCY: ICD-10-CM

## 2025-05-08 DIAGNOSIS — Z34.93 PRENATAL CARE IN THIRD TRIMESTER, UNSPECIFIED GRAVIDITY: Primary | ICD-10-CM

## 2025-05-08 DIAGNOSIS — Z28.39 RUBELLA NON-IMMUNE STATUS, ANTEPARTUM: ICD-10-CM

## 2025-05-08 DIAGNOSIS — O09.899 RUBELLA NON-IMMUNE STATUS, ANTEPARTUM: ICD-10-CM

## 2025-05-08 DIAGNOSIS — O09.529 ANTEPARTUM MULTIGRAVIDA OF ADVANCED MATERNAL AGE: ICD-10-CM

## 2025-05-08 LAB
GLUCOSE UR STRIP-MCNC: NEGATIVE MG/DL
PROT UR STRIP-MCNC: NEGATIVE MG/DL

## 2025-05-08 NOTE — PROGRESS NOTES
OB FOLLOW UP  CC- Here for care of pregnancy        Natasha Beckett is a 40 y.o.  28w1d patient being seen today for her obstetrical follow up. Patient reports no complaints.     Patient undergoing Glucola testing today. She is due for her testing at 10:10.       MBT: A+  28 week packet: reviewed with patient , counseled on fetal movement , pediatrician list reviewed, breast pump discussed, and childbirth classes reviewed  TDAP: declines  Ultrasound Today: No  Does patient need tubal consent or  consent signed? no    Her prenatal care is complicated by (and status) : see below.  Patient Active Problem List   Diagnosis    Antepartum multigravida of advanced maternal age    Pregnancy    Rubella non-immune status, antepartum         ROS -   Patient Denies: Loss of Fluid, Vaginal Spotting, Vision Changes, Headaches, Contractions, Epigastric pain, and skin itching  Fetal Movement : normal  Other than what is documented in the HPI, all other systems reviewed and are negative.     The additional following portions of the patient's history were reviewed and updated as appropriate: allergies, current medications, past family history, past medical history, past social history, past surgical history, and problem list.    I have reviewed and agree with the HPI, ROS, and historical information as entered above. Ani Rosa MD      /80   Wt 84.6 kg (186 lb 6.4 oz)   LMP 10/25/2024   BMI 29.19 kg/m²         EXAM:     Prenatal Vitals  BP: 120/80  Weight: 84.6 kg (186 lb 6.4 oz)   Fetal Heart Rate: +      Fundal Height (cm): 27 cm        Urine Glucose Read-only: Negative  Urine Protein Read-only: Negative         Assessment and Plan    Problem List Items Addressed This Visit       Antepartum multigravida of advanced maternal age    Overview   [facility] Anatomy US WNL-Repeat growth @ 32 weeks  Baby aspirin 81mg daily @ 12 weeks  NIPT low risk, AFP neg           Pregnancy    Overview   2 prev  term          Rubella non-immune status, antepartum    Overview   Offer MMR postpartum          Other Visit Diagnoses         Prenatal care in third trimester, unspecified     -  Primary    Relevant Orders    POC Urinalysis Dipstick (Completed)    US Ob Follow Up Transabdominal Approach    CBC (No Diff)    Gestational Screen 1 Hr ([facility])    Antibody Screen    RPR, Rfx Qn RPR / Confirm TP            Pregnancy at 28w1d  1 hr Glucola, CBC, RPR. Antibody screen and TDAP declines  Fetal movement/PTL or Labor precautions  [facility] in 4 wks  Activity and Exercise discussed.  Return in about 1 month (around 2025) for F/U Prenatal.        Ani Rosa MD  2025

## 2025-05-09 LAB
BLD GP AB SCN SERPL QL: NEGATIVE
ERYTHROCYTE [DISTWIDTH] IN BLOOD BY AUTOMATED COUNT: 11.7 % (ref 12.3–15.4)
GLUCOSE 1H P 50 G GLC PO SERPL-MCNC: 100 MG/DL (ref 65–139)
HCT VFR BLD AUTO: 36 % (ref 34–46.6)
HGB BLD-MCNC: 12.3 G/DL (ref 12–15.9)
MCH RBC QN AUTO: 31.2 PG (ref 26.6–33)
MCHC RBC AUTO-ENTMCNC: 34.2 G/DL (ref 31.5–35.7)
MCV RBC AUTO: 91.4 FL (ref 79–97)
PLATELET # BLD AUTO: 208 10*3/MM3 (ref 140–450)
RBC # BLD AUTO: 3.94 10*6/MM3 (ref 3.77–5.28)
RPR SER QL: NON REACTIVE
WBC # BLD AUTO: 7.13 10*3/MM3 (ref 3.4–10.8)

## 2025-06-05 ENCOUNTER — HOSPITAL ENCOUNTER (OUTPATIENT)
Dept: WOMENS IMAGING | Facility: HOSPITAL | Age: 40
Discharge: HOME | End: 2025-06-05
Admitting: OBSTETRICS & GYNECOLOGY
Payer: OTHER GOVERNMENT

## 2025-06-05 ENCOUNTER — OFFICE VISIT (OUTPATIENT)
Dept: OBSTETRICS AND GYNECOLOGY | Facility: HOSPITAL | Age: 40
End: 2025-06-05
Payer: OTHER GOVERNMENT

## 2025-06-05 ENCOUNTER — ROUTINE PRENATAL (OUTPATIENT)
Dept: OBSTETRICS AND GYNECOLOGY | Facility: CLINIC | Age: 40
End: 2025-06-05
Payer: OTHER GOVERNMENT

## 2025-06-05 VITALS — SYSTOLIC BLOOD PRESSURE: 118 MMHG | BODY MASS INDEX: 29.84 KG/M2 | WEIGHT: 190.6 LBS | DIASTOLIC BLOOD PRESSURE: 76 MMHG

## 2025-06-05 VITALS — WEIGHT: 191 LBS | BODY MASS INDEX: 29.91 KG/M2 | SYSTOLIC BLOOD PRESSURE: 122 MMHG | DIASTOLIC BLOOD PRESSURE: 84 MMHG

## 2025-06-05 DIAGNOSIS — Z3A.32 32 WEEKS GESTATION OF PREGNANCY: ICD-10-CM

## 2025-06-05 DIAGNOSIS — Z34.93 PRENATAL CARE IN THIRD TRIMESTER, UNSPECIFIED GRAVIDITY: Primary | ICD-10-CM

## 2025-06-05 DIAGNOSIS — O09.529 ANTEPARTUM MULTIGRAVIDA OF ADVANCED MATERNAL AGE: ICD-10-CM

## 2025-06-05 DIAGNOSIS — O09.529 ANTEPARTUM MULTIGRAVIDA OF ADVANCED MATERNAL AGE: Primary | ICD-10-CM

## 2025-06-05 LAB
GLUCOSE UR STRIP-MCNC: NEGATIVE MG/DL
PROT UR STRIP-MCNC: NEGATIVE MG/DL

## 2025-06-05 PROCEDURE — 76819 FETAL BIOPHYS PROFIL W/O NST: CPT

## 2025-06-05 PROCEDURE — 76816 OB US FOLLOW-UP PER FETUS: CPT

## 2025-06-05 NOTE — PROGRESS NOTES
OB FOLLOW UP  CC- Here for care of pregnancy        Natasha Beckett is a 40 y.o.  32w1d patient being seen today for her obstetrical follow up visit. Patient reports no complaints.     Her prenatal care is complicated by (and status) :    Patient Active Problem List   Diagnosis    Antepartum multigravida of advanced maternal age    Pregnancy    Rubella non-immune status, antepartum         TDAP status: declines  Rhogam status: was not indicated  28 week labs: Reviewed and normal  Ultrasound Today: Yes at [facility], report pending  Non Stress Test: No.      ROS -   Patient Denies: Loss of Fluid, Vaginal Spotting, Vision Changes, Headaches, Nausea , Vomiting , Contractions, Epigastric pain, and skin itching  Fetal Movement : normal  Other than what is documented in the HPI, all other systems reviewed and are negative.     The additional following portions of the patient's history were reviewed and updated as appropriate: allergies, current medications, past family history, past medical history, past social history, past surgical history, and problem list.    I have reviewed and agree with the HPI, ROS, and historical information as entered above. Ani Rosa MD      /76   Wt 86.5 kg (190 lb 9.6 oz)   LMP 10/25/2024   BMI 29.84 kg/m²         EXAM:     Prenatal Vitals  BP: 118/76  Weight: 86.5 kg (190 lb 9.6 oz)   Fetal Heart Rate: + at [facility]               Urine Glucose Read-only: Negative  Urine Protein Read-only: Negative           Assessment and Plan    Problem List Items Addressed This Visit       Antepartum multigravida of advanced maternal age    Overview   [facility] Anatomy US WNL-Repeat growth @ 32 weeks  Baby aspirin 81mg daily @ 12 weeks  NIPT low risk, AFP neg           Pregnancy    Overview   2 prev  term          Other Visit Diagnoses         Prenatal care in third trimester, unspecified     -  Primary    Relevant Orders    POC Urinalysis Dipstick (Completed)             Pregnancy at 32w1d. [facility] Us today no report yet, but normal per patient. No FU sched.   Fetal status reassuring.  28 week labs reviewed.    Activity and Exercise discussed.  Fetal movement/PTL or Labor precautions  Return in about 2 weeks (around 6/19/2025) for F/U Prenatal.    Ani Rosa MD  06/05/2025

## 2025-06-05 NOTE — PROGRESS NOTES
Patient denies any leaking of fluid, vaginal bleeding, or contractions.  NIPT negative.  Patient reports next follow-up appointment with Dr. Rosa's office is today.

## 2025-06-11 NOTE — PROGRESS NOTES
Patient seen in  [facility] today for fetal ultrasound.    Please see ultrasound report under imaging tab of patient chart in Epic (Viewpoint report).    Nataly Gamboa MD

## 2025-06-23 ENCOUNTER — ROUTINE PRENATAL (OUTPATIENT)
Dept: OBSTETRICS AND GYNECOLOGY | Facility: CLINIC | Age: 40
End: 2025-06-23
Payer: OTHER GOVERNMENT

## 2025-06-23 VITALS — DIASTOLIC BLOOD PRESSURE: 76 MMHG | BODY MASS INDEX: 31.28 KG/M2 | SYSTOLIC BLOOD PRESSURE: 116 MMHG | WEIGHT: 199.8 LBS

## 2025-06-23 DIAGNOSIS — Z3A.34 34 WEEKS GESTATION OF PREGNANCY: ICD-10-CM

## 2025-06-23 DIAGNOSIS — O09.529 ANTEPARTUM MULTIGRAVIDA OF ADVANCED MATERNAL AGE: Primary | ICD-10-CM

## 2025-06-23 DIAGNOSIS — Z34.93 PRENATAL CARE IN THIRD TRIMESTER, UNSPECIFIED GRAVIDITY: ICD-10-CM

## 2025-06-23 LAB
GLUCOSE UR STRIP-MCNC: NEGATIVE MG/DL
PROT UR STRIP-MCNC: NEGATIVE MG/DL

## 2025-06-23 PROCEDURE — 0502F SUBSEQUENT PRENATAL CARE: CPT | Performed by: OBSTETRICS & GYNECOLOGY

## 2025-06-23 NOTE — PROGRESS NOTES
OB FOLLOW UP  CC- Here for care of pregnancy        Natasha Beckett is a 40 y.o.  34w5d patient being seen today for her obstetrical follow up visit. Patient reports nausea without vomiting and mild intermittent cramping.     Her prenatal care is complicated by (and status) : see below.  Patient Active Problem List   Diagnosis    Antepartum multigravida of advanced maternal age    Pregnancy    Rubella non-immune status, antepartum       Ultrasound Today: No  Non Stress Test: No.    ROS -   Patient Denies: Loss of Fluid, Vaginal Spotting, Vision Changes, Headaches, Contractions, Epigastric pain, and skin itching  Fetal Movement : normal  Other than what is documented in the HPI, all other systems reviewed and are negative.       The additional following portions of the patient's history were reviewed and updated as appropriate: allergies, current medications, past family history, past medical history, past social history, past surgical history, and problem list.    I have reviewed and agree with the HPI, ROS, and historical information as entered above. Ani Rosa MD      /76   Wt 90.6 kg (199 lb 12.8 oz)   LMP 10/25/2024   BMI 31.28 kg/m²       EXAM:     Prenatal Vitals  BP: 116/76  Weight: 90.6 kg (199 lb 12.8 oz)   Fetal Heart Rate: +      Fundal Height (cm): 33 cm        Urine Glucose Read-only: Negative  Urine Protein Read-only: Negative           Assessment and Plan    Problem List Items Addressed This Visit       Antepartum multigravida of advanced maternal age - Primary    Overview   PDC Anatomy US WNL-Repeat growth @ 32 weeks  Baby aspirin 81mg daily @ 12 weeks  NIPT low risk, AFP neg           Pregnancy    Overview   2 prev  term          Other Visit Diagnoses         Prenatal care in third trimester, unspecified         Relevant Orders    POC Urinalysis Dipstick (Completed)            Pregnancy at 34w5d  Desires spont labor.   Fetal status reassuring.    Activity and Exercise discussed.  Fetal movement/PTL or Labor precautions  GBS next visit  Return in about 2 weeks (around 7/7/2025) for F/U Prenatal.    Ani Rosa MD  06/23/2025

## 2025-07-03 ENCOUNTER — LAB (OUTPATIENT)
Dept: LAB | Facility: HOSPITAL | Age: 40
End: 2025-07-03
Payer: OTHER GOVERNMENT

## 2025-07-03 ENCOUNTER — ROUTINE PRENATAL (OUTPATIENT)
Dept: OBSTETRICS AND GYNECOLOGY | Facility: CLINIC | Age: 40
End: 2025-07-03
Payer: OTHER GOVERNMENT

## 2025-07-03 VITALS — BODY MASS INDEX: 31.35 KG/M2 | DIASTOLIC BLOOD PRESSURE: 76 MMHG | SYSTOLIC BLOOD PRESSURE: 118 MMHG | WEIGHT: 200.2 LBS

## 2025-07-03 DIAGNOSIS — Z34.93 PRENATAL CARE IN THIRD TRIMESTER, UNSPECIFIED GRAVIDITY: Primary | ICD-10-CM

## 2025-07-03 DIAGNOSIS — Z34.90 PREGNANCY, UNSPECIFIED GESTATIONAL AGE: ICD-10-CM

## 2025-07-03 DIAGNOSIS — Z28.39 RUBELLA NON-IMMUNE STATUS, ANTEPARTUM: ICD-10-CM

## 2025-07-03 DIAGNOSIS — O09.529 ANTEPARTUM MULTIGRAVIDA OF ADVANCED MATERNAL AGE: ICD-10-CM

## 2025-07-03 DIAGNOSIS — O09.899 RUBELLA NON-IMMUNE STATUS, ANTEPARTUM: ICD-10-CM

## 2025-07-03 LAB
GLUCOSE UR STRIP-MCNC: NEGATIVE MG/DL
PROT UR STRIP-MCNC: NEGATIVE MG/DL

## 2025-07-03 PROCEDURE — 87081 CULTURE SCREEN ONLY: CPT

## 2025-07-03 NOTE — PROGRESS NOTES
OB FOLLOW UP  CC- Here for care of pregnancy        Natasha Beckett is a 40 y.o.  36w1d patient being seen today for her obstetrical follow up visit. Patient reports occasional nausea without vomiting. She also reports swelling in lower extremities without pitting. She has been wearing compression socks with improvement. .     Her prenatal care is complicated by (and status) : see below.  Patient Active Problem List   Diagnosis    Antepartum multigravida of advanced maternal age    Pregnancy    Rubella non-immune status, antepartum       GBS Status: Done Today. She is not allergic to PCN.    No Known Allergies       Her Delivery Plan is: Does not desire IOL    US today: no  Non Stress Test: No.    ROS -   Patient Denies: Loss of Fluid, Vaginal Spotting, Vision Changes, Headaches, Contractions, Epigastric pain, and skin itching  Fetal Movement : normal  Other than what is documented in the HPI, all other systems reviewed and are negative.       The additional following portions of the patient's history were reviewed and updated as appropriate: allergies, current medications, past family history, past medical history, past social history, past surgical history, and problem list.    I have reviewed and agree with the HPI, ROS, and historical information as entered above. Ani Rosa MD        EXAM:     Prenatal Vitals  BP: 118/76  Weight: 90.8 kg (200 lb 3.2 oz)   Fetal Heart Rate: +              Urine Glucose Read-only: Negative  Urine Protein Read-only: Negative           Assessment and Plan    Problem List Items Addressed This Visit       Antepartum multigravida of advanced maternal age    Overview   PDC Anatomy US WNL-Repeat growth @ 32 weeks  Baby aspirin 81mg daily @ 12 weeks  NIPT low risk, AFP neg           Pregnancy    Overview   2 prev  term         Rubella non-immune status, antepartum    Overview   Offer MMR postpartum          Other Visit Diagnoses         Prenatal care in third  trimester, unspecified     -  Primary    Relevant Orders    Group B Streptococcus Culture - Swab, Vaginal/Rectum    POC Urinalysis Dipstick (Completed)            Pregnancy at 36w1d  Fetal status reassuring.   Reviewed Pre-eclampsia signs/symptoms  Discussed options for IOL. Patient desires spontaneous labor. Would like to postpone an IOL unless medically indicated.   Delivery options reviewed with patient  Signs of labor reviewed  Kick counts reviewed  Activity and Exercise discussed.  Return in about 1 week (around 7/10/2025) for F/U Prenatal.    Ani Rosa MD  2025

## 2025-07-06 LAB — BACTERIA SPEC AEROBE CULT: NORMAL

## 2025-07-10 ENCOUNTER — ROUTINE PRENATAL (OUTPATIENT)
Dept: OBSTETRICS AND GYNECOLOGY | Facility: CLINIC | Age: 40
End: 2025-07-10
Payer: OTHER GOVERNMENT

## 2025-07-10 VITALS — BODY MASS INDEX: 31.35 KG/M2 | DIASTOLIC BLOOD PRESSURE: 80 MMHG | SYSTOLIC BLOOD PRESSURE: 122 MMHG | WEIGHT: 200.2 LBS

## 2025-07-10 DIAGNOSIS — Z3A.37 37 WEEKS GESTATION OF PREGNANCY: ICD-10-CM

## 2025-07-10 DIAGNOSIS — Z34.93 PRENATAL CARE IN THIRD TRIMESTER, UNSPECIFIED GRAVIDITY: Primary | ICD-10-CM

## 2025-07-10 DIAGNOSIS — Z28.39 RUBELLA NON-IMMUNE STATUS, ANTEPARTUM: ICD-10-CM

## 2025-07-10 DIAGNOSIS — O09.899 RUBELLA NON-IMMUNE STATUS, ANTEPARTUM: ICD-10-CM

## 2025-07-10 DIAGNOSIS — O09.529 ANTEPARTUM MULTIGRAVIDA OF ADVANCED MATERNAL AGE: ICD-10-CM

## 2025-07-10 LAB
GLUCOSE UR STRIP-MCNC: NEGATIVE MG/DL
PROT UR STRIP-MCNC: NEGATIVE MG/DL

## 2025-07-10 NOTE — PROGRESS NOTES
OB FOLLOW UP  CC- Here for care of pregnancy        Natasha Beckett is a 40 y.o.  37w1d patient being seen today for her obstetrical follow up visit. Patient reports dull ache in lower back. Denies any urinary symptoms.      Her prenatal care is complicated by (and status) :   Patient Active Problem List   Diagnosis    Antepartum multigravida of advanced maternal age    Pregnancy    Rubella non-immune status, antepartum       GBS Status:   Group B Strep Culture   Date Value Ref Range Status   2025 No Group B Streptococcus isolated  Final         No Known Allergies         Her Delivery Plan is: Does not desire IOL    US today: no  Non Stress Test: No.    ROS -   Patient Denies: Loss of Fluid, Vaginal Spotting, Vision Changes, Headaches, Nausea , Vomiting , Contractions, Epigastric pain, and skin itching  Fetal Movement : normal  Other than what is documented in the HPI, all other systems reviewed and are negative.       The additional following portions of the patient's history were reviewed and updated as appropriate: allergies, current medications, past family history, past medical history, past social history, past surgical history, and problem list.    I have reviewed and agree with the HPI, ROS, and historical information as entered above. Ani Rosa MD        EXAM:     Prenatal Vitals  BP: 122/80  Weight: 90.8 kg (200 lb 3.2 oz)   Fetal Heart Rate: +              Urine Glucose Read-only: Negative  Urine Protein Read-only: Negative           Assessment and Plan    Problem List Items Addressed This Visit       Antepartum multigravida of advanced maternal age    Overview   PDC Anatomy US WNL-Repeat growth @ 32 weeks  Baby aspirin 81mg daily @ 12 weeks  NIPT low risk, AFP neg           Pregnancy    Overview   2 prev  term         Rubella non-immune status, antepartum    Overview   Offer MMR postpartum          Other Visit Diagnoses         Prenatal care in third trimester,  unspecified     -  Primary    Relevant Orders    POC Urinalysis Dipstick (Completed)            Pregnancy at 37w1d  Fetal status reassuring.   Reviewed Pre-eclampsia signs/symptoms  Discussed options for IOL. Patient desires spontaneous labor. Would like to postpone an IOL unless medically indicated.   Delivery options reviewed with patient  Signs of labor reviewed  Kick counts reviewed  Activity and Exercise discussed.  Return in about 1 week (around 2025) for F/U Prenatal.    Ani Rosa MD  07/10/2025

## 2025-07-17 ENCOUNTER — ROUTINE PRENATAL (OUTPATIENT)
Dept: OBSTETRICS AND GYNECOLOGY | Facility: CLINIC | Age: 40
End: 2025-07-17
Payer: OTHER GOVERNMENT

## 2025-07-17 VITALS — SYSTOLIC BLOOD PRESSURE: 124 MMHG | WEIGHT: 203 LBS | DIASTOLIC BLOOD PRESSURE: 82 MMHG | BODY MASS INDEX: 31.78 KG/M2

## 2025-07-17 DIAGNOSIS — O09.529 ANTEPARTUM MULTIGRAVIDA OF ADVANCED MATERNAL AGE: Primary | ICD-10-CM

## 2025-07-17 LAB
GLUCOSE UR STRIP-MCNC: NEGATIVE MG/DL
PROT UR STRIP-MCNC: NEGATIVE MG/DL

## 2025-07-17 NOTE — PROGRESS NOTES
OB FOLLOW UP  CC- Here for care of pregnancy        Natasha Beckett is a 40 y.o.  38w1d patient being seen today for her obstetrical follow up visit. Patient reports cramping that started his morning. Has also had an increase in clear mucus discharge.       Her prenatal care is complicated by (and status) :   Patient Active Problem List   Diagnosis    Antepartum multigravida of advanced maternal age    Pregnancy    Rubella non-immune status, antepartum       GBS Status:   Group B Strep Culture   Date Value Ref Range Status   2025 No Group B Streptococcus isolated  Final         No Known Allergies         Her Delivery Plan is: Does not desire IOL    US today: no  Non Stress Test: No.    ROS -   Patient Denies: Loss of Fluid, Vaginal Spotting, Vision Changes, Headaches, Nausea , Vomiting , Contractions, Epigastric pain, and skin itching  Fetal Movement : normal  Other than what is documented in the HPI, all other systems reviewed and are negative.       The additional following portions of the patient's history were reviewed and updated as appropriate: allergies, current medications, past family history, past medical history, past social history, past surgical history, and problem list.    I have reviewed and agree with the HPI, ROS, and historical information as entered above. Sarah Valle, APRN        EXAM:     Prenatal Vitals  BP: 124/82  Weight: 92.1 kg (203 lb)   Fetal Heart Rate: 130              Urine Glucose Read-only: Negative  Urine Protein Read-only: Negative           Assessment and Plan    Problem List Items Addressed This Visit          Gravid and     Antepartum multigravida of advanced maternal age - Primary    Overview   PDC Anatomy US WNL-Repeat growth @ 32 weeks  Baby aspirin 81mg daily @ 12 weeks  NIPT low risk, AFP neg           Relevant Orders    POC Urinalysis Dipstick (Completed)       Pregnancy at 38w1d  Fetal status reassuring.   Reviewed Pre-eclampsia  signs/symptoms  Discussed options for IOL. Patient desires spontaneous labor. Would like to postpone an IOL unless medically indicated.   Delivery options reviewed with patient  Signs of labor reviewed  Kick counts reviewed  Activity and Exercise discussed.  Follow up in one week    Sarah Valle, APRN  07/17/2025

## 2025-07-18 ENCOUNTER — ANESTHESIA EVENT (OUTPATIENT)
Dept: LABOR AND DELIVERY | Facility: HOSPITAL | Age: 40
End: 2025-07-18
Payer: OTHER GOVERNMENT

## 2025-07-18 ENCOUNTER — HOSPITAL ENCOUNTER (INPATIENT)
Facility: HOSPITAL | Age: 40
LOS: 2 days | Discharge: HOME | End: 2025-07-20
Attending: OBSTETRICS & GYNECOLOGY | Admitting: OBSTETRICS & GYNECOLOGY
Payer: OTHER GOVERNMENT

## 2025-07-18 ENCOUNTER — ANESTHESIA (OUTPATIENT)
Dept: LABOR AND DELIVERY | Facility: HOSPITAL | Age: 40
End: 2025-07-18
Payer: OTHER GOVERNMENT

## 2025-07-18 PROBLEM — Z37.9 NORMAL LABOR: Status: ACTIVE | Noted: 2025-07-18

## 2025-07-18 LAB
ABO GROUP BLD: NORMAL
ALP SERPL-CCNC: 106 U/L (ref 39–117)
ALT SERPL W P-5'-P-CCNC: 13 U/L (ref 1–33)
AST SERPL-CCNC: 17 U/L (ref 1–32)
BILIRUB SERPL-MCNC: 0.4 MG/DL (ref 0–1.2)
BLD GP AB SCN SERPL QL: NEGATIVE
CREAT SERPL-MCNC: 0.51 MG/DL (ref 0.57–1)
DEPRECATED RDW RBC AUTO: 40.1 FL (ref 37–54)
ERYTHROCYTE [DISTWIDTH] IN BLOOD BY AUTOMATED COUNT: 12.6 % (ref 12.3–15.4)
HCT VFR BLD AUTO: 38.5 % (ref 34–46.6)
HGB BLD-MCNC: 13.2 G/DL (ref 12–15.9)
LDH SERPL-CCNC: 171 U/L (ref 135–214)
MCH RBC QN AUTO: 30 PG (ref 26.6–33)
MCHC RBC AUTO-ENTMCNC: 34.3 G/DL (ref 31.5–35.7)
MCV RBC AUTO: 87.5 FL (ref 79–97)
PLATELET # BLD AUTO: 179 10*3/MM3 (ref 140–450)
PMV BLD AUTO: 11.3 FL (ref 6–12)
RBC # BLD AUTO: 4.4 10*6/MM3 (ref 3.77–5.28)
RH BLD: POSITIVE
T&S EXPIRATION DATE: NORMAL
URATE SERPL-MCNC: 4.9 MG/DL (ref 2.4–5.7)
WBC NRBC COR # BLD AUTO: 10.93 10*3/MM3 (ref 3.4–10.8)

## 2025-07-18 PROCEDURE — 86901 BLOOD TYPING SEROLOGIC RH(D): CPT | Performed by: OBSTETRICS & GYNECOLOGY

## 2025-07-18 PROCEDURE — 85027 COMPLETE CBC AUTOMATED: CPT | Performed by: OBSTETRICS & GYNECOLOGY

## 2025-07-18 PROCEDURE — 25010000002 ONDANSETRON PER 1 MG: Performed by: OBSTETRICS & GYNECOLOGY

## 2025-07-18 PROCEDURE — 82247 BILIRUBIN TOTAL: CPT | Performed by: OBSTETRICS & GYNECOLOGY

## 2025-07-18 PROCEDURE — 86780 TREPONEMA PALLIDUM: CPT | Performed by: OBSTETRICS & GYNECOLOGY

## 2025-07-18 PROCEDURE — 82565 ASSAY OF CREATININE: CPT | Performed by: OBSTETRICS & GYNECOLOGY

## 2025-07-18 PROCEDURE — 86850 RBC ANTIBODY SCREEN: CPT | Performed by: OBSTETRICS & GYNECOLOGY

## 2025-07-18 PROCEDURE — 99202 OFFICE O/P NEW SF 15 MIN: CPT | Performed by: OBSTETRICS & GYNECOLOGY

## 2025-07-18 PROCEDURE — 86900 BLOOD TYPING SEROLOGIC ABO: CPT | Performed by: OBSTETRICS & GYNECOLOGY

## 2025-07-18 PROCEDURE — 25010000002 LIDOCAINE-EPINEPHRINE (PF) 1.5 %-1:200000 SOLUTION: Performed by: ANESTHESIOLOGY

## 2025-07-18 PROCEDURE — 84450 TRANSFERASE (AST) (SGOT): CPT | Performed by: OBSTETRICS & GYNECOLOGY

## 2025-07-18 PROCEDURE — 83615 LACTATE (LD) (LDH) ENZYME: CPT | Performed by: OBSTETRICS & GYNECOLOGY

## 2025-07-18 PROCEDURE — 84075 ASSAY ALKALINE PHOSPHATASE: CPT | Performed by: OBSTETRICS & GYNECOLOGY

## 2025-07-18 PROCEDURE — 84460 ALANINE AMINO (ALT) (SGPT): CPT | Performed by: OBSTETRICS & GYNECOLOGY

## 2025-07-18 PROCEDURE — C1755 CATHETER, INTRASPINAL: HCPCS | Performed by: ANESTHESIOLOGY

## 2025-07-18 PROCEDURE — 59025 FETAL NON-STRESS TEST: CPT

## 2025-07-18 PROCEDURE — 25010000002 ROPIVACAINE PER 1 MG: Performed by: ANESTHESIOLOGY

## 2025-07-18 PROCEDURE — 84550 ASSAY OF BLOOD/URIC ACID: CPT | Performed by: OBSTETRICS & GYNECOLOGY

## 2025-07-18 PROCEDURE — 51702 INSERT TEMP BLADDER CATH: CPT

## 2025-07-18 PROCEDURE — 0HQ9XZZ REPAIR PERINEUM SKIN, EXTERNAL APPROACH: ICD-10-PCS | Performed by: OBSTETRICS & GYNECOLOGY

## 2025-07-18 PROCEDURE — 25010000002 FENTANYL CITRATE (PF) 50 MCG/ML SOLUTION: Performed by: ANESTHESIOLOGY

## 2025-07-18 RX ORDER — CITRIC ACID/SODIUM CITRATE 334-500MG
30 SOLUTION, ORAL ORAL ONCE
Status: DISCONTINUED | OUTPATIENT
Start: 2025-07-18 | End: 2025-07-19 | Stop reason: HOSPADM

## 2025-07-18 RX ORDER — OXYTOCIN/0.9 % SODIUM CHLORIDE 30/500 ML
999 PLASTIC BAG, INJECTION (ML) INTRAVENOUS ONCE
Status: DISCONTINUED | OUTPATIENT
Start: 2025-07-18 | End: 2025-07-19 | Stop reason: HOSPADM

## 2025-07-18 RX ORDER — SODIUM CHLORIDE, SODIUM LACTATE, POTASSIUM CHLORIDE, CALCIUM CHLORIDE 600; 310; 30; 20 MG/100ML; MG/100ML; MG/100ML; MG/100ML
125 INJECTION, SOLUTION INTRAVENOUS CONTINUOUS
Status: DISCONTINUED | OUTPATIENT
Start: 2025-07-18 | End: 2025-07-19

## 2025-07-18 RX ORDER — METOCLOPRAMIDE HYDROCHLORIDE 5 MG/ML
10 INJECTION INTRAMUSCULAR; INTRAVENOUS ONCE AS NEEDED
Status: DISCONTINUED | OUTPATIENT
Start: 2025-07-18 | End: 2025-07-19 | Stop reason: HOSPADM

## 2025-07-18 RX ORDER — ONDANSETRON 4 MG/1
4 TABLET, ORALLY DISINTEGRATING ORAL EVERY 6 HOURS PRN
Status: DISCONTINUED | OUTPATIENT
Start: 2025-07-18 | End: 2025-07-19 | Stop reason: HOSPADM

## 2025-07-18 RX ORDER — ROPIVACAINE HYDROCHLORIDE 5 MG/ML
INJECTION, SOLUTION EPIDURAL; INFILTRATION; PERINEURAL AS NEEDED
Status: DISCONTINUED | OUTPATIENT
Start: 2025-07-18 | End: 2025-07-18 | Stop reason: SURG

## 2025-07-18 RX ORDER — TERBUTALINE SULFATE 1 MG/ML
0.25 INJECTION SUBCUTANEOUS AS NEEDED
Status: DISCONTINUED | OUTPATIENT
Start: 2025-07-18 | End: 2025-07-19 | Stop reason: HOSPADM

## 2025-07-18 RX ORDER — ONDANSETRON 2 MG/ML
4 INJECTION INTRAMUSCULAR; INTRAVENOUS EVERY 6 HOURS PRN
Status: DISCONTINUED | OUTPATIENT
Start: 2025-07-18 | End: 2025-07-19 | Stop reason: HOSPADM

## 2025-07-18 RX ORDER — ACETAMINOPHEN 325 MG/1
650 TABLET ORAL EVERY 4 HOURS PRN
Status: DISCONTINUED | OUTPATIENT
Start: 2025-07-18 | End: 2025-07-19 | Stop reason: HOSPADM

## 2025-07-18 RX ORDER — OXYTOCIN/0.9 % SODIUM CHLORIDE 30/500 ML
PLASTIC BAG, INJECTION (ML) INTRAVENOUS
Status: COMPLETED
Start: 2025-07-18 | End: 2025-07-18

## 2025-07-18 RX ORDER — SODIUM CHLORIDE 9 MG/ML
40 INJECTION, SOLUTION INTRAVENOUS AS NEEDED
Status: DISCONTINUED | OUTPATIENT
Start: 2025-07-18 | End: 2025-07-19 | Stop reason: HOSPADM

## 2025-07-18 RX ORDER — SODIUM CHLORIDE 0.9 % (FLUSH) 0.9 %
10 SYRINGE (ML) INJECTION EVERY 12 HOURS SCHEDULED
Status: DISCONTINUED | OUTPATIENT
Start: 2025-07-18 | End: 2025-07-19 | Stop reason: HOSPADM

## 2025-07-18 RX ORDER — FAMOTIDINE 20 MG/1
20 TABLET, FILM COATED ORAL EVERY 12 HOURS SCHEDULED
Status: DISCONTINUED | OUTPATIENT
Start: 2025-07-18 | End: 2025-07-19 | Stop reason: HOSPADM

## 2025-07-18 RX ORDER — SODIUM CHLORIDE 0.9 % (FLUSH) 0.9 %
10 SYRINGE (ML) INJECTION AS NEEDED
Status: DISCONTINUED | OUTPATIENT
Start: 2025-07-18 | End: 2025-07-19 | Stop reason: HOSPADM

## 2025-07-18 RX ORDER — FENTANYL CITRATE 50 UG/ML
INJECTION, SOLUTION INTRAMUSCULAR; INTRAVENOUS AS NEEDED
Status: DISCONTINUED | OUTPATIENT
Start: 2025-07-18 | End: 2025-07-18 | Stop reason: SURG

## 2025-07-18 RX ORDER — METHYLERGONOVINE MALEATE 0.2 MG/ML
200 INJECTION INTRAVENOUS ONCE AS NEEDED
Status: DISCONTINUED | OUTPATIENT
Start: 2025-07-18 | End: 2025-07-19 | Stop reason: HOSPADM

## 2025-07-18 RX ORDER — FAMOTIDINE 10 MG/ML
20 INJECTION, SOLUTION INTRAVENOUS EVERY 12 HOURS SCHEDULED
Status: DISCONTINUED | OUTPATIENT
Start: 2025-07-18 | End: 2025-07-19 | Stop reason: HOSPADM

## 2025-07-18 RX ORDER — OXYTOCIN/0.9 % SODIUM CHLORIDE 30/500 ML
250 PLASTIC BAG, INJECTION (ML) INTRAVENOUS CONTINUOUS
Status: DISCONTINUED | OUTPATIENT
Start: 2025-07-19 | End: 2025-07-19

## 2025-07-18 RX ORDER — DIPHENHYDRAMINE HYDROCHLORIDE 50 MG/ML
12.5 INJECTION, SOLUTION INTRAMUSCULAR; INTRAVENOUS EVERY 8 HOURS PRN
Status: DISCONTINUED | OUTPATIENT
Start: 2025-07-18 | End: 2025-07-19 | Stop reason: HOSPADM

## 2025-07-18 RX ORDER — ONDANSETRON 2 MG/ML
4 INJECTION INTRAMUSCULAR; INTRAVENOUS ONCE AS NEEDED
Status: DISCONTINUED | OUTPATIENT
Start: 2025-07-18 | End: 2025-07-19 | Stop reason: HOSPADM

## 2025-07-18 RX ORDER — ACETAMINOPHEN 325 MG/1
975 TABLET ORAL EVERY 6 HOURS PRN
Status: DISCONTINUED | OUTPATIENT
Start: 2025-07-18 | End: 2025-07-19 | Stop reason: HOSPADM

## 2025-07-18 RX ORDER — CARBOPROST TROMETHAMINE 250 UG/ML
250 INJECTION, SOLUTION INTRAMUSCULAR
Status: DISCONTINUED | OUTPATIENT
Start: 2025-07-18 | End: 2025-07-19 | Stop reason: HOSPADM

## 2025-07-18 RX ORDER — EPHEDRINE SULFATE 5 MG/ML
10 INJECTION INTRAVENOUS
Status: DISCONTINUED | OUTPATIENT
Start: 2025-07-18 | End: 2025-07-19 | Stop reason: HOSPADM

## 2025-07-18 RX ORDER — MISOPROSTOL 200 UG/1
800 TABLET ORAL ONCE AS NEEDED
Status: DISCONTINUED | OUTPATIENT
Start: 2025-07-18 | End: 2025-07-19 | Stop reason: HOSPADM

## 2025-07-18 RX ORDER — IBUPROFEN 600 MG/1
600 TABLET, FILM COATED ORAL EVERY 6 HOURS PRN
Status: DISCONTINUED | OUTPATIENT
Start: 2025-07-18 | End: 2025-07-19 | Stop reason: HOSPADM

## 2025-07-18 RX ORDER — ROPIVACAINE HYDROCHLORIDE 2 MG/ML
15 INJECTION, SOLUTION EPIDURAL; INFILTRATION; PERINEURAL CONTINUOUS
Status: DISCONTINUED | OUTPATIENT
Start: 2025-07-18 | End: 2025-07-19

## 2025-07-18 RX ORDER — LIDOCAINE HYDROCHLORIDE AND EPINEPHRINE 15; 5 MG/ML; UG/ML
INJECTION, SOLUTION EPIDURAL AS NEEDED
Status: DISCONTINUED | OUTPATIENT
Start: 2025-07-18 | End: 2025-07-18 | Stop reason: SURG

## 2025-07-18 RX ORDER — CITRIC ACID/SODIUM CITRATE 334-500MG
30 SOLUTION, ORAL ORAL ONCE AS NEEDED
Status: DISCONTINUED | OUTPATIENT
Start: 2025-07-18 | End: 2025-07-19 | Stop reason: HOSPADM

## 2025-07-18 RX ORDER — LIDOCAINE HYDROCHLORIDE 10 MG/ML
0.5 INJECTION, SOLUTION EPIDURAL; INFILTRATION; INTRACAUDAL; PERINEURAL ONCE AS NEEDED
Status: DISCONTINUED | OUTPATIENT
Start: 2025-07-18 | End: 2025-07-19 | Stop reason: HOSPADM

## 2025-07-18 RX ORDER — FAMOTIDINE 10 MG/ML
20 INJECTION, SOLUTION INTRAVENOUS ONCE AS NEEDED
Status: DISCONTINUED | OUTPATIENT
Start: 2025-07-18 | End: 2025-07-19 | Stop reason: HOSPADM

## 2025-07-18 RX ORDER — MAGNESIUM CARB/ALUMINUM HYDROX 105-160MG
30 TABLET,CHEWABLE ORAL ONCE
Status: DISCONTINUED | OUTPATIENT
Start: 2025-07-18 | End: 2025-07-19 | Stop reason: HOSPADM

## 2025-07-18 RX ADMIN — Medication 30 UNITS: at 22:45

## 2025-07-18 RX ADMIN — ONDANSETRON 4 MG: 2 INJECTION, SOLUTION INTRAMUSCULAR; INTRAVENOUS at 20:50

## 2025-07-18 RX ADMIN — LIDOCAINE HYDROCHLORIDE AND EPINEPHRINE 2 ML: 15; 5 INJECTION, SOLUTION EPIDURAL at 21:06

## 2025-07-18 RX ADMIN — ROPIVACAINE HYDROCHLORIDE 7 ML: 5 INJECTION, SOLUTION EPIDURAL; INFILTRATION; PERINEURAL at 21:09

## 2025-07-18 RX ADMIN — ROPIVACAINE HYDROCHLORIDE 15 ML/HR: 2 INJECTION, SOLUTION EPIDURAL; INFILTRATION at 21:12

## 2025-07-18 RX ADMIN — LIDOCAINE HYDROCHLORIDE AND EPINEPHRINE 3 ML: 15; 5 INJECTION, SOLUTION EPIDURAL at 21:03

## 2025-07-18 RX ADMIN — FENTANYL CITRATE 100 MCG: 50 INJECTION, SOLUTION INTRAMUSCULAR; INTRAVENOUS at 21:09

## 2025-07-19 LAB
BASOPHILS # BLD AUTO: 0.02 10*3/MM3 (ref 0–0.2)
BASOPHILS NFR BLD AUTO: 0.2 % (ref 0–1.5)
DEPRECATED RDW RBC AUTO: 41.8 FL (ref 37–54)
EOSINOPHIL # BLD AUTO: 0.07 10*3/MM3 (ref 0–0.4)
EOSINOPHIL NFR BLD AUTO: 0.7 % (ref 0.3–6.2)
ERYTHROCYTE [DISTWIDTH] IN BLOOD BY AUTOMATED COUNT: 12.8 % (ref 12.3–15.4)
HCT VFR BLD AUTO: 36.1 % (ref 34–46.6)
HGB BLD-MCNC: 12.2 G/DL (ref 12–15.9)
IMM GRANULOCYTES # BLD AUTO: 0.04 10*3/MM3 (ref 0–0.05)
IMM GRANULOCYTES NFR BLD AUTO: 0.4 % (ref 0–0.5)
LYMPHOCYTES # BLD AUTO: 1.9 10*3/MM3 (ref 0.7–3.1)
LYMPHOCYTES NFR BLD AUTO: 18.4 % (ref 19.6–45.3)
MCH RBC QN AUTO: 30 PG (ref 26.6–33)
MCHC RBC AUTO-ENTMCNC: 33.8 G/DL (ref 31.5–35.7)
MCV RBC AUTO: 88.9 FL (ref 79–97)
MONOCYTES # BLD AUTO: 1.07 10*3/MM3 (ref 0.1–0.9)
MONOCYTES NFR BLD AUTO: 10.3 % (ref 5–12)
NEUTROPHILS NFR BLD AUTO: 7.25 10*3/MM3 (ref 1.7–7)
NEUTROPHILS NFR BLD AUTO: 70 % (ref 42.7–76)
NRBC BLD AUTO-RTO: 0 /100 WBC (ref 0–0.2)
PLATELET # BLD AUTO: 177 10*3/MM3 (ref 140–450)
PMV BLD AUTO: 11.2 FL (ref 6–12)
RBC # BLD AUTO: 4.06 10*6/MM3 (ref 3.77–5.28)
TREPONEMA PALLIDUM IGG+IGM AB [PRESENCE] IN SERUM OR PLASMA BY IMMUNOASSAY: NORMAL
WBC NRBC COR # BLD AUTO: 10.35 10*3/MM3 (ref 3.4–10.8)

## 2025-07-19 PROCEDURE — 85025 COMPLETE CBC W/AUTO DIFF WBC: CPT | Performed by: OBSTETRICS & GYNECOLOGY

## 2025-07-19 RX ORDER — ACETAMINOPHEN 325 MG/1
650 TABLET ORAL EVERY 6 HOURS PRN
Status: DISCONTINUED | OUTPATIENT
Start: 2025-07-19 | End: 2025-07-20 | Stop reason: HOSPADM

## 2025-07-19 RX ORDER — SODIUM CHLORIDE 0.9 % (FLUSH) 0.9 %
1-10 SYRINGE (ML) INJECTION AS NEEDED
Status: DISCONTINUED | OUTPATIENT
Start: 2025-07-19 | End: 2025-07-20 | Stop reason: HOSPADM

## 2025-07-19 RX ORDER — DIPHENHYDRAMINE HCL 25 MG
25 CAPSULE ORAL NIGHTLY PRN
Status: DISCONTINUED | OUTPATIENT
Start: 2025-07-19 | End: 2025-07-20 | Stop reason: HOSPADM

## 2025-07-19 RX ORDER — OXYCODONE HYDROCHLORIDE 10 MG/1
10 TABLET ORAL EVERY 4 HOURS PRN
Status: DISCONTINUED | OUTPATIENT
Start: 2025-07-19 | End: 2025-07-20 | Stop reason: HOSPADM

## 2025-07-19 RX ORDER — ONDANSETRON 4 MG/1
4 TABLET, ORALLY DISINTEGRATING ORAL EVERY 6 HOURS PRN
Status: DISCONTINUED | OUTPATIENT
Start: 2025-07-19 | End: 2025-07-20 | Stop reason: HOSPADM

## 2025-07-19 RX ORDER — POLYETHYLENE GLYCOL 3350 17 G/17G
17 POWDER, FOR SOLUTION ORAL DAILY
Status: DISCONTINUED | OUTPATIENT
Start: 2025-07-19 | End: 2025-07-20 | Stop reason: HOSPADM

## 2025-07-19 RX ORDER — IBUPROFEN 600 MG/1
600 TABLET, FILM COATED ORAL EVERY 6 HOURS PRN
Status: DISCONTINUED | OUTPATIENT
Start: 2025-07-19 | End: 2025-07-20 | Stop reason: HOSPADM

## 2025-07-19 RX ORDER — HYDROCORTISONE 25 MG/G
1 CREAM TOPICAL AS NEEDED
Status: DISCONTINUED | OUTPATIENT
Start: 2025-07-19 | End: 2025-07-20 | Stop reason: HOSPADM

## 2025-07-19 RX ORDER — PRENATAL VIT/IRON FUM/FOLIC AC 27MG-0.8MG
1 TABLET ORAL DAILY
Status: DISCONTINUED | OUTPATIENT
Start: 2025-07-19 | End: 2025-07-20 | Stop reason: HOSPADM

## 2025-07-19 RX ORDER — OXYCODONE HYDROCHLORIDE 5 MG/1
5 TABLET ORAL EVERY 4 HOURS PRN
Status: DISCONTINUED | OUTPATIENT
Start: 2025-07-19 | End: 2025-07-20 | Stop reason: HOSPADM

## 2025-07-19 RX ORDER — ONDANSETRON 2 MG/ML
4 INJECTION INTRAMUSCULAR; INTRAVENOUS EVERY 6 HOURS PRN
Status: DISCONTINUED | OUTPATIENT
Start: 2025-07-19 | End: 2025-07-20 | Stop reason: HOSPADM

## 2025-07-19 RX ORDER — CALCIUM CARBONATE 500 MG/1
1 TABLET, CHEWABLE ORAL 3 TIMES DAILY PRN
Status: DISCONTINUED | OUTPATIENT
Start: 2025-07-19 | End: 2025-07-20 | Stop reason: HOSPADM

## 2025-07-19 RX ORDER — DOCUSATE SODIUM 100 MG/1
100 CAPSULE, LIQUID FILLED ORAL 2 TIMES DAILY
Status: DISCONTINUED | OUTPATIENT
Start: 2025-07-19 | End: 2025-07-20 | Stop reason: HOSPADM

## 2025-07-19 RX ORDER — SIMETHICONE 80 MG
80 TABLET,CHEWABLE ORAL 4 TIMES DAILY
Status: DISCONTINUED | OUTPATIENT
Start: 2025-07-19 | End: 2025-07-20 | Stop reason: HOSPADM

## 2025-07-19 RX ORDER — BISACODYL 10 MG
10 SUPPOSITORY, RECTAL RECTAL DAILY PRN
Status: DISCONTINUED | OUTPATIENT
Start: 2025-07-19 | End: 2025-07-20 | Stop reason: HOSPADM

## 2025-07-19 RX ORDER — OXYTOCIN/0.9 % SODIUM CHLORIDE 30/500 ML
125 PLASTIC BAG, INJECTION (ML) INTRAVENOUS CONTINUOUS PRN
Status: DISCONTINUED | OUTPATIENT
Start: 2025-07-19 | End: 2025-07-20 | Stop reason: HOSPADM

## 2025-07-19 RX ADMIN — IBUPROFEN 600 MG: 600 TABLET ORAL at 08:26

## 2025-07-19 RX ADMIN — IBUPROFEN 600 MG: 600 TABLET ORAL at 14:31

## 2025-07-19 RX ADMIN — Medication: at 02:36

## 2025-07-19 RX ADMIN — IBUPROFEN 600 MG: 600 TABLET ORAL at 20:41

## 2025-07-19 RX ADMIN — WITCH HAZEL 1 PAD: 500 SOLUTION RECTAL; TOPICAL at 02:36

## 2025-07-19 RX ADMIN — Medication 1 APPLICATION: at 02:36

## 2025-07-19 NOTE — PROGRESS NOTES
7/19/2025  PPD #1    Subjective   Natasha feels well.  Patient describes her lochia less than menses.  Pain is well controlled       Objective   Temp: Temp:  [98 °F (36.7 °C)-98.6 °F (37 °C)] 98.3 °F (36.8 °C) Temp src: Oral   BP: BP: ()/(52-83) 119/69        Pulse: Heart Rate:  [69-96] 76  RR: Resp:  [16-18] 18    General:  No acute distress   Abdomen: Fundus firm and beneath umbilicus   Pelvis: deferred     Lab Results   Component Value Date    WBC 10.35 07/19/2025    HGB 12.2 07/19/2025    HCT 36.1 07/19/2025    MCV 88.9 07/19/2025     07/19/2025    ABORH A Rh Positive 10/04/2015    HEPBSAG Negative 12/27/2024     Results from last 7 days   Lab Units 07/18/25 1953   TREPONEMA PALLIDUM AB TOTAL  Non-Reactive     Assessment  PPD# 1 after vaginal delivery, doing well; hemodynamically stable    Plan  Routine postpartum care.    This note has been electronically signed.    Gracie Dickens, APRN  July 19, 2025

## 2025-07-19 NOTE — ANESTHESIA PROCEDURE NOTES
Labor Epidural      Patient reassessed immediately prior to procedure    Patient location during procedure: OB  Performed By  Anesthesiologist: Mildred Oneal DO  Preanesthetic Checklist  Completed: patient identified, IV checked, risks and benefits discussed, surgical consent, monitors and equipment checked, pre-op evaluation and timeout performed  Additional Notes  DPE performed using 25g Tami  Prep:  Pt Position:sitting  Sterile Tech:cap, gloves, mask and sterile barrier  Prep:chlorhexidine gluconate and isopropyl alcohol  Monitoring:blood pressure monitoring  Epidural Block Procedure:  Approach:midline  Guidance:palpation technique  Location:L2-L3  Needle Type:Tuohy  Needle Gauge:17 G  Loss of Resistance Medium: air  Loss of Resistance: 5cm  Cath Depth at skin:11 cm  Paresthesia: none  Aspiration:negative  Test Dose:negative  Number of Attempts: 1  Post Assessment:  Dressing:occlusive dressing applied and secured with tape  Pt Tolerance:patient tolerated the procedure well with no apparent complications  Complications:no

## 2025-07-19 NOTE — ANESTHESIA PREPROCEDURE EVALUATION
Anesthesia Evaluation     Patient summary reviewed and Nursing notes reviewed                Airway   Mallampati: III  TM distance: >3 FB  Neck ROM: full  Dental - normal exam     Pulmonary    (+) pneumonia resolved ,  Cardiovascular - negative cardio ROS        Neuro/Psych- negative ROS  GI/Hepatic/Renal/Endo    (+) GERD well controlled, renal disease-    Musculoskeletal (-) negative ROS    Abdominal    Substance History - negative use     OB/GYN    (+) Pregnant        Other - negative ROS                   Anesthesia Plan    ASA 2     epidural       Anesthetic plan, risks, benefits, and alternatives have been provided, discussed and informed consent has been obtained with: patient.    CODE STATUS:    Code Status (Patient has no pulse and is not breathing): CPR (Attempt to Resuscitate)  Medical Interventions (Patient has pulse or is breathing): Full Support  Level Of Support Discussed With: Patient

## 2025-07-19 NOTE — H&P
[facility]  Obstetric History and Physical    Chief Complaint   Patient presents with    Contractions     Pink discharge       Subjective     Patient is a 40 y.o. female  currently at 38w2d, who presents with contractions.    Her prenatal care is complicated by  advanced maternal age  genetic screening was normal.  Her previous obstetric/gynecological history is noted for  x2.    The following portions of the patients history were reviewed and updated as appropriate: current medications, allergies, past medical history, past surgical history, and problem list .       Prenatal Information:  Prenatal Results       Initial Prenatal Labs       Test Value Reference Range Date Time    Hemoglobin  13.7 g/dL 11.1 - 15.9 24 1532    Hematocrit  41.5 % 34.0 - 46.6 24 1532    Platelets  250 x10E3/uL 150 - 450 24 1532    Rubella IgG  0.98 index Immune >0.99 24 1532    Hepatitis B SAg  Negative  Negative 24 1532    Hepatitis C Ab  Non Reactive  Non Reactive 24 1532    RPR  Non Reactive  Non Reactive 25 1009       Non Reactive  Non Reactive 24 1532    T. Pallidum Ab         ABO  A   24 1532    Rh  Positive   24 1532    Antibody Screen  Negative  Negative 24 1532    HIV  Non Reactive  Non Reactive 24 1532    Urine Culture  Final report   24 1532    Gonorrhea  Negative  Negative 24 1532    Chlamydia  Negative  Negative 24 1532    TSH  0.670 uIU/mL 0.450 - 4.500 24 1532    HgB A1c   4.6 % 4.8 - 5.6 24 1532    Varicella IgG        Hemoglobinopathy Fractionation        Hemoglobinopathy (genetic testing)        Cystic fibrosis         Spinal muscular atrophy        Fragile X                  Fetal testing        Test Value Reference Range Date Time    NIPT        MSAFP  *Screen Negative*   25 0937    AFP-4                  2nd and 3rd Trimester       Test Value Reference Range Date Time    Hemoglobin (repeated)  12.3  g/dL 12.0 - 15.9 05/08/25 1009    Hematocrit (repeated)  36.0 % 34.0 - 46.6 05/08/25 1009    Platelets   208 10*3/mm3 140 - 450 05/08/25 1009       250 x10E3/uL 150 - 450 12/27/24 1532    1 hour GTT   100 mg/dL 65 - 139 05/08/25 1009    Antibody Screen (repeated)  Negative  Negative 05/08/25 1009    3rd TM syphilis scrn (repeated)  RPR   Non Reactive  Non Reactive 05/08/25 1009    3rd TM syphilis scrn (repeated) TP-Ab        3rd TM syphilis screen TB-Ab (FTA)        Syphilis cascade test TP-Ab (EIA)        Syphilis cascade TPPA        GTT Fasting        GTT 1 Hr        GTT 2 Hr        GTT 3 Hr        Group B Strep  No Group B Streptococcus isolated   07/03/25 1853              Other testing        Test Value Reference Range Date Time    Parvo IgG         CMV IgG                   Drug Screening       Test Value Reference Range Date Time    Amphetamine Screen  Negative ng/mL Jinszl=8079 12/27/24 1532    Barbiturate Screen  Negative ng/mL Xrjkkk=748 12/27/24 1532    Benzodiazepine Screen  Negative ng/mL Gxtjte=629 12/27/24 1532    Methadone Screen  Negative ng/mL Wwuerw=524 12/27/24 1532    Phencyclidine Screen  Negative ng/mL Cutoff=25 12/27/24 1532    Opiates Screen  Negative ng/mL Cmqyqu=129 12/27/24 1532    THC Screen  Negative ng/mL Cutoff=20 12/27/24 1532    Cocaine Screen  Negative ng/mL Exmdvp=878 12/27/24 1532    Propoxyphene Screen  Negative ng/mL Tswjzw=190 12/27/24 1532    Buprenorphine Screen        Methamphetamine Screen        Oxycodone Screen        Tricyclic Antidepressants Screen                  Legend    ^: Historical                          External Prenatal Results       Pregnancy Outside Results - Transcribed From Office Records - See Scanned Records For Details       Test Value Date Time    ABO  A  12/27/24 1532    Rh  Positive  12/27/24 1532    Antibody Screen  Negative  05/08/25 1009       Negative  12/27/24 1532    Varicella IgG       Rubella  0.98 index 12/27/24 1532    Hgb  12.3 g/dL  25 1009       13.7 g/dL 24 1532    Hct  36.0 % 25 1009       41.5 % 24 1532    HgB A1c   4.6 % 24 1532    1h GTT  100 mg/dL 25 1009    3h GTT Fasting       3h GTT 1 hour       3h GTT 2 hour       3h GTT 3 hour        Gonorrhea (discrete)  Negative  24 1532    Chlamydia (discrete)  Negative  24 1532    RPR  Non Reactive  25 1009       Non Reactive  24 1532    Syphils cascade: TP-Ab (FTA)       TP-Ab       TP-Ab (EIA)       TPPA       HBsAg  Negative  24 1532    Herpes Simplex Virus PCR       Herpes Simplex VIrus Culture       HIV  Non Reactive  24 1532    Hep C RNA Quant PCR       Hep C Antibody  Non Reactive  24 1532    AFP  26.9 ng/mL 25 0937    NIPT       Cystic Fibrosis (Marcus)       Cystic Fibroisis        Spinal Muscular atrophy       Fragile X       Group B Strep  No Group B Streptococcus isolated  25 1853    GBS Susceptibility to Clindamycin       GBS Susceptibility to Erythromycin       Fetal Fibronectin       Genetic Testing, Maternal Blood                 Drug Screening       Test Value Date Time    Urine Drug Screen       Amphetamine Screen  Negative ng/mL 24 1532    Barbiturate Screen  Negative ng/mL 24 1532    Benzodiazepine Screen  Negative ng/mL 24 1532    Methadone Screen  Negative ng/mL 24 1532    Phencyclidine Screen  Negative ng/mL 24 1532    Opiates Screen       THC Screen       Cocaine Screen       Propoxyphene Screen  Negative ng/mL 24 1532    Buprenorphine Screen       Methamphetamine Screen       Oxycodone Screen       Tricyclic Antidepressants Screen                 Legend    ^: Historical                             Past OB History:     OB History    Para Term  AB Living   5 2 2 0 2 2   SAB IAB Ectopic Molar Multiple Live Births   2 0 0 0 0 2      # Outcome Date GA Lbr Caesar/2nd Weight Sex Type Anes PTL Lv   5 Current            4 SAB 24     SAB       3 Term 01/15/23 38w4d  3190 g (7 lb 0.5 oz) M Vag-Spont EPI N KENNY      Name: GLENDY REYES      Apgar1: 9  Apgar5: 9   2 SAB 08/20/21 13w0d             Birth Comments: had D&C      Complications: Blighted ovum   1 Term 10/04/15 37w6d  2551 g (5 lb 10 oz) M Vag-Spont EPI N KENNY      Birth Comments: 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      Obstetric Comments   FOB #1 : Pregnancy #1; #2; #3        Past Medical History: Past Medical History:   Diagnosis Date    Acid reflux     no longer an issue, per patient (9/2020)    Kidney stone 10/11/2022    Pneumonia 01/06/2019    Pregnancy 1/23/2025    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      Past Surgical History Past Surgical History:   Procedure Laterality Date    D & C WITH SUCTION  8/20/21    WISDOM TOOTH EXTRACTION  10/2002      Family History: Family History   Problem Relation Age of Onset    Hypertension Father     Stroke Father     Other Son         Stroud Syndrome    Hypertension Paternal Grandfather     Arthritis Maternal Grandmother     Clotting disorder Maternal Grandmother     Breast cancer Maternal Grandmother     Hypertension Maternal Grandmother     Deep vein thrombosis Maternal Grandmother     Diabetes Maternal Grandmother     Hyperlipidemia Maternal Grandfather     Hypertension Maternal Grandfather     Colon cancer Neg Hx     Cervical cancer Neg Hx       Social History:  reports that she has never smoked. She has never used smokeless tobacco.   reports that she does not currently use alcohol.   reports no history of drug use.        General ROS: Pertinent items are noted in HPI    Objective       Vital Signs Range for the last 24 hours  Temperature: Temp:  [98 °F (36.7 °C)] 98 °F (36.7 °C)   Temp Source: Temp src: Oral   BP: BP: (133)/(83) 133/83   Pulse: Heart Rate:  [90] 90   Respirations: Resp:  [16] 16   SPO2: SpO2:  [96 %] 96 %   O2 Amount (l/min):     O2 Devices     Weight:       Physical Examination: 5-6/80/-1 per RN    Presentation:          Fetal  Heart Rate Assessment   Method:     Beats/min:     Baseline:     Variability:     Accels:     Decels:     Tracing Category:       Uterine Assessment   Method:     Frequency (min):     Ctx Count in 10 min:     Duration:     Intensity:     Intensity by IUPC:     Resting Tone:     Resting Tone by IUPC:     Pointblank Units:       Cervical Exam:    Exam by: Method: sterile vaginal exam performed (SAMANTHA ARIAS) (07/18/25 1935)  Cervical Dilation (cm): 5-6  Cervical Effacement: 80   Fetal Station: -1       Group B Strep Culture   Date Value Ref Range Status   07/03/2025 No Group B Streptococcus isolated  Final       Assessment & Plan       Normal labor    Antepartum multigravida of advanced maternal age           Assessment:  Intrauterine pregnancy at 38w2d weeks gestation with reactive fetal status.    Labor  Obstetrical history significant for is non-contributory.  GBS status: negative    Plan:  Expectant management and OK for epidural  Plan of care has been reviewed with patient.  Risks, benefits of treatment plan have been discussed.  All questions have been answered.      Christian Mahajan MD  7/18/2025  20:02 EDT

## 2025-07-19 NOTE — ANESTHESIA POSTPROCEDURE EVALUATION
Patient: Natasha Beckett    Procedure Summary       Date: 07/18/25 Room / Location:     Anesthesia Start: 2054 Anesthesia Stop: 2237    Procedure: LABOR ANALGESIA Diagnosis:     Scheduled Providers:  Provider: Mildred Oneal DO    Anesthesia Type: epidural ASA Status: 2            Anesthesia Type: epidural    Vitals  Vitals Value Taken Time   /69 07/19/25 08:20   Temp 98.3 °F (36.8 °C) 07/19/25 08:20   Pulse 76 07/19/25 08:20   Resp 18 07/19/25 08:20   SpO2             Post Anesthesia Care and Evaluation    Patient location during evaluation: bedside  Patient participation: complete - patient participated  Level of consciousness: awake  Pain score: 0  Pain management: satisfactory to patient    Airway patency: patent  Anesthetic complications: No anesthetic complications  PONV Status: none  Cardiovascular status: acceptable and hemodynamically stable  Respiratory status: acceptable  Hydration status: acceptable  Post Neuraxial Block status: Motor and sensory function returned to baseline and No signs or symptoms of PDPH

## 2025-07-19 NOTE — PLAN OF CARE
Goal Outcome Evaluation:  VSS. U/U, firm, light bleeding. Pain controlled with meds. Breastfeeding infant.

## 2025-07-19 NOTE — LACTATION NOTE
25 1452   Maternal Information   Date of Referral 25   Person Making Referral lactation consultant  (courtesy visit, newly postpartum)   Maternal Reason for Referral breastfeeding currently   Infant Reason for Referral  infant   Maternal Assessment   Breast Size Issue none   Breast Shape Bilateral:;round   Breast Density Bilateral:;soft   Areola Bilateral:;elastic   Nipples Bilateral:;short;graspable   Left Nipple Symptoms intact;nontender   Right Nipple Symptoms intact;nontender   Maternal Infant Feeding   Maternal Emotional State independent;receptive;relaxed   Infant Positioning cross-cradle  (left)   Signs of Milk Transfer deep jaw excursions noted;audible swallow   Pain with Feeding yes   Pain Location nipple, right   Pain Description squeezing   Comfort Measures Before/During Feeding latch adjusted   Nipple Shape After Feeding, Right compression stripe   Latch Assistance minimal assistance   Support Person Involvement actively supporting mother   Milk Expression/Equipment   Breast Pump Type double electric, personal   Lactation Referrals   Lactation Referrals outpatient lactation program   Outpatient Lactation Program Lactation Follow-up Date/Time prn after discharge     Courtesy visit to newly postpartum couplet. Mom reports breast feeding going ok. States infant is having difficulty getting latched and has noticed bottom lip turning in and feels like not getting a deep latch. Mom has infant latched in right cross cradle when I walked in room. Infant detached and when Mom tried to re latch, infant had difficulty. Infant has not had a paci at this time. On oral assessment, tight lingual frenulum noted. SLP ordered. Infant able to latch with chin support. Reviewed breast feeding tips and information handouts with parents. They v/u. Encouraged to call lactation with any questions/concerns. Encouraged to call outpatient clinic prn after discharge.

## 2025-07-19 NOTE — L&D DELIVERY NOTE
[facility]   Vaginal Delivery Note    Patient Name: Natasha Beckett  : 1985  MRN: [id number]    Date of Delivery: 2025    Diagnosis     Pre & Post-Delivery:  Intrauterine pregnancy at 38w2d  Labor status: Spontaneous Onset of Labor    Normal labor    Antepartum multigravida of advanced maternal age             Problem List    Transfer to Postpartum     Review the Delivery Report for details.     Delivery     Delivery: Vaginal, Spontaneous    YOB: 2025   Time of Birth:  Gestational Age 10:34 PM  38w2d     Anesthesia: Epidural    Delivering clinician: Christian Mahajan   Forceps?   No   Vacuum? No    Shoulder dystocia present: No        Delivery narrative:  Patient is a 40 y.o.-year-old  who presented at 38 weeks and 2 days for labor.  Her labor was spontaneous. Membranes were ruptured spontaneously for clear. The patient  received an epidural at her request. The patient progressed to fully dilated and pushed over roughly 3  minutes.  Over an intact perineum, at 10:34 PM, the patient delivered a viable male infant from the Left Occiput Anterior presentation.  The anterior shoulder, which was the Right,delivered without difficulty, followed by the remainder of the infant. The infant was placed on the abdomen with nursing supervision. The umbilical cord was clamped and cut after 120 seconds. Private cord blood banking was performed. The placenta delivered shortly thereafter and was found to be intact. The perineum and vagina were closely inspected and it was noted that the patient had a 1st degree laceration(s), which were repaired in the usual running locked fashion using 3-0 Vicryl Rapide.. At the end of the repair, hemostasis was achieved. All needles and instruments were removed from the vagina and the patient was returned to the supine position.        Infant     Findings: male infant     Infant observations: Weight: 3280 g (7 lb 3.7 oz)  Length: 19.25  "in  Observations/Comments:        Apgars:   8 @ 1 minute /      9 @ 5 minutes   Infant Name:      Placenta & Cord         Placenta delivered  Spontaneous at        Cord: 3 vessels present.   Nuchal Cord?  no   Cord blood obtained: No   Cord gases obtained:  No   Cord gas results: Venous:  No results found for: \"PHCVEN\", \"BECVEN\"    Arterial:  No results found for: \"PHCART\", \"BECART\"     Repair     Episiotomy: None    No    Lacerations: Yes  Laceration Information  Laceration Repaired?   Perineal: 1st Yes   Periurethral:       Labial:       Sulcus:       Vaginal:       Cervical:         Suture used for repair: 3-0 Vicryl  Laceration Length for 3rd or 4th degree lacerations:     cm   Estimated Blood Loss: Est. Blood Loss (mL): 150 mL (Filed from Delivery Summary) (07/18/25 1033)     Quantitative Blood Loss:        TOTAL BLOOD LOSS : 150 mL (7/18/2025  7:03 PM - 7/18/2025 10:55 PM)  Complications     none    Disposition     Mother to Mother Baby/Postpartum  in stable condition currently.  Baby to NBN  in stable condition currently.    Christian Mahajan MD  07/18/25  22:55 EDT        "

## 2025-07-20 VITALS
OXYGEN SATURATION: 96 % | SYSTOLIC BLOOD PRESSURE: 133 MMHG | DIASTOLIC BLOOD PRESSURE: 77 MMHG | RESPIRATION RATE: 16 BRPM | TEMPERATURE: 98.5 F | HEART RATE: 88 BPM

## 2025-07-20 RX ORDER — IBUPROFEN 600 MG/1
600 TABLET, FILM COATED ORAL EVERY 6 HOURS PRN
Qty: 30 TABLET | Refills: 0 | Status: SHIPPED | OUTPATIENT
Start: 2025-07-20

## 2025-07-20 RX ORDER — NYSTATIN 100000 U/G
1 CREAM TOPICAL 2 TIMES DAILY
Qty: 30 G | Refills: 0 | Status: SHIPPED | OUTPATIENT
Start: 2025-07-20

## 2025-07-20 RX ADMIN — IBUPROFEN 600 MG: 600 TABLET ORAL at 08:52

## 2025-07-20 NOTE — DISCHARGE SUMMARY
Discharge Summary    Date of Admission: 2025  Date of Discharge:  2025      Patient: Natasha Beckett      MR#:[id number]    Delivery Provider: Christian Mahajan      Presenting Problem/History of Present Illness  Normal labor [O80, Z37.9]       Normal labor    Antepartum multigravida of advanced maternal age         Discharge Diagnosis: Vaginal delivery at 38w2d    Procedures:  Vaginal, Spontaneous    2025   10:34 PM         Hospital Course  Patient is a 40 y.o. female  at 38w2d status post vaginal delivery without complication. Postpartum the patient did well. She remained afebrile, with vital signs stable. She was ready for discharge on postpartum day 2.     Infant:   male fetus 3280 g (7 lb 3.7 oz) with Apgar scores of 8 , 9  at five minutes.    Condition on Discharge:  Stable    Vital Signs  Temp:  [97.6 °F (36.4 °C)-98.5 °F (36.9 °C)] 98.5 °F (36.9 °C)  Heart Rate:  [76-88] 88  Resp:  [16-18] 16  BP: (121-133)/(70-77) 133/77    Lab Results   Component Value Date    WBC 10.35 2025    HGB 12.2 2025    HCT 36.1 2025    MCV 88.9 2025     2025       Discharge Disposition  Home or Self Care    Discharge Medications     Discharge Medications        New Medications        Instructions Start Date   ibuprofen 600 MG tablet  Commonly known as: ADVIL,MOTRIN   600 mg, Oral, Every 6 Hours PRN             Continue These Medications        Instructions Start Date   Acetylcarnitine powder   Take  by mouth.      l-methylfolate calcium 7.5 MG tablet tablet  Commonly known as: DEPLIN   Daily      Magnesium Glycinate 120 MG capsule   1 tablet, Daily      PRENATAL VITAMIN PO   Take  by mouth.      vitamin D3 125 MCG (5000 UT) capsule capsule   5,000 Units, Daily      VITAMIN K PO   Take  by mouth.               Follow-up Appointments  Future Appointments   Date Time Provider Department Center   2025  9:20 AM Ani Rosa MD [facility]   2025  9:20 AM  Ani Rosa MD [facility]   9/25/2025  8:30 AM Ani Rosa MD [facility]     Additional Instructions for the Follow-ups that You Need to Schedule       Discharge Follow-up with Specified Provider: manny; 6 Weeks   As directed      To: manny   Follow Up: 6 Weeks                Gracie Dickens, APRN  07/20/25  10:02 EDT  [facility]

## 2025-07-21 ENCOUNTER — MATERNAL SCREENING (OUTPATIENT)
Dept: CALL CENTER | Facility: HOSPITAL | Age: 40
End: 2025-07-21
Payer: OTHER GOVERNMENT

## 2025-07-21 NOTE — OUTREACH NOTE
Maternal Screening Survey      Flowsheet Row Responses   Eligibility Eligible   Prep survey completed? Yes   Facility patient discharged from? [facility]istered [facility]

## 2025-07-21 NOTE — PAYOR COMM NOTE
"Daysi Beckett (40 y.o. Female)     FROM:Cori Ballard LPN, Utilization Review  Phone #[phone]  Fax #[phone]     ID#[id number]   ADDRESS:  [address] [address]  PHONE #[phone]    FACILITY:  [facility]  NPI#[id number]  TAX ID#[id number]  [address] [address]  PHONE #[phone]    PHYSICIAN:  DR CHRISTIAN MAHAJAN  NPI#[id number]  [address] [facility] [address]  [place]  [address]  PHONE #[phone]    DIAGNOSIS CODE 080  VAGINAL DELIVERY    DISCHARGED 7/20/25          Date of Birth   1985    Social Security Number       Address   [address] [place] [address]    Home Phone   [phone]    MRN   [id number]       Mosque   Orthodox    Marital Status                               Admission Date   7/18/2025    Admission Type   Emergency    Admitting Provider   Christian Mahajan MD    Attending Provider       Department, Room/Bed   [facility] MOTHER BABY 4B, N423/1       Discharge Date   7/20/2025    Discharge Disposition   Home or Self Care    Discharge Destination                                 Attending Provider: (none)   Allergies: No Known Allergies    Isolation: None   Infection: None   Code Status: Prior    Ht: 170.2 cm (67.01\")   Wt: 92.1 kg (203 lb)    Admission Cmt: None   Principal Problem: Normal labor [O80,Z37.9]                   Active Insurance as of 7/18/2025       Primary Coverage       Payor Plan Insurance Group Employer/Plan Group      [employer] [id number]       Payor Plan Address Payor Plan Phone Number Payor Plan Fax Number Effective Dates    [address] [phone]  12/18/2024 - None Entered    ATTN: NEW CLAIMS       [facility] [id number]         Subscriber Name Subscriber Birth Date Member ID       DAYSI BECKETT 1985 [id number]                     Emergency Contacts        (Rel.) Home Phone Work Phone Mobile Phone    " CHON BECKETT (Spouse) [phone] -- [phone]              Lisa: NPI [id number] Tax ID [id number]  Insurance Information                  / [employer] Phone: [phone]    Subscriber: Natasha Beckett Subscriber#: [id number]    Group#: [id number] Precert#: --    Authorization#: -- Effective Date: --             History & Physical        Christian Mahajan MD at 25           Lisa  Obstetric History and Physical    Chief Complaint   Patient presents with    Contractions     Pink discharge       Subjective    Patient is a 40 y.o. female  currently at 38w2d, who presents with contractions.    Her prenatal care is complicated by  advanced maternal age  genetic screening was normal.  Her previous obstetric/gynecological history is noted for  x2.    The following portions of the patients history were reviewed and updated as appropriate: current medications, allergies, past medical history, past surgical history, and problem list .       Prenatal Information:  Prenatal Results       Initial Prenatal Labs       Test Value Reference Range Date Time    Hemoglobin  13.7 g/dL 11.1 - 15.9 24 1532    Hematocrit  41.5 % 34.0 - 46.6 24 1532    Platelets  250 x10E3/uL 150 - 450 24 1532    Rubella IgG  0.98 index Immune >0.99 24 1532    Hepatitis B SAg  Negative  Negative 24 1532    Hepatitis C Ab  Non Reactive  Non Reactive 24 1532    RPR  Non Reactive  Non Reactive 25 1009       Non Reactive  Non Reactive 24 1532    T. Pallidum Ab         ABO  A   24 1532    Rh  Positive   24 1532    Antibody Screen  Negative  Negative 24 1532    HIV  Non Reactive  Non Reactive 24 1532    Urine Culture  Final report   24 1532    Gonorrhea  Negative  Negative 24 1532    Chlamydia  Negative  Negative 24 1532    TSH  0.670 uIU/mL 0.450 - 4.500 24 1532    HgB A1c   4.6 % 4.8 - 5.6 24 1532    Varicella  IgG        Hemoglobinopathy Fractionation        Hemoglobinopathy (genetic testing)        Cystic fibrosis         Spinal muscular atrophy        Fragile X                  Fetal testing        Test Value Reference Range Date Time    NIPT        MSAFP  *Screen Negative*   02/27/25 0937    AFP-4                  2nd and 3rd Trimester       Test Value Reference Range Date Time    Hemoglobin (repeated)  12.3 g/dL 12.0 - 15.9 05/08/25 1009    Hematocrit (repeated)  36.0 % 34.0 - 46.6 05/08/25 1009    Platelets   208 10*3/mm3 140 - 450 05/08/25 1009       250 x10E3/uL 150 - 450 12/27/24 1532    1 hour GTT   100 mg/dL 65 - 139 05/08/25 1009    Antibody Screen (repeated)  Negative  Negative 05/08/25 1009    3rd TM syphilis scrn (repeated)  RPR   Non Reactive  Non Reactive 05/08/25 1009    3rd TM syphilis scrn (repeated) TP-Ab        3rd TM syphilis screen TB-Ab (FTA)        Syphilis cascade test TP-Ab (EIA)        Syphilis cascade TPPA        GTT Fasting        GTT 1 Hr        GTT 2 Hr        GTT 3 Hr        Group B Strep  No Group B Streptococcus isolated   07/03/25 1853              Other testing        Test Value Reference Range Date Time    Parvo IgG         CMV IgG                   Drug Screening       Test Value Reference Range Date Time    Amphetamine Screen  Negative ng/mL Xekykn=6530 12/27/24 1532    Barbiturate Screen  Negative ng/mL Kfvvrd=951 12/27/24 1532    Benzodiazepine Screen  Negative ng/mL Jrlxar=746 12/27/24 1532    Methadone Screen  Negative ng/mL Wajuee=044 12/27/24 1532    Phencyclidine Screen  Negative ng/mL Cutoff=25 12/27/24 1532    Opiates Screen  Negative ng/mL Bttzmj=917 12/27/24 1532    THC Screen  Negative ng/mL Cutoff=20 12/27/24 1532    Cocaine Screen  Negative ng/mL Harbqa=708 12/27/24 1532    Propoxyphene Screen  Negative ng/mL Jnwidv=773 12/27/24 1532    Buprenorphine Screen        Methamphetamine Screen        Oxycodone Screen        Tricyclic Antidepressants Screen                   Legend    ^: Historical                          External Prenatal Results       Pregnancy Outside Results - Transcribed From Office Records - See Scanned Records For Details       Test Value Date Time    ABO  A  12/27/24 1532    Rh  Positive  12/27/24 1532    Antibody Screen  Negative  05/08/25 1009       Negative  12/27/24 1532    Varicella IgG       Rubella  0.98 index 12/27/24 1532    Hgb  12.3 g/dL 05/08/25 1009       13.7 g/dL 12/27/24 1532    Hct  36.0 % 05/08/25 1009       41.5 % 12/27/24 1532    HgB A1c   4.6 % 12/27/24 1532    1h GTT  100 mg/dL 05/08/25 1009    3h GTT Fasting       3h GTT 1 hour       3h GTT 2 hour       3h GTT 3 hour        Gonorrhea (discrete)  Negative  12/27/24 1532    Chlamydia (discrete)  Negative  12/27/24 1532    RPR  Non Reactive  05/08/25 1009       Non Reactive  12/27/24 1532    Syphils cascade: TP-Ab (FTA)       TP-Ab       TP-Ab (EIA)       TPPA       HBsAg  Negative  12/27/24 1532    Herpes Simplex Virus PCR       Herpes Simplex VIrus Culture       HIV  Non Reactive  12/27/24 1532    Hep C RNA Quant PCR       Hep C Antibody  Non Reactive  12/27/24 1532    AFP  26.9 ng/mL 02/27/25 0937    NIPT       Cystic Fibrosis (Marcus)       Cystic Fibroisis        Spinal Muscular atrophy       Fragile X       Group B Strep  No Group B Streptococcus isolated  07/03/25 1853    GBS Susceptibility to Clindamycin       GBS Susceptibility to Erythromycin       Fetal Fibronectin       Genetic Testing, Maternal Blood                 Drug Screening       Test Value Date Time    Urine Drug Screen       Amphetamine Screen  Negative ng/mL 12/27/24 1532    Barbiturate Screen  Negative ng/mL 12/27/24 1532    Benzodiazepine Screen  Negative ng/mL 12/27/24 1532    Methadone Screen  Negative ng/mL 12/27/24 1532    Phencyclidine Screen  Negative ng/mL 12/27/24 1532    Opiates Screen       THC Screen       Cocaine Screen       Propoxyphene Screen  Negative ng/mL 12/27/24 1532    Buprenorphine Screen        Methamphetamine Screen       Oxycodone Screen       Tricyclic Antidepressants Screen                 Legend    ^: Historical                             Past OB History:     OB History    Para Term  AB Living   5 2 2 0 2 2   SAB IAB Ectopic Molar Multiple Live Births   2 0 0 0 0 2      # Outcome Date GA Lbr Caesar/2nd Weight Sex Type Anes PTL Lv   5 Current            4 SAB 24     SAB      3 Term 01/15/23 38w4d  3190 g (7 lb 0.5 oz) M Vag-Spont EPI N KENNY      Name: GLENDY REYES      Apgar1: 9  Apgar5: 9   2 SAB 21 13w0d             Birth Comments: had D&C      Complications: Blighted ovum   1 Term 10/04/15 37w6d  2551 g (5 lb 10 oz) M Vag-Spont EPI N KENNY      Birth Comments: 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      Obstetric Comments   FOB #1 : Pregnancy #1; #2; #3        Past Medical History: Past Medical History:   Diagnosis Date    Acid reflux     no longer an issue, per patient (2020)    Kidney stone 10/11/2022    Pneumonia 2019    Pregnancy 2025    Skeletal malocclusion 2024      Past Surgical History Past Surgical History:   Procedure Laterality Date    D & C WITH SUCTION  21    WISDOM TOOTH EXTRACTION  10/2002      Family History: Family History   Problem Relation Age of Onset    Hypertension Father     Stroke Father     Other Son         Stroud Syndrome    Hypertension Paternal Grandfather     Arthritis Maternal Grandmother     Clotting disorder Maternal Grandmother     Breast cancer Maternal Grandmother     Hypertension Maternal Grandmother     Deep vein thrombosis Maternal Grandmother     Diabetes Maternal Grandmother     Hyperlipidemia Maternal Grandfather     Hypertension Maternal Grandfather     Colon cancer Neg Hx     Cervical cancer Neg Hx       Social History:  reports that she has never smoked. She has never used smokeless tobacco.   reports that she does not currently use alcohol.   reports no history of drug use.        General ROS: Pertinent  items are noted in HPI    Objective      Vital Signs Range for the last 24 hours  Temperature: Temp:  [98 °F (36.7 °C)] 98 °F (36.7 °C)   Temp Source: Temp src: Oral   BP: BP: (133)/(83) 133/83   Pulse: Heart Rate:  [90] 90   Respirations: Resp:  [16] 16   SPO2: SpO2:  [96 %] 96 %   O2 Amount (l/min):     O2 Devices     Weight:       Physical Examination: 5-6/80/-1 per RN    Presentation:          Fetal Heart Rate Assessment   Method:     Beats/min:     Baseline:     Variability:     Accels:     Decels:     Tracing Category:       Uterine Assessment   Method:     Frequency (min):     Ctx Count in 10 min:     Duration:     Intensity:     Intensity by IUPC:     Resting Tone:     Resting Tone by IUPC:     [place] Units:       Cervical Exam:    Exam by: Method: sterile vaginal exam performed (SAMANTHA ARIAS) (07/18/25 1935)  Cervical Dilation (cm): 5-6  Cervical Effacement: 80   Fetal Station: -1       Group B Strep Culture   Date Value Ref Range Status   07/03/2025 No Group B Streptococcus isolated  Final       Assessment & Plan      Normal labor    Antepartum multigravida of advanced maternal age           Assessment:  Intrauterine pregnancy at 38w2d weeks gestation with reactive fetal status.    Labor  Obstetrical history significant for is non-contributory.  GBS status: negative    Plan:  Expectant management and OK for epidural  Plan of care has been reviewed with patient.  Risks, benefits of treatment plan have been discussed.  All questions have been answered.      Christian Mahajna MD  7/18/2025  20:02 EDT      Electronically signed by Christian Mahajan MD at 07/18/25 2002       Facility-Administered Medications as of 7/20/2025   Medication Dose Route Frequency Provider Last Rate Last Admin    [COMPLETED] oxytocin (PITOCIN) 30 units in 0.9% sodium chloride 500 mL (premix)      999 mL/hr at 07/18/25 2245 30 Units at 07/18/25 2245     Orders (all)        Start     Ordered    07/20/25 1002  Discharge patient  Once          07/20/25 1001    07/20/25 0000  Discharge Follow-up with Specified Provider: manny; 6 Weeks         07/20/25 1001    07/20/25 0000  ibuprofen (ADVIL,MOTRIN) 600 MG tablet  Every 6 Hours PRN         07/20/25 1001    07/20/25 0000  nystatin (MYCOSTATIN) 578965 UNIT/GM cream  2 Times Daily         07/20/25 1010    07/19/25 0900  docusate sodium (COLACE) capsule 100 mg  2 Times Daily,   Status:  Discontinued         07/19/25 0137 07/19/25 0900  polyethylene glycol (MIRALAX) packet 17 g  Daily,   Status:  Discontinued         07/19/25 0137 07/19/25 0900  prenatal vitamin tablet 1 tablet  Daily,   Status:  Discontinued         07/19/25 0137 07/19/25 0800  Sitz Bath  3 Times Daily,   Status:  Canceled        Comments: PRN    07/19/25 0137 07/19/25 0800  simethicone (MYLICON) chewable tablet 80 mg  4 Times Daily,   Status:  Discontinued         07/19/25 0137 07/19/25 0600  CBC & Differential  Morning Draw        Comments: Postpartum Day 1      07/19/25 0137 07/19/25 0600  CBC Auto Differential  PROCEDURE ONCE        Comments: Postpartum Day 1      07/19/25 0138 07/19/25 0138  Code Status and Medical Interventions: CPR (Attempt to Resuscitate); Full  Continuous,   Status:  Canceled         07/19/25 0137 07/19/25 0138  Vital Signs Per Hospital Policy  Per Hospital Policy/Protocol,   Status:  Canceled         07/19/25 0137 07/19/25 0138  Notify Provider  Continuous,   Status:  Canceled        Comments: Open Order Report to View Parameters Requiring Provider Notification    07/19/25 0137 07/19/25 0138  Up Ad Vita  Until Discontinued,   Status:  Canceled         07/19/25 0137 07/19/25 0138  Diet: Regular/House; Fluid Consistency: Thin (IDDSI 0)  Diet Effective Now,   Status:  Canceled         07/19/25 0137 07/19/25 0138  Advance Diet As Tolerated -Regular  Until Discontinued,   Status:  Canceled         07/19/25 0137 07/19/25 0138  Fundal and Lochia Check  Per Order Details,   Status:   Canceled        Comments: Every 30 minutes x2, then Every Shift    07/19/25 0137 07/19/25 0138  RN to Assess Rh Status & Place RhIG Evaluation Order if Indicated  Continuous,   Status:  Canceled         07/19/25 0137 07/19/25 0138  Bladder Assessment  Per Order Details,   Status:  Canceled        Comments: Postpartum 1) Upon Admission to Unit & Every 4 Hours PRN Until Voiding. 2) Out of Bed to Void in 8 Hours.    07/19/25 0137 07/19/25 0138  Straight Cath  Per Order Details,   Status:  Canceled        Comments: Postpartum: If Distended & Unable to Void, May Repeat Once.    07/19/25 0137 07/19/25 0138  Indwelling Urinary Catheter  Per Order Details,   Status:  Canceled        Comments: Postpartum : After Straight Cathed x2 or if Greater Than 1000mL Residual, Insert Indwelling Urinary Catheter Until Further MD Order.    07/19/25 0137 07/19/25 0138  Apply Ice to Perineum  Per Order Details,   Status:  Canceled        Comments: For 20 Minutes Every 2 Hours    07/19/25 0137 07/19/25 0138  Waffle Cushion  Per Order Details,   Status:  Canceled        Comments: For Perineal Discomfort    07/19/25 0137 07/19/25 0138  Donut Ring  Per Order Details,   Status:  Canceled        Comments: For Perineal Pain    07/19/25 0137 07/19/25 0138  Kpad  Per Order Details,   Status:  Canceled        Comments: For Pain    07/19/25 0137 07/19/25 0138  Breast pump to bed  Once,   Status:  Canceled         07/19/25 0137 07/19/25 0138  If indicated -- Please administer RH Immunoglobulin based on results of cord blood evaluation and fetal screen lab tests, pharmacy to dispense  Per Order Details,   Status:  Canceled        Comments: See Process Instructions For Reference Range Details.    07/19/25 0137 07/19/25 0137  Ambulate Patient  Every Shift,   Status:  Canceled       07/19/25 0137 07/19/25 0137  Warm compress  As Needed,   Status:  Canceled       07/19/25 0137 07/19/25 0137  Apply ace wrap, tight bra,  "or binder  As Needed,   Status:  Canceled       07/19/25 0137 07/19/25 0137  Apply ice packs  As Needed,   Status:  Canceled       07/19/25 0137 07/19/25 0137  sodium chloride 0.9 % flush 1-10 mL  As Needed,   Status:  Discontinued         07/19/25 0137 07/19/25 0137  oxytocin (PITOCIN) 30 units in 0.9% sodium chloride 500 mL (premix)  Continuous PRN,   Status:  Discontinued         07/19/25 0137 07/19/25 0137  ibuprofen (ADVIL,MOTRIN) tablet 600 mg  Every 6 Hours PRN,   Status:  Discontinued         07/19/25 0137 07/19/25 0137  acetaminophen (TYLENOL) tablet 650 mg  Every 6 Hours PRN,   Status:  Discontinued         07/19/25 0137 07/19/25 0137  oxyCODONE (ROXICODONE) immediate release tablet 5 mg  Every 4 Hours PRN,   Status:  Discontinued        Placed in \"Or\" Linked Group    07/19/25 0137 07/19/25 0137  oxyCODONE (ROXICODONE) immediate release tablet 10 mg  Every 4 Hours PRN,   Status:  Discontinued        Placed in \"Or\" Linked Group    07/19/25 0137 07/19/25 0137  diphenhydrAMINE (BENADRYL) capsule 25 mg  Nightly PRN,   Status:  Discontinued         07/19/25 0137 07/19/25 0137  bisacodyl (DULCOLAX) suppository 10 mg  Daily PRN,   Status:  Discontinued         07/19/25 0137 07/19/25 0137  magnesium hydroxide (MILK OF MAGNESIA) suspension 10 mL  Daily PRN,   Status:  Discontinued         07/19/25 0137 07/19/25 0137  lanolin topical 1 Application  Every 1 Hour PRN,   Status:  Discontinued         07/19/25 0137 07/19/25 0137  benzocaine-menthol (DERMOPLAST) 20-0.5 % topical spray  As Needed,   Status:  Discontinued         07/19/25 0137 07/19/25 0137  witch hazel-glycerin (TUCKS) pad 1 Pad  As Needed,   Status:  Discontinued         07/19/25 0137 07/19/25 0137  Hydrocortisone (Perianal) (ANUSOL-HC) 2.5 % rectal cream 1 Application  As Needed,   Status:  Discontinued         07/19/25 0137    07/19/25 0137  benzocaine (AMERICAINE) 20 % rectal ointment 1 Application  As Needed, " "  Status:  Discontinued         07/19/25 0137 07/19/25 0137  ondansetron ODT (ZOFRAN-ODT) disintegrating tablet 4 mg  Every 6 Hours PRN,   Status:  Discontinued        Placed in \"Or\" Linked Group    07/19/25 0137 07/19/25 0137  ondansetron (ZOFRAN) injection 4 mg  Every 6 Hours PRN,   Status:  Discontinued        Placed in \"Or\" Linked Group    07/19/25 0137    07/19/25 0137  calcium carbonate (TUMS) chewable tablet 500 mg (200 mg elemental)  3 Times Daily PRN,   Status:  Discontinued         07/19/25 0137 07/19/25 0015  oxytocin (PITOCIN) 30 units in 0.9% sodium chloride 500 mL (premix)  Continuous,   Status:  Discontinued        Placed in \"Followed by\" Linked Group    07/18/25 2307 07/18/25 2308  Notify Physician (specified)  Until Discontinued,   Status:  Canceled         07/18/25 2307 07/18/25 2308  Vital Signs Per hospital policy  Per Hospital Policy/Protocol,   Status:  Canceled         07/18/25 2307 07/18/25 2308  Fundal & Lochia Check  Per Order Details,   Status:  Canceled        Comments: Every 15 Minutes x4, Then Every 30 Minutes x2, Then Every Shift    07/18/25 2307 07/18/25 2308  Fundal & Lochia Check  Every Shift,   Status:  Canceled       07/18/25 2307 07/18/25 2308  Diet: Regular/House; Fluid Consistency: Thin (IDDSI 0)  Diet Effective Now,   Status:  Canceled         07/18/25 2307 07/18/25 2308  Nurse may remove epidural catheter after delivery.  Until Discontinued,   Status:  Canceled         07/18/25 2307 07/18/25 2308  Transfer to postpartum when discharge criteria met.  Until Discontinued,   Status:  Canceled         07/18/25 2307 07/18/25 2307  Apply Ice to Perineum  As Needed,   Status:  Canceled       07/18/25 2307 07/18/25 2307  Bladder Assessment  As Needed,   Status:  Canceled       07/18/25 2307 07/18/25 2307  oxytocin (PITOCIN) 30 units in 0.9% sodium chloride 500 mL (premix)  Once,   Status:  Discontinued        Placed in \"Followed by\" Linked Group " "   07/18/25 2307 07/18/25 2307  acetaminophen (TYLENOL) tablet 650 mg  Every 4 Hours PRN,   Status:  Discontinued         07/18/25 2307 07/18/25 2307  ibuprofen (ADVIL,MOTRIN) tablet 600 mg  Every 6 Hours PRN,   Status:  Discontinued         07/18/25 2307 07/18/25 2307  methylergonovine (METHERGINE) injection 200 mcg  Once As Needed,   Status:  Discontinued         07/18/25 2307 07/18/25 2307  carboprost (HEMABATE) injection 250 mcg  Every 15 Minutes PRN,   Status:  Discontinued         07/18/25 2307 07/18/25 2307  miSOPROStol (CYTOTEC) tablet 800 mcg  Once As Needed,   Status:  Discontinued         07/18/25 2307 07/18/25 2307  ondansetron ODT (ZOFRAN-ODT) disintegrating tablet 4 mg  Every 6 Hours PRN,   Status:  Discontinued        Placed in \"Or\" Linked Group    07/18/25 2307 07/18/25 2307  ondansetron (ZOFRAN) injection 4 mg  Every 6 Hours PRN,   Status:  Discontinued        Placed in \"Or\" Linked Group    07/18/25 2307 07/18/25 2252  VTE Prophylaxis Not Indicated: Low Risk; No Risk Factors (0)  Once         07/18/25 2253 07/18/25 2251  Transfer Patient  Once         07/18/25 2253 07/18/25 2210  oxytocin (PITOCIN) 30 units in 0.9% sodium chloride 500 mL (premix)        Note to Pharmacy: Created by cabinet override    07/18/25 2210 07/18/25 2145  ropivacaine (NAROPIN) 0.2 % injection  Continuous,   Status:  Discontinued         07/18/25 2047 07/18/25 2145  Sod Citrate-Citric Acid (BICITRA) oral solution 30 mL  Once,   Status:  Discontinued         07/18/25 2047 07/18/25 2100  sodium chloride 0.9 % flush 10 mL  Every 12 Hours Scheduled,   Status:  Discontinued         07/18/25 1955 07/18/25 2100  famotidine (PEPCID) injection 20 mg  Every 12 Hours Scheduled,   Status:  Discontinued        Placed in \"Or\" Linked Group    07/18/25 1955 07/18/25 2100  famotidine (PEPCID) tablet 20 mg  Every 12 Hours Scheduled,   Status:  Discontinued        Placed in \"Or\" Linked Group    " 07/18/25 1955 07/18/25 2047  Vital Signs Per Anesthesia Guidelines  Per Order Details,   Status:  Canceled        Comments: Every 3 Minutes x20 Minutes Following Epidural Dosing, Then Every 15 Minutes If Stable    07/18/25 2047 07/18/25 2047  Start IV with #16 or #18 gauge angiocath.  Once,   Status:  Canceled         07/18/25 2047 07/18/25 2047  Nurse or anesthesiologist to remain with patient for 15 minutes following dosing.  Until Discontinued,   Status:  Canceled         07/18/25 2047 07/18/25 2047  Facilitate maternal postion on side and maintain uterine displacement.  Until Discontinued,   Status:  Canceled         07/18/25 2047 07/18/25 2047  Consult anesthesia services prior to changing epidural infusion/rate.  Until Discontinued,   Status:  Canceled         07/18/25 2047 07/18/25 2046  lactated ringers bolus 1,000 mL  As Needed,   Status:  Discontinued         07/18/25 2047 07/18/25 2046  ePHEDrine Sulfate (Pressors) 5 MG/ML injection 10 mg  Every 10 Minutes PRN,   Status:  Discontinued         07/18/25 2047 07/18/25 2046  metoclopramide (REGLAN) injection 10 mg  Once As Needed,   Status:  Discontinued         07/18/25 2047 07/18/25 2046  ondansetron (ZOFRAN) injection 4 mg  Once As Needed,   Status:  Discontinued         07/18/25 2047 07/18/25 2046  famotidine (PEPCID) injection 20 mg  Once As Needed,   Status:  Discontinued         07/18/25 2047 07/18/25 2046  diphenhydrAMINE (BENADRYL) injection 12.5 mg  Every 8 Hours PRN,   Status:  Discontinued         07/18/25 2047 07/18/25 2045  lactated ringers infusion  Continuous,   Status:  Discontinued         07/18/25 1955 07/18/25 2045  mineral oil liquid 30 mL  Once,   Status:  Discontinued         07/18/25 1955 07/18/25 2000  Vital Signs q 4 while awake  Every 4 Hours,   Status:  Canceled      Comments: While the patient is awake.    07/18/25 1955 07/18/25 1956  Admit To Obstetrics Inpatient  Once         07/18/25  25  Code Status and Medical Interventions: CPR (Attempt to Resuscitate); Full Support  Continuous,   Status:  Canceled         25  Obtain Informed Consent  Once         25  Vital Signs Per hospital policy  Per Hospital Policy/Protocol,   Status:  Canceled         25  Mini- prep prior to delivery  Once,   Status:  Canceled         25  Continuous Fetal Monitoring With NST on Admission and Prior to Initiation of Oxytocin.  Per Order Details,   Status:  Canceled        Comments: Continuous Fetal Monitoring With NST on Admission & Prior to Initiation of Oxytocin.    25  External Uterine Contraction Monitoring  Per Hospital Policy/Protocol,   Status:  Canceled         25  Notify Physician (specified)  Until Discontinued,   Status:  Canceled         25  Notify physician for hyperstimulus (per hospital algorithm)  Until Discontinued,   Status:  Canceled         25  Notify physician if membranes ruptured, bleeding greater than 1 pad an hour, fetal heart tone abnormality, and severe pain  Until Discontinued,   Status:  Canceled         25  Bed Rest With Bathroom Privileges  Once         25  Intake and Output  Every Shift,   Status:  Canceled       25  Cervical Exam  Per Hospital Policy/Protocol,   Status:  Canceled         25  Initiate Group Beta Strep (GBS) Prophylaxis Protocol, If Criteria Met  Continuous,   Status:  Canceled        Comments: NO TREATMENT RECOMMENDED IF: 1)  Maternal GBS status known negative 2)  Scheduled  birth with intact membranes, not in labor.  3 ) Maternal GBS unknown, no risk factors.   TREAT WITH ANTIBIOTICS IF:  1)  Maternal GBS status is  "known postive.  2)  Maternal GBS status unknown with these risk factors:  a)  Previous infant affected by GBS infection.  b)  GBS urinary tract infection (UTI) or bacteruria during pregnancy  c)  Unexplained maternal fever in labor (greater than or equal to 100.4F or 38.0C)  d)  Prolonged rupture of the membranes greater than or equal to 18 hours.  e)  Gestational age less than 37 weeks.    07/18/25 1955 07/18/25 1956  NPO Diet NPO Type: Ice Chips  Diet Effective Now,   Status:  Canceled         07/18/25 1955 07/18/25 1956  Insert Peripheral IV  Once         07/18/25 1955 07/18/25 1956  Saline Lock & Maintain IV Access  Continuous,   Status:  Canceled         07/18/25 1955 07/18/25 1956  VTE Prophylaxis Not Indicated: Low Risk; No Risk Factors (0)  Once         07/18/25 1955 07/18/25 1955  Position change  As Needed,   Status:  Canceled      Comments: For intra-uterine resuscitation for hypertonus, hypertstimulation, or non-reassuring fetal status    07/18/25 1955 07/18/25 1955  sodium chloride 0.9 % flush 10 mL  As Needed,   Status:  Discontinued         07/18/25 1955 07/18/25 1955  sodium chloride 0.9 % infusion 40 mL  As Needed,   Status:  Discontinued         07/18/25 1955 07/18/25 1955  lidocaine PF 1% (XYLOCAINE) injection 0.5 mL  Once As Needed,   Status:  Discontinued         07/18/25 1955 07/18/25 1955  lactated ringers bolus 1,000 mL  Once As Needed,   Status:  Discontinued         07/18/25 1955 07/18/25 1955  acetaminophen (TYLENOL) tablet 975 mg  Every 6 Hours PRN,   Status:  Discontinued         07/18/25 1955 07/18/25 1955  ondansetron ODT (ZOFRAN-ODT) disintegrating tablet 4 mg  Every 6 Hours PRN,   Status:  Discontinued        Placed in \"Or\" Linked Group    07/18/25 1955 07/18/25 1955  ondansetron (ZOFRAN) injection 4 mg  Every 6 Hours PRN,   Status:  Discontinued        Placed in \"Or\" Linked Group    07/18/25 1955 07/18/25 1955  terbutaline (BRETHINE) injection " 0.25 mg  As Needed,   Status:  Discontinued         25  Sod Citrate-Citric Acid (BICITRA) oral solution 30 mL  Once As Needed,   Status:  Discontinued         25  Treponema pallidum AB w/Reflex RPR  Once         25  Preeclampsia Panel  Once         25  Type & Screen  STAT         25  CBC (No Diff)  STAT         25                     Operative/Procedure Notes (all)        Christian Mahajan MD at 25 2259  Version 1 of 1          [facility]   Vaginal Delivery Note    Patient Name: Natasha Beckett  : 1985  MRN: [id number]    Date of Delivery: 2025    Diagnosis     Pre & Post-Delivery:  Intrauterine pregnancy at 38w2d  Labor status: Spontaneous Onset of Labor    Normal labor    Antepartum multigravida of advanced maternal age             Problem List    Transfer to Postpartum     Review the Delivery Report for details.     Delivery     Delivery: Vaginal, Spontaneous    YOB: 2025   Time of Birth:  Gestational Age 10:34 PM  38w2d     Anesthesia: Epidural    Delivering clinician: Christian Mahajan   Forceps?   No   Vacuum? No    Shoulder dystocia present: No        Delivery narrative:  Patient is a 40 y.o.-year-old  who presented at 38 weeks and 2 days for labor.  Her labor was spontaneous. Membranes were ruptured spontaneously for clear. The patient  received an epidural at her request. The patient progressed to fully dilated and pushed over roughly 3  minutes.  Over an intact perineum, at 10:34 PM, the patient delivered a viable male infant from the Left Occiput Anterior presentation.  The anterior shoulder, which was the Right,delivered without difficulty, followed by the remainder of the infant. The infant was placed on the abdomen with nursing supervision. The umbilical cord was clamped and cut after 120 seconds.  "Private cord blood banking was performed. The placenta delivered shortly thereafter and was found to be intact. The perineum and vagina were closely inspected and it was noted that the patient had a 1st degree laceration(s), which were repaired in the usual running locked fashion using 3-0 Vicryl Rapide.. At the end of the repair, hemostasis was achieved. All needles and instruments were removed from the vagina and the patient was returned to the supine position.        Infant     Findings: male infant     Infant observations: Weight: 3280 g (7 lb 3.7 oz)  Length: 19.25 in  Observations/Comments:        Apgars:   8 @ 1 minute /      9 @ 5 minutes   Infant Name:      Placenta & Cord         Placenta delivered  Spontaneous at        Cord: 3 vessels present.   Nuchal Cord?  no   Cord blood obtained: No   Cord gases obtained:  No   Cord gas results: Venous:  No results found for: \"PHCVEN\", \"BECVEN\"    Arterial:  No results found for: \"PHCART\", \"BECART\"     Repair     Episiotomy: None    No    Lacerations: Yes  Laceration Information  Laceration Repaired?   Perineal: 1st Yes   Periurethral:       Labial:       Sulcus:       Vaginal:       Cervical:         Suture used for repair: 3-0 Vicryl  Laceration Length for 3rd or 4th degree lacerations:     cm   Estimated Blood Loss: Est. Blood Loss (mL): 150 mL (Filed from Delivery Summary) (07/18/25 2234)     Quantitative Blood Loss:        TOTAL BLOOD LOSS : 150 mL (7/18/2025  7:03 PM - 7/18/2025 10:55 PM)  Complications     none    Disposition     Mother to Mother Baby/Postpartum  in stable condition currently.  Baby to NBN  in stable condition currently.    Christian Mahajan MD  07/18/25  22:55 EDT          Electronically signed by Christian Mahajan MD at 07/18/25 8550          Physician Progress Notes (all)        Gracie Dickens, APRN at 07/19/25 0936          7/19/2025  PPD #1    Subjective   Natasha feels well.  Patient describes her lochia less than menses.  Pain is well " controlled       Objective   Temp: Temp:  [98 °F (36.7 °C)-98.6 °F (37 °C)] 98.3 °F (36.8 °C) Temp src: Oral   BP: BP: ()/(52-83) 119/69        Pulse: Heart Rate:  [69-96] 76  RR: Resp:  [16-18] 18    General:  No acute distress   Abdomen: Fundus firm and beneath umbilicus   Pelvis: deferred     Lab Results   Component Value Date    WBC 10.35 2025    HGB 12.2 2025    HCT 36.1 2025    MCV 88.9 2025     2025    ABORH A Rh Positive 10/04/2015    HEPBSAG Negative 2024     Results from last 7 days   Lab Units 25   TREPONEMA PALLIDUM AB TOTAL  Non-Reactive     Assessment  PPD# 1 after vaginal delivery, doing well; hemodynamically stable    Plan  Routine postpartum care.    This note has been electronically signed.    AUDRA Bradley  2025    Electronically signed by Gracie Dickens APRN at 25 0948          Discharge Summary        Gracie Dickens APRN at 25 1002          Discharge Summary    Date of Admission: 2025  Date of Discharge:  2025      Patient: Natasha Beckett      MR#:[id number]    Delivery Provider: Christian Mahajan      Presenting Problem/History of Present Illness  Normal labor [O80, Z37.9]       Normal labor    Antepartum multigravida of advanced maternal age         Discharge Diagnosis: Vaginal delivery at 38w2d    Procedures:  Vaginal, Spontaneous    2025   10:34 PM         Hospital Course  Patient is a 40 y.o. female  at 38w2d status post vaginal delivery without complication. Postpartum the patient did well. She remained afebrile, with vital signs stable. She was ready for discharge on postpartum day 2.     Infant:   male fetus 3280 g (7 lb 3.7 oz) with Apgar scores of 8 , 9  at five minutes.    Condition on Discharge:  Stable    Vital Signs  Temp:  [97.6 °F (36.4 °C)-98.5 °F (36.9 °C)] 98.5 °F (36.9 °C)  Heart Rate:  [76-88] 88  Resp:  [16-18] 16  BP: (121-133)/(70-77) 133/77    Lab Results    Component Value Date    WBC 10.35 07/19/2025    HGB 12.2 07/19/2025    HCT 36.1 07/19/2025    MCV 88.9 07/19/2025     07/19/2025       Discharge Disposition  Home or Self Care    Discharge Medications     Discharge Medications        New Medications        Instructions Start Date   ibuprofen 600 MG tablet  Commonly known as: ADVIL,MOTRIN   600 mg, Oral, Every 6 Hours PRN             Continue These Medications        Instructions Start Date   Acetylcarnitine powder   Take  by mouth.      l-methylfolate calcium 7.5 MG tablet tablet  Commonly known as: DEPLIN   Daily      Magnesium Glycinate 120 MG capsule   1 tablet, Daily      PRENATAL VITAMIN PO   Take  by mouth.      vitamin D3 125 MCG (5000 UT) capsule capsule   5,000 Units, Daily      VITAMIN K PO   Take  by mouth.               Follow-up Appointments  Future Appointments   Date Time Provider Department Center   7/24/2025  9:20 AM Ani Rosa MD [facility]   7/31/2025  9:20 AM Ani Rosa MD [facility]   9/25/2025  8:30 AM Ani Rosa MD [facility]     Additional Instructions for the Follow-ups that You Need to Schedule       Discharge Follow-up with Specified Provider: manny; 6 Weeks   As directed      To: manny   Follow Up: 6 Weeks                AUDRA Bradley  07/20/25  10:02 EDT  Csd          Electronically signed by Gracie Dickens APRN at 07/20/25 1002

## 2025-07-29 ENCOUNTER — MATERNAL SCREENING (OUTPATIENT)
Dept: CALL CENTER | Facility: HOSPITAL | Age: 40
End: 2025-07-29
Payer: OTHER GOVERNMENT

## 2025-07-29 NOTE — OUTREACH NOTE
Maternal Screening Survey      Flowsheet Row Responses   Facility patient discharged from? [facility]   Attempt successful? Yes   Call start time 1026   Call end time 1028   Person spoke with today (if not patient) and relationship Patient   I have been able to laugh and see the funny side of things. 0   I have looked forward with enjoyment to things. 0   I have blamed myself unnecessarily when things went wrong. 0   I have been anxious or worried for no good reason. 0   I have felt scared or panicky for no good reason. 0   Things have been getting on top of me. 0   I have been so unhappy that I have had difficulty sleeping. 0   I have felt sad or miserable. 0   I have been so unhappy that I have been crying. 0   The thought of harming myself has occurred to me. 0   [place]  Depression Scale Total 0   Did any of your parents have problems with alcohol or drug use? No   Do any of your peers have problems with alcohol or drug use? No   Does your partner have problems with alcohol or drug use? No   Before you were pregnant did you have problems with alcohol or drug use? (past) No   In the past month, did you drink beer, wine, liquor or use any other drugs? (pregnancy) No   Maternal Screening call completed Yes              German PRATT - Registered Nurse

## 2025-08-28 ENCOUNTER — POSTPARTUM VISIT (OUTPATIENT)
Dept: OBSTETRICS AND GYNECOLOGY | Facility: CLINIC | Age: 40
End: 2025-08-28
Payer: OTHER GOVERNMENT

## 2025-08-28 VITALS
DIASTOLIC BLOOD PRESSURE: 80 MMHG | SYSTOLIC BLOOD PRESSURE: 114 MMHG | BODY MASS INDEX: 28.41 KG/M2 | WEIGHT: 181 LBS | HEIGHT: 67 IN

## 2025-08-28 PROBLEM — O09.529 ANTEPARTUM MULTIGRAVIDA OF ADVANCED MATERNAL AGE: Status: RESOLVED | Noted: 2024-12-27 | Resolved: 2025-08-28

## 2025-08-28 PROBLEM — Z37.9 NORMAL LABOR: Status: RESOLVED | Noted: 2025-07-18 | Resolved: 2025-08-28

## 2025-08-28 PROBLEM — Z28.39 RUBELLA NON-IMMUNE STATUS, ANTEPARTUM: Status: RESOLVED | Noted: 2025-03-14 | Resolved: 2025-08-28

## 2025-08-28 PROBLEM — Z34.90 PREGNANCY: Status: RESOLVED | Noted: 2025-01-23 | Resolved: 2025-08-28

## 2025-08-28 PROBLEM — O09.899 RUBELLA NON-IMMUNE STATUS, ANTEPARTUM: Status: RESOLVED | Noted: 2025-03-14 | Resolved: 2025-08-28
